# Patient Record
Sex: FEMALE | Race: WHITE | NOT HISPANIC OR LATINO | Employment: FULL TIME | URBAN - METROPOLITAN AREA
[De-identification: names, ages, dates, MRNs, and addresses within clinical notes are randomized per-mention and may not be internally consistent; named-entity substitution may affect disease eponyms.]

---

## 2017-02-03 ENCOUNTER — TELEPHONE (OUTPATIENT)
Dept: FAMILY MEDICINE | Facility: OTHER | Age: 57
End: 2017-02-03

## 2017-02-03 DIAGNOSIS — R53.83 FATIGUE, UNSPECIFIED TYPE: Primary | ICD-10-CM

## 2017-02-03 DIAGNOSIS — R53.83 FATIGUE, UNSPECIFIED TYPE: ICD-10-CM

## 2017-02-03 LAB
BASOPHILS # BLD AUTO: 0 10E9/L (ref 0–0.2)
BASOPHILS NFR BLD AUTO: 0.6 %
DIFFERENTIAL METHOD BLD: NORMAL
EOSINOPHIL # BLD AUTO: 0.3 10E9/L (ref 0–0.7)
EOSINOPHIL NFR BLD AUTO: 4.1 %
ERYTHROCYTE [DISTWIDTH] IN BLOOD BY AUTOMATED COUNT: 14 % (ref 10–15)
HCT VFR BLD AUTO: 39.1 % (ref 35–47)
HGB BLD-MCNC: 12.7 G/DL (ref 11.7–15.7)
IRON SATN MFR SERPL: 6 % (ref 15–46)
IRON SERPL-MCNC: 28 UG/DL (ref 35–180)
LYMPHOCYTES # BLD AUTO: 2.3 10E9/L (ref 0.8–5.3)
LYMPHOCYTES NFR BLD AUTO: 32.9 %
MCH RBC QN AUTO: 27.5 PG (ref 26.5–33)
MCHC RBC AUTO-ENTMCNC: 32.5 G/DL (ref 31.5–36.5)
MCV RBC AUTO: 85 FL (ref 78–100)
MONOCYTES # BLD AUTO: 0.6 10E9/L (ref 0–1.3)
MONOCYTES NFR BLD AUTO: 8.2 %
NEUTROPHILS # BLD AUTO: 3.9 10E9/L (ref 1.6–8.3)
NEUTROPHILS NFR BLD AUTO: 54.2 %
PLATELET # BLD AUTO: 324 10E9/L (ref 150–450)
RBC # BLD AUTO: 4.62 10E12/L (ref 3.8–5.2)
TIBC SERPL-MCNC: 467 UG/DL (ref 240–430)
TSH SERPL DL<=0.005 MIU/L-ACNC: 2.31 MU/L (ref 0.4–4)
WBC # BLD AUTO: 7.1 10E9/L (ref 4–11)

## 2017-02-03 PROCEDURE — 85025 COMPLETE CBC W/AUTO DIFF WBC: CPT | Performed by: FAMILY MEDICINE

## 2017-02-03 PROCEDURE — 36415 COLL VENOUS BLD VENIPUNCTURE: CPT | Performed by: FAMILY MEDICINE

## 2017-02-03 PROCEDURE — 84443 ASSAY THYROID STIM HORMONE: CPT | Performed by: FAMILY MEDICINE

## 2017-02-03 PROCEDURE — 82306 VITAMIN D 25 HYDROXY: CPT | Performed by: FAMILY MEDICINE

## 2017-02-03 PROCEDURE — 83550 IRON BINDING TEST: CPT | Performed by: FAMILY MEDICINE

## 2017-02-03 PROCEDURE — 82607 VITAMIN B-12: CPT | Performed by: FAMILY MEDICINE

## 2017-02-03 PROCEDURE — 83540 ASSAY OF IRON: CPT | Performed by: FAMILY MEDICINE

## 2017-02-03 NOTE — TELEPHONE ENCOUNTER
Pt is calling back and wants to know what she should do. She would like to get labs done no matter what. She is constantly tired and she feels her levels are low. Please advise.

## 2017-02-03 NOTE — TELEPHONE ENCOUNTER
[facility] phone call message- patient request for an order or referral:    Order or referral being requested: orders  Reason for request: lab  Date needed: as soon as possible  Has the patient been seen by the PCP for this problem? NO    Additional comments: Patient states she is tired all the time and would like some lab work done.    OK to leave the result message on voice mail or with a family member? YES    Call taken on 2/3/2017 at 7:19 AM by Carolynn Chao

## 2017-02-03 NOTE — TELEPHONE ENCOUNTER
I spoke with patient and informed her of the message below.  She is now scheduled for today 2/3/17 to get her labs done.  No further questions at this time.  Tegan Hernández, CMA

## 2017-02-04 LAB — VIT B12 SERPL-MCNC: 639 PG/ML (ref 193–986)

## 2017-02-05 LAB — DEPRECATED CALCIDIOL+CALCIFEROL SERPL-MC: 42 UG/L (ref 20–75)

## 2017-02-07 NOTE — PROGRESS NOTES
Quick Note:    Your recent blood tests show that your all your tests are normal except for your iron level which is slightly low . You take over the counter iron supplements  ______

## 2017-02-13 NOTE — PROGRESS NOTES
SUBJECTIVE:                                                    Sun De La Garza is a 56 year old female who presents to clinic today for the following health issues:    Acute Illness   Acute illness concerns: sore throat, cough , seen at the urgent care strept test negative , diagnosed with Viral uri , now voice is hoarse, also worried about Mono  Onset: x 1 weeks    Fever: no    Chills/Sweats: YES- both    Headache (location?): no    Sinus Pressure:no    Conjunctivitis:  no    Ear Pain: no    Rhinorrhea: no    Congestion: no    Sore Throat: no     Cough: YES - phlegm, hoarse voice    Wheeze: no    Decreased Appetite: no    Nausea: yes    Vomiting: no     Diarrhea:  no    Dysuria/Freq.: no    Fatigue/Achiness: YES- tired    Sick/Strep Exposure: YES- co workers     Therapies Tried and outcome: nothing      Patient states she also needs to do a biometric screening. Patient has form with her.    Patient with depression and anxiety, on sertraline and tolerated medication well, will like to get refill today     GERD: stable, Omeprazole, helping , requesting refill  IBS doing well with Bentyl and probiotics      Problem list and histories reviewed & adjusted, as indicated.  Additional history: as documented    Patient Active Problem List   Diagnosis     CARDIOVASCULAR SCREENING; LDL GOAL LESS THAN 160     Chronic abdominal pain     Skin tag     Sebaceous cyst     Vitamin D deficiency     Depression with anxiety     IBS (irritable bowel syndrome)     Diverticulosis of large intestine     Past Surgical History   Procedure Laterality Date     Gyn surgery       ablation     Colonoscopy N/A 9/22/2014     Procedure: COLONOSCOPY;  Surgeon: Mj Morales MD;  Location:  GI     Esophagoscopy, gastroscopy, duodenoscopy (egd), combined N/A 9/22/2014     Procedure: COMBINED ESOPHAGOSCOPY, GASTROSCOPY, DUODENOSCOPY (EGD), BIOPSY SINGLE OR MULTIPLE;  Surgeon: Mj Morales MD;  Location:  GI       Social History  "  Substance Use Topics     Smoking status: Former Smoker     Quit date: 3/4/2012     Smokeless tobacco: Never Used     Alcohol use No      Comment: socailly     Family History   Problem Relation Age of Onset     Other Cancer Father      Melanoma Father      Hypertension Father      Hyperlipidemia Mother      Hypertension Brother      Hypertension Brother      Anesthesia Reaction No family hx of          Current Outpatient Prescriptions   Medication Sig Dispense Refill     sertraline (ZOLOFT) 50 MG tablet Take 2 tablets (100 mg) by mouth daily 180 tablet 1     omeprazole (PRILOSEC) 40 MG capsule Take 1 capsule (40 mg) by mouth daily Take 30-60 minutes before a meal. 90 capsule 2     dicyclomine (BENTYL) 10 MG capsule Take 1 capsule (10 mg) by mouth 2 times daily (before meals) 240 capsule 1     saccharomyces boulardii (FLORASTOR) 250 MG capsule Take 250 mg by mouth 2 times daily       aspirin (BABY ASPIRIN) 81 MG chewable tablet Take 81 mg by mouth daily.       dicyclomine (BENTYL) 10 MG capsule TAKE 1 CAPSULE TWICE A DAY BEFORE MEALS (Patient not taking: Reported on 2/15/2017) 240 capsule 0     No Known Allergies  BP Readings from Last 3 Encounters:   02/15/17 118/66   10/26/16 124/80   07/05/16 110/65    Wt Readings from Last 3 Encounters:   02/15/17 174 lb 1.6 oz (79 kg)   10/26/16 172 lb 11.2 oz (78.3 kg)   05/17/16 162 lb (73.5 kg)                  Labs reviewed in EPIC  Problem list, Medication list, Allergies, and Medical/Social/Surgical histories reviewed in [facility] and updated as appropriate.    ROS:  C: NEGATIVE for fever, chills, change in weight  ENT/MOUTH: POSITIVE for Hoarse voice   R: NEGATIVE for significant cough or SOB  CV: NEGATIVE for chest pain, palpitations or peripheral edema    OBJECTIVE:                                                    /66 (BP Location: Right arm, Patient Position: Chair, Cuff Size: Adult Regular)  Pulse 72  Temp 96.6  F (35.9  C) (Temporal)  Resp 16  Ht 5' 3.6\" (1.615 " m)  Wt 174 lb 1.6 oz (79 kg)  SpO2 95%  Breastfeeding? No  BMI 30.26 kg/m2  Body mass index is 30.26 kg/(m^2).   GENERAL: healthy, alert, well nourished, well hydrated, no distress  HENT: ear canals- normal; TMs- normal; Nose- normal; Mouth- no ulcers, no lesions  NECK: no tenderness, no adenopathy, no asymmetry, no masses, no stiffness; thyroid- normal to palpation  RESP: lungs clear to auscultation - no rales, no rhonchi, no wheezes  CV: regular rates and rhythm, normal S1 S2, no S3 or S4 and no murmur, no click or rub -  ABDOMEN: soft, no tenderness, no  hepatosplenomegaly, no masses, normal bowel sounds  PSYCH: Alert and oriented times 3; speech- coherent , normal rate and volume; able to articulate logical thoughts, able to abstract reason, no tangential thoughts, no hallucinations or delusions, affect- normal  MENTAL STATUS EXAM:  Appearance/Behavior: No apparent distress and Casually groomed  Speech: Normal  Mood/Affect: normal affect  Insight: Adequate    Diagnostic test results:  Diagnostic Test Results:  Results for orders placed or performed in visit on 02/15/17 (from the past 24 hour(s))   Mononucleosis screen   Result Value Ref Range    Mononucleosis Screen Negative NEG        ASSESSMENT/PLAN:                                                    1. Encounter for biometric screening    - Lipid panel reflex to direct LDL  - Glucose    2. Depression with anxiety  Stable   - DEPRESSION ACTION PLAN (DAP) Order [[id number]]  - sertraline (ZOLOFT) 50 MG tablet; Take 2 tablets (100 mg) by mouth daily  Dispense: 180 tablet; Refill: 1    3. Gastroesophageal reflux disease with esophagitis  Stable   - omeprazole (PRILOSEC) 40 MG capsule; Take 1 capsule (40 mg) by mouth daily Take 30-60 minutes before a meal.  Dispense: 90 capsule; Refill: 2    4. Irritable bowel syndrome with both constipation and diarrhea  Stable   - dicyclomine (BENTYL) 10 MG capsule; Take 1 capsule (10 mg) by mouth 2 times daily (before meals)   Dispense: 240 capsule; Refill: 1    5. Chronic fatigue  Negative   - Mononucleosis screen    6. Laryngitis    Rest your voice until it recovers. Talk as little as possible. If your symptoms are severe, rest at home for a day or so.    Breathing cool steam from a humidifier/vaporizer or in a steamy shower may be helpful.    Drink plenty of fluids to stay well hydrated.    Do not smoke        Follow up with Provider - sherine Nash MD, MD  [facility]

## 2017-02-15 ENCOUNTER — OFFICE VISIT (OUTPATIENT)
Dept: FAMILY MEDICINE | Facility: OTHER | Age: 57
End: 2017-02-15
Payer: COMMERCIAL

## 2017-02-15 VITALS
WEIGHT: 174.1 LBS | SYSTOLIC BLOOD PRESSURE: 118 MMHG | RESPIRATION RATE: 16 BRPM | BODY MASS INDEX: 29.72 KG/M2 | OXYGEN SATURATION: 95 % | HEART RATE: 72 BPM | TEMPERATURE: 96.6 F | HEIGHT: 64 IN | DIASTOLIC BLOOD PRESSURE: 66 MMHG

## 2017-02-15 DIAGNOSIS — R53.82 CHRONIC FATIGUE: ICD-10-CM

## 2017-02-15 DIAGNOSIS — F41.8 DEPRESSION WITH ANXIETY: ICD-10-CM

## 2017-02-15 DIAGNOSIS — K58.2 IRRITABLE BOWEL SYNDROME WITH BOTH CONSTIPATION AND DIARRHEA: ICD-10-CM

## 2017-02-15 DIAGNOSIS — Z00.8 ENCOUNTER FOR BIOMETRIC SCREENING: Primary | ICD-10-CM

## 2017-02-15 DIAGNOSIS — J04.0 LARYNGITIS: ICD-10-CM

## 2017-02-15 DIAGNOSIS — K21.00 GASTROESOPHAGEAL REFLUX DISEASE WITH ESOPHAGITIS: ICD-10-CM

## 2017-02-15 LAB — HETEROPH AB SER QL: NEGATIVE

## 2017-02-15 PROCEDURE — 36415 COLL VENOUS BLD VENIPUNCTURE: CPT | Performed by: FAMILY MEDICINE

## 2017-02-15 PROCEDURE — 86308 HETEROPHILE ANTIBODY SCREEN: CPT | Performed by: FAMILY MEDICINE

## 2017-02-15 PROCEDURE — 82947 ASSAY GLUCOSE BLOOD QUANT: CPT | Performed by: FAMILY MEDICINE

## 2017-02-15 PROCEDURE — 80061 LIPID PANEL: CPT | Performed by: FAMILY MEDICINE

## 2017-02-15 PROCEDURE — 99214 OFFICE O/P EST MOD 30 MIN: CPT | Performed by: FAMILY MEDICINE

## 2017-02-15 RX ORDER — DICYCLOMINE HYDROCHLORIDE 10 MG/1
10 CAPSULE ORAL
Qty: 240 CAPSULE | Refills: 1 | Status: SHIPPED | OUTPATIENT
Start: 2017-02-15 | End: 2017-05-02

## 2017-02-15 RX ORDER — OMEPRAZOLE 40 MG/1
40 CAPSULE, DELAYED RELEASE ORAL DAILY
Qty: 90 CAPSULE | Refills: 2 | Status: SHIPPED | OUTPATIENT
Start: 2017-02-15 | End: 2017-11-14

## 2017-02-15 ASSESSMENT — PAIN SCALES - GENERAL: PAINLEVEL: NO PAIN (0)

## 2017-02-15 NOTE — PATIENT INSTRUCTIONS
Laryngitis    Laryngitis is a swelling of the tissues around the vocal cords. Symptoms include a hoarse (scratchy) voice. The voice may be lost completely. It may be caused by a viral illness, such as a head or chest cold. It may also be due to overuse and strain of the voice. Smoking, drinking alcohol, acid reflux, allergies, or inhaling harsh chemicals may also lead to symptoms. This condition will usually resolve in 1-2 weeks.  Home care    Rest your voice until it recovers. Talk as little as possible. If your symptoms are severe, rest at home for a day or so.    Breathing cool steam from a humidifier/vaporizer or in a steamy shower may be helpful.    Drink plenty of fluids to stay well hydrated.    Do not smoke  Follow-up care  Follow up with your healthcare provider or this facility if you have not improved after one week.  When to seek medical advice  Contact your healthcare provider for any of the following:    Severe pain with swallowing    Trouble opening mouth    Neck swelling, neck pain, or trouble moving neck    Noisy breathing or trouble breathing    Fever of 100.4 F (38. C) or higher, or as directed by your healthcare provider    Drooling    Symptoms do not resolve in 2 weeks    1028-1451 [employer] [address] [place] [address], [place] [address]. All rights reserved. This information is not intended as a substitute for professional medical care. Always follow your healthcare professional's instructions.

## 2017-02-15 NOTE — MR AVS SNAPSHOT
After Visit Summary   2/15/2017    Sun De La Garza    MRN: [id number]           Patient Information     Date Of Birth          1960        Visit Information        Provider Department      2/15/2017 3:00 PM Zuleika Nash MD [facility]        Today's Diagnoses     Encounter for biometric screening    -  1    Depression with anxiety        Gastroesophageal reflux disease with esophagitis        Irritable bowel syndrome with both constipation and diarrhea        Chronic fatigue        Laryngitis          Care Instructions      Laryngitis    Laryngitis is a swelling of the tissues around the vocal cords. Symptoms include a hoarse (scratchy) voice. The voice may be lost completely. It may be caused by a viral illness, such as a head or chest cold. It may also be due to overuse and strain of the voice. Smoking, drinking alcohol, acid reflux, allergies, or inhaling harsh chemicals may also lead to symptoms. This condition will usually resolve in 1-2 weeks.  Home care    Rest your voice until it recovers. Talk as little as possible. If your symptoms are severe, rest at home for a day or so.    Breathing cool steam from a humidifier/vaporizer or in a steamy shower may be helpful.    Drink plenty of fluids to stay well hydrated.    Do not smoke  Follow-up care  Follow up with your healthcare provider or this facility if you have not improved after one week.  When to seek medical advice  Contact your healthcare provider for any of the following:    Severe pain with swallowing    Trouble opening mouth    Neck swelling, neck pain, or trouble moving neck    Noisy breathing or trouble breathing    Fever of 100.4 F (38. C) or higher, or as directed by your healthcare provider    Drooling    Symptoms do not resolve in 2 weeks    9921-4006 [employer]. [address] [place] [address], [place] [address]. All rights reserved. This information is not intended as a substitute for  "professional medical care. Always follow your healthcare professional's instructions.              Follow-ups after your visit        Who to contact     If you have questions or need follow up information about today's clinic visit or your schedule please contact [facility] [place] directly at [phone].  Normal or non-critical lab and imaging results will be communicated to you by MyChart, letter or phone within 4 business days after the clinic has received the results. If you do not hear from us within 7 days, please contact the clinic through [employer] or phone. If you have a critical or abnormal lab result, we will notify you by phone as soon as possible.  Submit refill requests through [employer] or call your pharmacy and they will forward the refill request to us. Please allow 3 business days for your refill to be completed.          Additional Information About Your Visit        [employer] Information     [employer] gives you secure access to your electronic health record. If you see a primary care provider, you can also send messages to your care team and make appointments. If you have questions, please call your primary care clinic.  If you do not have a primary care provider, please call [phone] and they will assist you.        Care EveryWhere ID     This is your Care EveryWhere ID. This could be used by other organizations to access your [facility] medical records  [id number]        Your Vitals Were     Pulse Temperature Respirations Height Pulse Oximetry Breastfeeding?    72 96.6  F (35.9  C) (Temporal) 16 5' 3.6\" (1.615 m) 95% No    BMI (Body Mass Index)                   30.26 kg/m2            Blood Pressure from Last 3 Encounters:   02/15/17 118/66   10/26/16 124/80   07/05/16 110/65    Weight from Last 3 Encounters:   02/15/17 174 lb 1.6 oz (79 kg)   10/26/16 172 lb 11.2 oz (78.3 kg)   05/17/16 162 lb (73.5 kg)              We Performed the Following     DEPRESSION ACTION PLAN (DAP) Order " [[id number]]     DEPRESSION ACTION PLAN (DAP)     Glucose     Lipid panel reflex to direct LDL     Mononucleosis screen          Today's Medication Changes          These changes are accurate as of: 2/15/17  3:50 PM.  If you have any questions, ask your nurse or doctor.               These medicines have changed or have updated prescriptions.        Dose/Directions    sertraline 50 MG tablet   Commonly known as:  ZOLOFT   This may have changed:  See the new instructions.   Used for:  Depression with anxiety   Changed by:  Zuleika Nash MD        Dose:  100 mg   Take 2 tablets (100 mg) by mouth daily   Quantity:  180 tablet   Refills:  1            Where to get your medicines      These medications were sent to [facility] - [address] [address]     Phone:  [phone]     dicyclomine 10 MG capsule    omeprazole 40 MG capsule    sertraline 50 MG tablet                Primary Care Provider Office Phone # Fax #    Zuleika Nash [phone]       [facility] [address] [place] [address]        Thank you!     Thank you for choosing [facility]  for your care. Our goal is always to provide you with excellent care. Hearing back from our patients is one way we can continue to improve our services. Please take a few minutes to complete the written survey that you may receive in the mail after your visit with us. Thank you!             Your Updated Medication List - Protect others around you: Learn how to safely use, store and throw away your medicines at www.disposemymeds.org.          This list is accurate as of: 2/15/17  3:50 PM.  Always use your most recent med list.                   Brand Name Dispense Instructions for use    BABY ASPIRIN 81 MG chewable tablet   Generic drug:  aspirin      Take 81 mg by mouth daily.       * dicyclomine 10 MG capsule    BENTYL    240 capsule     TAKE 1 CAPSULE TWICE A DAY BEFORE MEALS       * dicyclomine 10 MG capsule    BENTYL    240 capsule    Take 1 capsule (10 mg) by mouth 2 times daily (before meals)       omeprazole 40 MG capsule    priLOSEC    90 capsule    Take 1 capsule (40 mg) by mouth daily Take 30-60 minutes before a meal.       saccharomyces boulardii 250 MG capsule    FLORASTOR     Take 250 mg by mouth 2 times daily       sertraline 50 MG tablet    ZOLOFT    180 tablet    Take 2 tablets (100 mg) by mouth daily       * Notice:  This list has 2 medication(s) that are the same as other medications prescribed for you. Read the directions carefully, and ask your doctor or other care provider to review them with you.

## 2017-02-15 NOTE — LETTER
"[facility]  [address] [facility] [address]  Phone: [phone]    February 17, 2017    Sun [address] Fred[place]  [address] [address] [address]          Dear Ms. De La Garza,    I am writing to inform you of the results of the laboratory tests you had done recently.     Results for orders placed or performed in visit on 02/15/17   Lipid panel reflex to direct LDL   Result Value Ref Range    Cholesterol 204 (H) <200 mg/dL    Triglycerides 180 (H) <150 mg/dL    HDL Cholesterol 54 >49 mg/dL    LDL Cholesterol Calculated 114 (H) <100 mg/dL    Non HDL Cholesterol 150 (H) <130 mg/dL   Glucose   Result Value Ref Range    Glucose 88 70 - 99 mg/dL   Mononucleosis screen   Result Value Ref Range    Mononucleosis Screen Negative NEG           HDL is the \"good\" cholesterol and when it is high (over 60), it decreases the risk for heart attack and stroke. When the HDL is less than 40, it increases the risk for these problems. The HDL can be raised by regular exercise and sometimes medications, such as niacin.    LDL is the \"bad\" cholesterol linked to heart disease and stroke. For those who have heart disease or diabetes, their LDL should be less than 100. For most everyone else, the LDL should be less than 130. For those with no risk factors, under 160 is acceptable.    Your triglycerides should be less than 150. These can be lowered with a low fat diet, reducing alcohol use, losing weight and, for those with diabetes, by improving blood sugar control.    Slight variations and daily fluctuations are normal and should cause little concern. As you know, an elevated cholesterol is one factor in increasing your risk of heart disease or stroke. You can improve your cholesterol by controlling the amount and type of fat you eat and by increasing your daily activity level.    Based on these results .    Please follow these recommendations:  Please start or continue with a low fat diet ( low cholesterol)  Begin or " "continue a regular aerobic exercise program.  Increase frequency of exercise. ( This is the BEST way to increase \"good\" / HDL portion of cholesterol)  No further recommendations at this time.    It is my pleasure to help partake in your healthcare needs. Please feel free to call the clinic if you have any further questions or problems.    Sincerely,      Zuleika Nash MD        "

## 2017-02-15 NOTE — NURSING NOTE
"Chief Complaint   Patient presents with     Pharyngitis     patient states she is also here for a bipometric screening     Cough     Panel Management     flushot, ldl, dap, urine drug screen, csa       Initial /66 (BP Location: Right arm, Patient Position: Chair, Cuff Size: Adult Regular)  Pulse 72  Temp 96.6  F (35.9  C) (Temporal)  Resp 16  Ht 5' 3\" (1.6 m)  Wt 183 lb 12.8 oz (83.4 kg)  SpO2 95%  Breastfeeding? No  BMI 32.56 kg/m2 Estimated body mass index is 32.56 kg/(m^2) as calculated from the following:    Height as of this encounter: 5' 3\" (1.6 m).    Weight as of this encounter: 183 lb 12.8 oz (83.4 kg).  Medication Reconciliation: complete    "

## 2017-02-15 NOTE — LETTER
My Depression Action Plan  Name: Sun De La Garza   Date of Birth 1960  Date: 2/13/2017    My doctor: Zuleika Nash   My clinic: [address] [address] [phone]          GREEN    ZONE   Good Control    What it looks like:     Things are going generally well. You have normal up s and down s. You may even feel depressed from time to time, but bad moods usually last less than a day.   What you need to do:  1. Continue to care for yourself (see self care plan)  2. Check your depression survival kit and update it as needed  3. Follow your physician s recommendations including any medication.  4. Do not stop taking medication unless you consult with your physician first.           YELLOW         ZONE Getting Worse    What it looks like:     Depression is starting to interfere with your life.     It may be hard to get out of bed; you may be starting to isolate yourself from others.    Symptoms of depression are starting to last most all day and this has happened for several days.     You may have suicidal thoughts but they are not constant.   What you need to do:     1. Call your care team, your response to treatment will improve if you keep your care team informed of your progress. Yellow periods are signs an adjustment may need to be made.     2. Continue your self-care, even if you have to fake it!    3. Talk to someone in your support network    4. Open up your depression survival kit           RED    ZONE Medical Alert - Get Help    What it looks like:     Depression is seriously interfering with your life.     You may experience these or other symptoms: You can t get out of bed most days, can t work or engage in other necessary activities, you have trouble taking care of basic hygiene, or basic responsibilities, thoughts of suicide or death that will not go away, self-injurious behavior.     What you need to do:  1. Call your care  team and request a same-day appointment. If they are not available (weekends or after hours) call your local crisis line, emergency room or 911.      Electronically signed by: Obdulia Jackson, February 13, 2017    Depression Self Care Plan / Survival Kit    Self-Care for Depression  Here s the deal. Your body and mind are really not as separate as most people think.  What you do and think affects how you feel and how you feel influences what you do and think. This means if you do things that people who feel good do, it will help you feel better.  Sometimes this is all it takes.  There is also a place for medication and therapy depending on how severe your depression is, so be sure to consult with your medical provider and/ or Behavioral Health Consultant if your symptoms are worsening or not improving.     In order to better manage my stress, I will:    Exercise  Get some form of exercise, every day. This will help reduce pain and release endorphins, the  feel good  chemicals in your brain. This is almost as good as taking antidepressants!  This is not the same as joining a gym and then never going! (they count on that by the way ) It can be as simple as just going for a walk or doing some gardening, anything that will get you moving.      Hygiene   Maintain good hygiene (Get out of bed in the morning, Make your bed, Brush your teeth, Take a shower, and Get dressed like you were going to work, even if you are unemployed).  If your clothes don't fit try to get ones that do.    Diet  I will strive to eat foods that are good for me, drink plenty of water, and avoid excessive sugar, caffeine, alcohol, and other mood-altering substances.  Some foods that are helpful in depression are: complex carbohydrates, B vitamins, flaxseed, fish or fish oil, fresh fruits and vegetables.    Psychotherapy  I agree to participate in Individual Therapy (if recommended).    Medication  If prescribed medications, I agree to take them.   Missing doses can result in serious side effects.  I understand that drinking alcohol, or other illicit drug use, may cause potential side effects.  I will not stop my medication abruptly without first discussing it with my provider.    Staying Connected With Others  I will stay in touch with my friends, family members, and my primary care provider/team.    Use your imagination  Be creative.  We all have a creative side; it doesn t matter if it s oil painting, sand castles, or mud pies! This will also kick up the endorphins.    Witness Beauty  (AKA stop and smell the roses) Take a look outside, even in mid-winter. Notice colors, textures. Watch the squirrels and birds.     Service to others  Be of service to others.  There is always someone else in need.  By helping others we can  get out of ourselves  and remember the really important things.  This also provides opportunities for practicing all the other parts of the program.    Humor  Laugh and be silly!  Adjust your TV habits for less news and crime-drama and more comedy.    Control your stress  Try breathing deep, massage therapy, biofeedback, and meditation. Find time to relax each day.     My support system    Clinic Contact:  Phone number:    Contact 1:  Phone number:    Contact 2:  Phone number:    S[employer]ian[employer]/:  Phone number:    Therapist:  Phone number:    Local crisis center:    Phone number:    Other community support:  Phone number:

## 2017-02-16 LAB
CHOLEST SERPL-MCNC: 204 MG/DL
GLUCOSE SERPL-MCNC: 88 MG/DL (ref 70–99)
HDLC SERPL-MCNC: 54 MG/DL
LDLC SERPL CALC-MCNC: 114 MG/DL
NONHDLC SERPL-MCNC: 150 MG/DL
TRIGL SERPL-MCNC: 180 MG/DL

## 2017-02-17 ENCOUNTER — TELEPHONE (OUTPATIENT)
Dept: FAMILY MEDICINE | Facility: OTHER | Age: 57
End: 2017-02-17

## 2017-02-17 NOTE — PROGRESS NOTES
"[facility]  [address] [facility] [address]  Phone: [phone]    February 17, 2017    Sun B De La Garza  [address] [facility] [address]          Dear Ms. De La Garza,    I am writing to inform you of the results of the laboratory tests you had done recently.     Results for orders placed or performed in visit on 02/15/17  Lipid panel reflex to direct LDL  Result Value Ref Range   Cholesterol 204 (H) <200 mg/dL   Triglycerides 180 (H) <150 mg/dL   HDL Cholesterol 54 >49 mg/dL   LDL Cholesterol Calculated 114 (H) <100 mg/dL   Non HDL Cholesterol 150 (H) <130 mg/dL  Glucose  Result Value Ref Range   Glucose 88 70 - 99 mg/dL  Mononucleosis screen  Result Value Ref Range   Mononucleosis Screen Negative NEG        HDL is the \"good\" cholesterol and when it is high (over 60), it decreases the risk for heart attack and stroke. When the HDL is less than 40, it increases the risk for these problems. The HDL can be raised by regular exercise and sometimes medications, such as niacin.    LDL is the \"bad\" cholesterol linked to heart disease and stroke. For those who have heart disease or diabetes, their LDL should be less than 100. For most everyone else, the LDL should be less than 130. For those with no risk factors, under 160 is acceptable.    Your triglycerides should be less than 150. These can be lowered with a low fat diet, reducing alcohol use, losing weight and, for those with diabetes, by improving blood sugar control.    Slight variations and daily fluctuations are normal and should cause little concern. As you know, an elevated cholesterol is one factor in increasing your risk of heart disease or stroke. You can improve your cholesterol by controlling the amount and type of fat you eat and by increasing your daily activity level.    Based on these results .    Please follow these recommendations:  Please start or continue with a low fat diet ( low cholesterol)  Begin or continue a " "regular aerobic exercise program.  Increase frequency of exercise. ( This is the BEST way to increase \"good\" / HDL portion of cholesterol)  No further recommendations at this time.    It is my pleasure to help partake in your healthcare needs. Please feel free to call the clinic if you have any further questions or problems.    Sincerely,      Zuleika Nash MD      "

## 2017-02-17 NOTE — TELEPHONE ENCOUNTER
Informed patient form completed and placed upfront for patient  at [facility]. Copy made and sent to scanning.    Obdulia Jackson MA

## 2017-05-02 ENCOUNTER — RADIANT APPOINTMENT (OUTPATIENT)
Dept: GENERAL RADIOLOGY | Facility: OTHER | Age: 57
End: 2017-05-02
Attending: PHYSICIAN ASSISTANT
Payer: COMMERCIAL

## 2017-05-02 ENCOUNTER — OFFICE VISIT (OUTPATIENT)
Dept: FAMILY MEDICINE | Facility: OTHER | Age: 57
End: 2017-05-02
Payer: COMMERCIAL

## 2017-05-02 VITALS
OXYGEN SATURATION: 97 % | DIASTOLIC BLOOD PRESSURE: 78 MMHG | BODY MASS INDEX: 30.89 KG/M2 | WEIGHT: 177.7 LBS | HEART RATE: 64 BPM | SYSTOLIC BLOOD PRESSURE: 120 MMHG | TEMPERATURE: 99 F

## 2017-05-02 DIAGNOSIS — R05.9 COUGH: ICD-10-CM

## 2017-05-02 DIAGNOSIS — J20.9 ACUTE BRONCHITIS WITH SYMPTOMS > 10 DAYS: Primary | ICD-10-CM

## 2017-05-02 LAB
BASOPHILS # BLD AUTO: 0 10E9/L (ref 0–0.2)
BASOPHILS NFR BLD AUTO: 0.5 %
DIFFERENTIAL METHOD BLD: NORMAL
EOSINOPHIL # BLD AUTO: 0.4 10E9/L (ref 0–0.7)
EOSINOPHIL NFR BLD AUTO: 5 %
ERYTHROCYTE [DISTWIDTH] IN BLOOD BY AUTOMATED COUNT: 14.6 % (ref 10–15)
HCT VFR BLD AUTO: 39.7 % (ref 35–47)
HGB BLD-MCNC: 12.7 G/DL (ref 11.7–15.7)
LYMPHOCYTES # BLD AUTO: 2.6 10E9/L (ref 0.8–5.3)
LYMPHOCYTES NFR BLD AUTO: 35.5 %
MCH RBC QN AUTO: 26.9 PG (ref 26.5–33)
MCHC RBC AUTO-ENTMCNC: 32 G/DL (ref 31.5–36.5)
MCV RBC AUTO: 84 FL (ref 78–100)
MONOCYTES # BLD AUTO: 0.6 10E9/L (ref 0–1.3)
MONOCYTES NFR BLD AUTO: 8.4 %
NEUTROPHILS # BLD AUTO: 3.7 10E9/L (ref 1.6–8.3)
NEUTROPHILS NFR BLD AUTO: 50.6 %
PLATELET # BLD AUTO: 323 10E9/L (ref 150–450)
RBC # BLD AUTO: 4.72 10E12/L (ref 3.8–5.2)
WBC # BLD AUTO: 7.4 10E9/L (ref 4–11)

## 2017-05-02 PROCEDURE — 71020 XR CHEST 2 VW: CPT

## 2017-05-02 PROCEDURE — 85025 COMPLETE CBC W/AUTO DIFF WBC: CPT | Performed by: PHYSICIAN ASSISTANT

## 2017-05-02 PROCEDURE — 99214 OFFICE O/P EST MOD 30 MIN: CPT | Performed by: PHYSICIAN ASSISTANT

## 2017-05-02 PROCEDURE — 36415 COLL VENOUS BLD VENIPUNCTURE: CPT | Performed by: PHYSICIAN ASSISTANT

## 2017-05-02 RX ORDER — METHYLPREDNISOLONE 4 MG
TABLET, DOSE PACK ORAL
Qty: 21 TABLET | Refills: 0 | Status: SHIPPED | OUTPATIENT
Start: 2017-05-02 | End: 2017-09-13

## 2017-05-02 RX ORDER — AZITHROMYCIN 250 MG/1
TABLET, FILM COATED ORAL
Qty: 6 TABLET | Refills: 0 | Status: SHIPPED | OUTPATIENT
Start: 2017-05-02 | End: 2017-09-13

## 2017-05-02 ASSESSMENT — PAIN SCALES - GENERAL: PAINLEVEL: NO PAIN (0)

## 2017-05-02 NOTE — NURSING NOTE
"Chief Complaint   Patient presents with     Nose Problem     chest congestion     Cough       Initial /78 (BP Location: Left arm, Patient Position: Fowlers, Cuff Size: Adult Large)  Pulse 64  Temp 99  F (37.2  C) (Temporal)  Wt 177 lb 11.2 oz (80.6 kg)  SpO2 97%  Breastfeeding? No  BMI 30.89 kg/m2 Estimated body mass index is 30.89 kg/(m^2) as calculated from the following:    Height as of 2/15/17: 5' 3.6\" (1.615 m).    Weight as of this encounter: 177 lb 11.2 oz (80.6 kg).  Medication Reconciliation: complete    "

## 2017-05-02 NOTE — PROGRESS NOTES
SUBJECTIVE:                                                    Sun De La Garza is a 56 year old female who presents to clinic today for the following health issues:    Acute Illness   Acute illness concerns: cough  Onset: x off and on for two months    Fever: no    Chills/Sweats: YES    Headache (location?): no     Sinus Pressure:no    Conjunctivitis:  no    Ear Pain: no    Rhinorrhea: no    Congestion: no    Sore Throat: no     Cough: YES    Wheeze: YES    Decreased Appetite: no    Nausea: no    Vomiting: no    Diarrhea:  no    Dysuria/Freq.: no    Fatigue/Achiness: YES- both    Sick/Strep Exposure: no     Therapies Tried and outcome: nothing    Patient reports that she started having laryngitis about 2 months ago and has been seen in the urgent care twice for laryngitis and cough. She had negative strep testing in the  but has not had a chest xray. SHe reports that her cough which has just restarted in the last week has been only mildly productive of clear sputum. SHe has not had fevers or chills, she reports that her cough and chest congestion seems to be in the upper anterior chest where she has mild pain with deep breathing and coughing, she has not had wheezing. She has a friend that recently  of lung cancer so is very anxious about these persistent symptoms. She denies any palpitations or chest pain, denies any other associated symptoms.     -------------------------------------    Problem list and histories reviewed & adjusted, as indicated.  Additional history: as documented    BP Readings from Last 3 Encounters:   17 120/78   02/15/17 118/66   10/26/16 124/80    Wt Readings from Last 3 Encounters:   17 177 lb 11.2 oz (80.6 kg)   02/15/17 174 lb 1.6 oz (79 kg)   10/26/16 172 lb 11.2 oz (78.3 kg)         Reviewed and updated as needed this visit by clinical staff       Reviewed and updated as needed this visit by Provider         ROS:  Constitutional, HEENT, cardiovascular, pulmonary, gi  and gu systems are negative, except as otherwise noted.    OBJECTIVE:                                                    /78 (BP Location: Left arm, Patient Position: Fowlers, Cuff Size: Adult Large)  Pulse 64  Temp 99  F (37.2  C) (Temporal)  Wt 177 lb 11.2 oz (80.6 kg)  SpO2 97%  Breastfeeding? No  BMI 30.89 kg/m2  Body mass index is 30.89 kg/(m^2).  GENERAL: alert and no distress  EYES: Eyes grossly normal to inspection, PERRL and conjunctivae and sclerae normal  HENT: normal cephalic/atraumatic, ear canals and TM's normal, rhinorrhea clear, oropharynx clear, oral mucous membranes moist and sinuses: not tender  NECK: no adenopathy, no asymmetry, masses, or scars and thyroid normal to palpation  RESP: lungs clear to auscultation - no rales, rhonchi or wheezes  CV: regular rates and rhythm, peripheral pulses strong and no peripheral edema  MS: no gross musculoskeletal defects noted, no edema  SKIN: no suspicious lesions or rashes    Diagnostic Test Results:  Results for orders placed or performed in visit on 05/02/17 (from the past 24 hour(s))   CBC with platelets and differential   Result Value Ref Range    WBC 7.4 4.0 - 11.0 10e9/L    RBC Count 4.72 3.8 - 5.2 10e12/L    Hemoglobin 12.7 11.7 - 15.7 g/dL    Hematocrit 39.7 35.0 - 47.0 %    MCV 84 78 - 100 fl    MCH 26.9 26.5 - 33.0 pg    MCHC 32.0 31.5 - 36.5 g/dL    RDW 14.6 10.0 - 15.0 %    Platelet Count 323 150 - 450 10e9/L    Diff Method Automated Method     % Neutrophils 50.6 %    % Lymphocytes 35.5 %    % Monocytes 8.4 %    % Eosinophils 5.0 %    % Basophils 0.5 %    Absolute Neutrophil 3.7 1.6 - 8.3 10e9/L    Absolute Lymphocytes 2.6 0.8 - 5.3 10e9/L    Absolute Monocytes 0.6 0.0 - 1.3 10e9/L    Absolute Eosinophils 0.4 0.0 - 0.7 10e9/L    Absolute Basophils 0.0 0.0 - 0.2 10e9/L     CXR: pending      ASSESSMENT/PLAN:                                                        ICD-10-CM    1. Acute bronchitis with symptoms > 10 days J20.9 azithromycin  (ZITHROMAX) 250 MG tablet     methylPREDNISolone (MEDROL DOSEPAK) 4 MG tablet   2. Cough R05 XR Chest 2 Views     CBC with platelets and differential       See Patient Instructions    Becky Hickman PA-C  [facility]

## 2017-05-02 NOTE — MR AVS SNAPSHOT
After Visit Summary   5/2/2017    Sun De La Garza    MRN: [id number]           Patient Information     Date Of Birth          1960        Visit Information        Provider Department      5/2/2017 11:40 AM Becky Hickman PA-C [facility]        Today's Diagnoses     Acute bronchitis with symptoms > 10 days    -  1    Cough          Care Instructions    I will treat you with an antibiotic and a course of steroids for acute bronchitis, we will contact you with results of xray. Follow up in clinic if worsening or not improving.     Bronchitis, Antibiotic Treatment (Adult)    Bronchitis is an infection of the air passages (bronchial tubes) in your lungs. It often occurs when you have a cold. This illness is contagious during the first few days and is spread through the air by coughing and sneezing, or by direct contact (touching the sick person and then touching your own eyes, nose, or mouth).  Symptoms of bronchitis include cough with mucus (phlegm) and low-grade fever. Bronchitis usually lasts 7 to 14 days. Mild cases can be treated with simple home remedies. More severe infection is treated with an antibiotic.  Home care  Follow these guidelines when caring for yourself at home:    If your symptoms are severe, rest at home for the first 2 to 3 days. When you go back to your usual activities, don't let yourself get too tired.    Do not smoke. Also avoid being exposed to secondhand smoke.    You may use over-the-counter medicines to control fever or pain, unless another medicine was prescribed. (Note: If you have chronic liver or kidney disease or have ever had a stomach ulcer or gastrointestinal bleeding, talk with your healthcare provider before using these medicines. Also talk to your provider if you are taking medicine to prevent blood clots.) Aspirin should never be given to anyone younger than 18 years of age who is ill with a viral infection or fever. It may cause severe  liver or brain damage.    Your appetite may be poor, so a light diet is fine. Avoid dehydration by drinking 6 to 8 glasses of fluids per day (such as water, soft drinks, sports drinks, juices, tea, or soup). Extra fluids will help loosen secretions in the nose and lungs.    Over-the-counter cough, cold, and sore-throat medicines will not shorten the length of the illness, but they may be helpful to reduce symptoms. (Note: Do not use decongestants if you have high blood pressure.)    Finish all antibiotic medicine. Do this even if you are feeling better after only a few days.  Follow-up care  Follow up with your healthcare provider, or as advised. If you had an X-ray or ECG (electrocardiogram), a specialist will review it. You will be notified of any new findings that may affect your care.  Note: If you are age 65 or older, or if you have a chronic lung disease or condition that affects your immune system, or you smoke, talk to your healthcare provider about having pneumococcal vaccinations and a yearly influenza vaccination (flu shot).  When to seek medical advice  Call your healthcare provider right away if any of these occur:    Fever of 100.4 F (38 C) or higher    Coughing up increased amounts of colored sputum    Weakness, drowsiness, headache, facial pain, ear pain, or a stiff neck   Call 911, or get immediate medical care  Contact emergency services right away if any of these occur.    Coughing up blood    Worsening weakness, drowsiness, headache, or stiff neck    Trouble breathing, wheezing, or pain with breathing    9565-1391 [employer]. [address] [place] [address], [place] [address]. All rights reserved. This information is not intended as a substitute for professional medical care. Always follow your healthcare professional's instructions.              Follow-ups after your visit        Follow-up notes from your care team     Return if symptoms worsen or fail to improve.      Who to contact      "If you have questions or need follow up information about today's clinic visit or your schedule please contact [facility] directly at [phone].  Normal or non-critical lab and imaging results will be communicated to you by MyChart, letter or phone within 4 business days after the clinic has received the results. If you do not hear from us within 7 days, please contact the clinic through [employer] or phone. If you have a critical or abnormal lab result, we will notify you by phone as soon as possible.  Submit refill requests through [employer] or call your pharmacy and they will forward the refill request to us. Please allow 3 business days for your refill to be completed.          Additional Information About Your Visit        [employer] Information     [employer] lets you send messages to your doctor, view your test results, renew your prescriptions, schedule appointments and more. To sign up, go to [email or web address] . Click on \"Log in\" on the left side of the screen, which will take you to the Welcome page. Then click on \"Sign up Now\" on the right side of the page.     You will be asked to enter the access code listed below, as well as some personal information. Please follow the directions to create your username and password.     Your access code is: [email or web address]  Expires: 2017 12:28 PM     Your access code will  in 90 days. If you need help or a new code, please call your [facility] clinic or [phone].        Care EveryWhere ID     This is your Care EveryWhere ID. This could be used by other organizations to access your [facility] medical records  [id number]        Your Vitals Were     Pulse Temperature Pulse Oximetry Breastfeeding? BMI (Body Mass Index)       64 99  F (37.2  C) (Temporal) 97% No 30.89 kg/m2        Blood Pressure from Last 3 Encounters:   17 120/78   02/15/17 118/66   10/26/16 124/80    Weight from Last 3 Encounters:   17 177 lb 11.2 oz (80.6 kg) "   02/15/17 174 lb 1.6 oz (79 kg)   10/26/16 172 lb 11.2 oz (78.3 kg)              We Performed the Following     CBC with platelets and differential          Today's Medication Changes          These changes are accurate as of: 5/2/17 12:28 PM.  If you have any questions, ask your nurse or doctor.               Start taking these medicines.        Dose/Directions    azithromycin 250 MG tablet   Commonly known as:  ZITHROMAX   Used for:  Acute bronchitis with symptoms > 10 days   Started by:  Becky Hickman PA-C        Two tablets first day, then one tablet daily for four days.   Quantity:  6 tablet   Refills:  0       methylPREDNISolone 4 MG tablet   Commonly known as:  MEDROL DOSEPAK   Used for:  Acute bronchitis with symptoms > 10 days   Started by:  Becky Hickman PA-C        Follow package instructions   Quantity:  21 tablet   Refills:  0            Where to get your medicines      These medications were sent to [facility] [place] - [address]   [address] [place] [address]     Phone:  [phone]     azithromycin 250 MG tablet    methylPREDNISolone 4 MG tablet                Primary Care Provider Office Phone # Fax #    Zuleika Deepthi Nash [phone]       [facility] [address] [place] [address]        Thank you!     Thank you for choosing [facility]  for your care. Our goal is always to provide you with excellent care. Hearing back from our patients is one way we can continue to improve our services. Please take a few minutes to complete the written survey that you may receive in the mail after your visit with us. Thank you!             Your Updated Medication List - Protect others around you: Learn how to safely use, store and throw away your medicines at www.disposemymeds.org.          This list is accurate as of: 5/2/17 12:28 PM.  Always use your most recent med list.                   Brand Name  Dispense Instructions for use    azithromycin 250 MG tablet    ZITHROMAX    6 tablet    Two tablets first day, then one tablet daily for four days.       BABY ASPIRIN 81 MG chewable tablet   Generic drug:  aspirin      Take 81 mg by mouth daily.       dicyclomine 10 MG capsule    BENTYL    240 capsule    TAKE 1 CAPSULE TWICE A DAY BEFORE MEALS       methylPREDNISolone 4 MG tablet    MEDROL DOSEPAK    21 tablet    Follow package instructions       omeprazole 40 MG capsule    priLOSEC    90 capsule    Take 1 capsule (40 mg) by mouth daily Take 30-60 minutes before a meal.       saccharomyces boulardii 250 MG capsule    FLORASTOR     Take 250 mg by mouth 2 times daily Reported on 5/2/2017       sertraline 50 MG tablet    ZOLOFT    180 tablet    Take 2 tablets (100 mg) by mouth daily

## 2017-05-02 NOTE — PATIENT INSTRUCTIONS
I will treat you with an antibiotic and a course of steroids for acute bronchitis, we will contact you with results of xray. Follow up in clinic if worsening or not improving.     Bronchitis, Antibiotic Treatment (Adult)    Bronchitis is an infection of the air passages (bronchial tubes) in your lungs. It often occurs when you have a cold. This illness is contagious during the first few days and is spread through the air by coughing and sneezing, or by direct contact (touching the sick person and then touching your own eyes, nose, or mouth).  Symptoms of bronchitis include cough with mucus (phlegm) and low-grade fever. Bronchitis usually lasts 7 to 14 days. Mild cases can be treated with simple home remedies. More severe infection is treated with an antibiotic.  Home care  Follow these guidelines when caring for yourself at home:    If your symptoms are severe, rest at home for the first 2 to 3 days. When you go back to your usual activities, don't let yourself get too tired.    Do not smoke. Also avoid being exposed to secondhand smoke.    You may use over-the-counter medicines to control fever or pain, unless another medicine was prescribed. (Note: If you have chronic liver or kidney disease or have ever had a stomach ulcer or gastrointestinal bleeding, talk with your healthcare provider before using these medicines. Also talk to your provider if you are taking medicine to prevent blood clots.) Aspirin should never be given to anyone younger than 18 years of age who is ill with a viral infection or fever. It may cause severe liver or brain damage.    Your appetite may be poor, so a light diet is fine. Avoid dehydration by drinking 6 to 8 glasses of fluids per day (such as water, soft drinks, sports drinks, juices, tea, or soup). Extra fluids will help loosen secretions in the nose and lungs.    Over-the-counter cough, cold, and sore-throat medicines will not shorten the length of the illness, but they may be  helpful to reduce symptoms. (Note: Do not use decongestants if you have high blood pressure.)    Finish all antibiotic medicine. Do this even if you are feeling better after only a few days.  Follow-up care  Follow up with your healthcare provider, or as advised. If you had an X-ray or ECG (electrocardiogram), a specialist will review it. You will be notified of any new findings that may affect your care.  Note: If you are age 65 or older, or if you have a chronic lung disease or condition that affects your immune system, or you smoke, talk to your healthcare provider about having pneumococcal vaccinations and a yearly influenza vaccination (flu shot).  When to seek medical advice  Call your healthcare provider right away if any of these occur:    Fever of 100.4 F (38 C) or higher    Coughing up increased amounts of colored sputum    Weakness, drowsiness, headache, facial pain, ear pain, or a stiff neck   Call 911, or get immediate medical care  Contact emergency services right away if any of these occur.    Coughing up blood    Worsening weakness, drowsiness, headache, or stiff neck    Trouble breathing, wheezing, or pain with breathing    5436-8804 [employer]. [address] [place] [address], [place] [address]. All rights reserved. This information is not intended as a substitute for professional medical care. Always follow your healthcare professional's instructions.

## 2017-05-03 ASSESSMENT — PATIENT HEALTH QUESTIONNAIRE - PHQ9: SUM OF ALL RESPONSES TO PHQ QUESTIONS 1-9: 11

## 2017-05-04 ENCOUNTER — TELEPHONE (OUTPATIENT)
Dept: FAMILY MEDICINE | Facility: OTHER | Age: 57
End: 2017-05-04

## 2017-05-04 NOTE — TELEPHONE ENCOUNTER
LMTC please advise of message below, I have mailed out a copy of her results as well, thanks    Notes Recorded by Becky Hickman PA-C on 5/4/2017 at 8:17 AM  Chest xray shows no abnormalities.    Ivory Garcia RT (R)

## 2017-05-10 ENCOUNTER — TELEPHONE (OUTPATIENT)
Dept: FAMILY MEDICINE | Facility: OTHER | Age: 57
End: 2017-05-10

## 2017-05-10 NOTE — TELEPHONE ENCOUNTER
Reason for Call:  Form, our goal is to have forms completed with 72 hours, however, some forms may require a visit or additional information.    Type of letter, form or note:  FMLA    Who is the form from?: Patient    Where did the form come from: Patient or family brought in       What clinic location was the form placed at?: [facility] [[email or web address]] - [phone]    Where the form was placed: [facility]    What number is listed as a contact on the form?: [phone]       Additional comments: Please fax form to # on form and then also mail a copy to pt's home address.     Call taken on 5/10/2017 at 1:20 PM by Lily Romo

## 2017-05-22 ENCOUNTER — HOSPITAL ENCOUNTER (OUTPATIENT)
Dept: CT IMAGING | Facility: CLINIC | Age: 57
End: 2017-05-22
Attending: INTERNAL MEDICINE
Payer: COMMERCIAL

## 2017-05-22 ENCOUNTER — TRANSFERRED RECORDS (OUTPATIENT)
Dept: HEALTH INFORMATION MANAGEMENT | Facility: CLINIC | Age: 57
End: 2017-05-22

## 2017-05-22 DIAGNOSIS — R05.9 COUGH: ICD-10-CM

## 2017-05-22 PROCEDURE — 25000128 H RX IP 250 OP 636: Performed by: RADIOLOGY

## 2017-05-22 PROCEDURE — 25000125 ZZHC RX 250: Performed by: RADIOLOGY

## 2017-05-22 PROCEDURE — 71260 CT THORAX DX C+: CPT

## 2017-05-22 RX ORDER — IOPAMIDOL 755 MG/ML
500 INJECTION, SOLUTION INTRAVASCULAR ONCE
Status: COMPLETED | OUTPATIENT
Start: 2017-05-22 | End: 2017-05-22

## 2017-05-22 RX ORDER — IOPAMIDOL 755 MG/ML
500 INJECTION, SOLUTION INTRAVASCULAR ONCE
Status: DISCONTINUED | OUTPATIENT
Start: 2017-05-22 | End: 2017-05-22 | Stop reason: CLARIF

## 2017-05-22 RX ADMIN — IOPAMIDOL 75 ML: 755 INJECTION, SOLUTION INTRAVENOUS at 14:55

## 2017-05-22 RX ADMIN — SODIUM CHLORIDE 75 ML: 9 INJECTION, SOLUTION INTRAVENOUS at 14:56

## 2017-08-16 DIAGNOSIS — F41.8 DEPRESSION WITH ANXIETY: ICD-10-CM

## 2017-08-16 DIAGNOSIS — K58.2 IRRITABLE BOWEL SYNDROME WITH BOTH CONSTIPATION AND DIARRHEA: ICD-10-CM

## 2017-08-16 NOTE — TELEPHONE ENCOUNTER
Sertraline     Last Written Prescription Date: 02/15/2017  Last Fill Quantity: 180, # refills: 1  Last Office Visit with [facility] primary care provider:  05/02/2017        Last PHQ-9 score on record=   PHQ-9 SCORE 5/2/2017   Total Score -   Total Score 11       Dicyclomine      Last Written Prescription Date: 12/02/2016  Last Fill Quantity: 240,  # refills: 0   Last Office Visit with [facility], [facility] or [facility] prescribing provider: 05/02/2017    Obdulia Jackson MA

## 2017-08-21 RX ORDER — DICYCLOMINE HYDROCHLORIDE 10 MG/1
CAPSULE ORAL
Qty: 240 CAPSULE | Refills: 0 | Status: SHIPPED | OUTPATIENT
Start: 2017-08-21 | End: 2017-11-14

## 2017-08-21 NOTE — TELEPHONE ENCOUNTER
Medication is being filled for 1 time refill only due to:  Patient needs to be seen because due for mood follow up and establish care.     Rosey Talley, RN, BSN

## 2017-08-21 NOTE — TELEPHONE ENCOUNTER
Spoke with pt and gave information below. Pt is scheduled to est care and med check in September.    Leeann France CMA ([facility])

## 2017-09-07 NOTE — PROGRESS NOTES
"  SUBJECTIVE:                                                    Sun De La Garza is a 57 year old female who presents to clinic today for the following health issues:  {Provider please address medication reconciliation discrepancies--rooming staff please delete if no med/rec issues}    HPI    Chronic Pain Follow-Up       Type / Location of Pain: ***  Analgesia/pain control:       Recent changes:  { :795517339}      Overall control: { :641670::\"Tolerable with discomfort\"}  Activity level/function:      Daily activities:  { :208032}    Work:  { :995027}  Adverse effects:  { :848223::\"No\"}  Adherance    Taking medication as directed?  { :371174::\"Yes\"}    Participating in other treatments: { :251013::\"yes\"}  Risk Factors:    Sleep:  { :897274}    Mood/anxiety:  { :721994::\"controlled\"}    Recent family or social stressors:  { :023657::\"none noted\"}    Other aggravating factors: { :028469::\"none\"}  PHQ-9 SCORE 3/2/2016 10/26/2016 5/2/2017   Total Score - - -   Total Score 8 8 11     MARIAN-7 SCORE 3/2/2016 3/2/2016 10/26/2016   Total Score - - -   Total Score 2 2 3     Encounter-Level CSA:     There are no encounter-level csa.              Problem list and histories reviewed & adjusted, as indicated.  Additional history: {NONE - AS DOCUMENTED:437947::\"as documented\"}    {ACUTE Problem SUPERLIST - brief histories:531044}    {HIST REVIEW/ LINKS 2:295564}    {PROVIDER CHARTING PREFERENCE:683764}  "

## 2017-09-13 ENCOUNTER — OFFICE VISIT (OUTPATIENT)
Dept: FAMILY MEDICINE | Facility: OTHER | Age: 57
End: 2017-09-13
Payer: COMMERCIAL

## 2017-09-13 VITALS
HEART RATE: 88 BPM | WEIGHT: 174.9 LBS | DIASTOLIC BLOOD PRESSURE: 84 MMHG | TEMPERATURE: 98.2 F | RESPIRATION RATE: 16 BRPM | HEIGHT: 64 IN | BODY MASS INDEX: 29.86 KG/M2 | SYSTOLIC BLOOD PRESSURE: 122 MMHG

## 2017-09-13 DIAGNOSIS — F41.8 DEPRESSION WITH ANXIETY: Primary | ICD-10-CM

## 2017-09-13 DIAGNOSIS — K58.2 IRRITABLE BOWEL SYNDROME WITH BOTH CONSTIPATION AND DIARRHEA: ICD-10-CM

## 2017-09-13 DIAGNOSIS — G47.30 SLEEP APNEA, UNSPECIFIED TYPE: ICD-10-CM

## 2017-09-13 PROCEDURE — 99214 OFFICE O/P EST MOD 30 MIN: CPT | Performed by: PHYSICIAN ASSISTANT

## 2017-09-13 RX ORDER — VENLAFAXINE HYDROCHLORIDE 37.5 MG/1
37.5 CAPSULE, EXTENDED RELEASE ORAL DAILY
Qty: 30 CAPSULE | Refills: 1 | Status: SHIPPED | OUTPATIENT
Start: 2017-09-13 | End: 2017-10-20

## 2017-09-13 ASSESSMENT — ANXIETY QUESTIONNAIRES
7. FEELING AFRAID AS IF SOMETHING AWFUL MIGHT HAPPEN: NEARLY EVERY DAY
3. WORRYING TOO MUCH ABOUT DIFFERENT THINGS: NEARLY EVERY DAY
1. FEELING NERVOUS, ANXIOUS, OR ON EDGE: SEVERAL DAYS
GAD7 TOTAL SCORE: 14
5. BEING SO RESTLESS THAT IT IS HARD TO SIT STILL: NOT AT ALL
6. BECOMING EASILY ANNOYED OR IRRITABLE: MORE THAN HALF THE DAYS
2. NOT BEING ABLE TO STOP OR CONTROL WORRYING: NEARLY EVERY DAY

## 2017-09-13 ASSESSMENT — PATIENT HEALTH QUESTIONNAIRE - PHQ9
5. POOR APPETITE OR OVEREATING: MORE THAN HALF THE DAYS
SUM OF ALL RESPONSES TO PHQ QUESTIONS 1-9: 12

## 2017-09-13 ASSESSMENT — PAIN SCALES - GENERAL: PAINLEVEL: NO PAIN (0)

## 2017-09-13 NOTE — MR AVS SNAPSHOT
"              After Visit Summary   9/13/2017    Sun De La Garza    MRN: [id number]           Patient Information     Date Of Birth          1960        Visit Information        Provider Department      9/13/2017 3:00 PM Denise Schulte PA-C [facility]        Today's Diagnoses     Depression with anxiety    -  1    Irritable bowel syndrome with both constipation and diarrhea        Sleep apnea, unspecified type          Care Instructions    further taper down to 25 mg ( 1/2 pill)  of sertraline daily for 2 week then stop.  Now you can start the low dosage of venlafaxine 37.5 mg pill once daily.  Lets do a phone visit in 1 month to see how you are doing.     make a nurse bp recheck in about 2 weeks to make sure blood pressure is not increasing          Follow-ups after your visit        Who to contact     If you have questions or need follow up information about today's clinic visit or your schedule please contact [facility] directly at [phone].  Normal or non-critical lab and imaging results will be communicated to you by [employer], letter or phone within 4 business days after the clinic has received the results. If you do not hear from us within 7 days, please contact the clinic through Itand[employer] or phone. If you have a critical or abnormal lab result, we will notify you by phone as soon as possible.  Submit refill requests through [employer] or call your pharmacy and they will forward the refill request to us. Please allow 3 business days for your refill to be completed.          Additional Information About Your Visit        MyChart Information     [employer] lets you send messages to your doctor, view your test results, renew your prescriptions, schedule appointments and more. To sign up, go to [email or web address] . Click on \"Log in\" on the left side of the screen, which will take you to the Welcome page. Then click on \"Sign up Now\" on the right side of the page.     You " "will be asked to enter the access code listed below, as well as some personal information. Please follow the directions to create your username and password.     Your access code is: [email or web address]  Expires: 2017  3:30 PM     Your access code will  in 90 days. If you need help or a new code, please call your [place] clinic or [phone].        Care EveryWhere ID     This is your Care EveryWhere ID. This could be used by other organizations to access your [place] medical records  [id number]        Your Vitals Were     Pulse Temperature Respirations Height Breastfeeding? BMI (Body Mass Index)    88 98.2  F (36.8  C) (Temporal) 16 5' 3.5\" (1.613 m) No 30.5 kg/m2       Blood Pressure from Last 3 Encounters:   17 122/84   17 120/78   02/15/17 118/66    Weight from Last 3 Encounters:   17 174 lb 14.4 oz (79.3 kg)   17 177 lb 11.2 oz (80.6 kg)   02/15/17 174 lb 1.6 oz (79 kg)              Today, you had the following     No orders found for display         Today's Medication Changes          These changes are accurate as of: 17  3:30 PM.  If you have any questions, ask your nurse or doctor.               Start taking these medicines.        Dose/Directions    venlafaxine 37.5 MG 24 hr capsule   Commonly known as:  EFFEXOR-XR   Used for:  Depression with anxiety   Started by:  Denise Schulte PA-C        Dose:  37.5 mg   Take 1 capsule (37.5 mg) by mouth daily   Quantity:  30 capsule   Refills:  1            Where to get your medicines      These medications were sent to [facility] [address]   [address] [place] [address]     Phone:  [phone]     venlafaxine 37.5 MG 24 hr capsule                Primary Care Provider Office Phone # Fax #    Denise Schulte PA-C [phone] [address] [place] [address]        Equal Access to Services     REGINA CANALES AH: Hadkizzy Mora " jaylon dejahyu belleroxana armijo. So [facility] [place] [phone].    ATENCIÓN: Si gladys orozco, tiene a conrad disposición servicios gratuitos de asistencia lingüística. Amy al [phone].    We comply with applicable federal civil rights laws and [place] laws. We do not discriminate on the basis of race, color, national origin, age, disability sex, sexual orientation or gender identity.            Thank you!     Thank you for choosing [facility]  for your care. Our goal is always to provide you with excellent care. Hearing back from our patients is one way we can continue to improve our services. Please take a few minutes to complete the written survey that you may receive in the mail after your visit with us. Thank you!             Your Updated Medication List - Protect others around you: Learn how to safely use, store and throw away your medicines at www.disposemymeds.org.          This list is accurate as of: 9/13/17  3:30 PM.  Always use your most recent med list.                   Brand Name Dispense Instructions for use Diagnosis    BABY ASPIRIN 81 MG chewable tablet   Generic drug:  aspirin      Take 81 mg by mouth daily.        dicyclomine 10 MG capsule    BENTYL    240 capsule    TAKE 1 CAPSULE TWICE A DAY BEFORE MEALS    Irritable bowel syndrome with both constipation and diarrhea       omeprazole 40 MG capsule    priLOSEC    90 capsule    Take 1 capsule (40 mg) by mouth daily Take 30-60 minutes before a meal.    Gastroesophageal reflux disease with esophagitis       sertraline 50 MG tablet    ZOLOFT    60 tablet    Take 2 tablets (100 mg) by mouth daily    Depression with anxiety       venlafaxine 37.5 MG 24 hr capsule    EFFEXOR-XR    30 capsule    Take 1 capsule (37.5 mg) by mouth daily    Depression with anxiety

## 2017-09-13 NOTE — PATIENT INSTRUCTIONS
further taper down to 25 mg ( 1/2 pill)  of sertraline daily for 2 week then stop.  Now you can start the low dosage of venlafaxine 37.5 mg pill once daily.  Lets do a phone visit in 1 month to see how you are doing.     make a nurse bp recheck in about 2 weeks to make sure blood pressure is not increasing

## 2017-09-13 NOTE — PROGRESS NOTES
SUBJECTIVE:                                                    Sun De La Garza is a 57 year old female who presents to clinic today for the following health issues:    HPI  She is here to establish care      Depression and Anxiety Follow-Up--     Status since last visit: Patient is on Zoloft but  she states it can be better. She states she has melt downs and she would like to try something else. Patient states she is drowsy, bloated, and just fatigued all the time. She states she has been on this medication for 3 years.  She has already tapered down to 50 mg.She is interested in a different medication.  See PHQ 9 and MARIAN 7 questionnaires for symptoms.     Other associated symptoms:None    Complicating factors:     Significant life event: No     Current substance abuse: None    PHQ-9 SCORE 3/2/2016 10/26/2016 5/2/2017   Total Score - - -   Total Score 8 8 11     MARIAN-7 SCORE 3/2/2016 3/2/2016 10/26/2016   Total Score - - -   Total Score 2 2 3       PHQ-9  English  PHQ-9   Any Language  GAD7        Problem list and histories reviewed & adjusted, as indicated.  Additional history: She has a history of irritable bowel syndrome.  She has both the diarrhea and constipation type.  She saw gastroenterology who recommended Bentyl. This has given her some improvement. She has noticed the general progression though is worsening. She is trying to notice what foods bother her and avoid the triggers. She has noticed for sure tomato based foods are bothersome. She would typically need to have diarrhea within 20 minutes of a meal. She has severe diarrhea that bothers her hemorrhoids. She has had colonoscopy and upper GI endoscopy. She is even tried a gluten-free diet that was not helpful.    She does have a history of sleep apnea. She uses a C Pap machine she states she loves her C Pap        BP Readings from Last 3 Encounters:   09/13/17 122/84   05/02/17 120/78   02/15/17 118/66    Wt Readings from Last 3 Encounters:   09/13/17  "174 lb 14.4 oz (79.3 kg)   05/02/17 177 lb 11.2 oz (80.6 kg)   02/15/17 174 lb 1.6 oz (79 kg)                  Labs reviewed in EPIC      ROS:  C: NEGATIVE for fever, chills, change in weight  E/M: NEGATIVE for ear, mouth and throat problems  R: NEGATIVE for significant cough or SOB  CV: NEGATIVE for chest pain, palpitations or peripheral edema  GI: irritable bowel syndrome  PSYCHIATRIC: See PHQ 9 and MARIAN 7 questionnaires for symptoms.     OBJECTIVE:     /84  Pulse 88  Temp 98.2  F (36.8  C) (Temporal)  Resp 16  Ht 5' 3.5\" (1.613 m)  Wt 174 lb 14.4 oz (79.3 kg)  Breastfeeding? No  BMI 30.5 kg/m2  Body mass index is 30.5 kg/(m^2).  GENERAL: healthy, alert and no distress  NECK: no adenopathy, no asymmetry, masses, or scars and thyroid normal to palpation  RESP: lungs clear to auscultation - no rales, rhonchi or wheezes  CV: regular rate and rhythm, normal S1 S2, no S3 or S4, no murmur, click or rub, no peripheral edema and peripheral pulses strong  ABDOMEN: soft, nontender, no hepatosplenomegaly, no masses and bowel sounds normal  MS: no gross musculoskeletal defects noted, no edema  PSYCH: mentation appears normal, affect normal/bright    Diagnostic Test Results:  No results found for this or any previous visit (from the past 24 hour(s)).    ASSESSMENT/PLAN:         1. Depression with anxiety  She will taper the rest of the way off of Zoloft. We will start low-dose of Effexor also to help with her hot flashes and night sweats  - venlafaxine (EFFEXOR-XR) 37.5 MG 24 hr capsule; Take 1 capsule (37.5 mg) by mouth daily  Dispense: 30 capsule; Refill: 1  She will come in in 2 weeks to check her blood pressure with flow nurse. I will do a phone visit in 1 month.    2. Irritable bowel syndrome with both constipation and diarrhea  Continue her current treatment, continue diet journal to avoid particularly offending foods    3. Sleep apnea, unspecified type    Continue with c pap machine       This chart " documentation was completed in part with Dragon voice recognition software.  Documentation is reviewed after completion, however, some words and grammatical errors may remain.  JESU Jackson PA-C  [facility]  Electronically signed by Denise Schulte PA-C

## 2017-09-13 NOTE — NURSING NOTE
"Chief Complaint   Patient presents with     [facility] Care     Recheck Medication     Panel Management     MyChart, Flu shot, Honoring choices, phq, catarina, Urine drug screen       Initial /84  Pulse 88  Temp 98.2  F (36.8  C) (Temporal)  Resp 16  Ht 5' 3.5\" (1.613 m)  Wt 174 lb 14.4 oz (79.3 kg)  Breastfeeding? No  BMI 30.5 kg/m2 Estimated body mass index is 30.5 kg/(m^2) as calculated from the following:    Height as of this encounter: 5' 3.5\" (1.613 m).    Weight as of this encounter: 174 lb 14.4 oz (79.3 kg).  Medication Reconciliation: complete    "

## 2017-09-14 ASSESSMENT — ANXIETY QUESTIONNAIRES: GAD7 TOTAL SCORE: 14

## 2017-10-16 ENCOUNTER — ALLIED HEALTH/NURSE VISIT (OUTPATIENT)
Dept: FAMILY MEDICINE | Facility: OTHER | Age: 57
End: 2017-10-16
Payer: COMMERCIAL

## 2017-10-16 ENCOUNTER — TELEPHONE (OUTPATIENT)
Dept: FAMILY MEDICINE | Facility: OTHER | Age: 57
End: 2017-10-16

## 2017-10-16 VITALS — HEART RATE: 88 BPM | DIASTOLIC BLOOD PRESSURE: 88 MMHG | SYSTOLIC BLOOD PRESSURE: 136 MMHG

## 2017-10-16 DIAGNOSIS — Z01.30 BP CHECK: Primary | ICD-10-CM

## 2017-10-16 PROCEDURE — 99207 ZZC NO CHARGE NURSE ONLY: CPT

## 2017-10-16 NOTE — MR AVS SNAPSHOT
"              After Visit Summary   10/16/2017    Sun De La Garza    MRN: [id number]           Patient Information     Date Of Birth          1960        Visit Information        Provider Department      10/16/2017 4:00 PM NL FLOAT NURSE [facility]        Today's Diagnoses     BP check    -  1       Follow-ups after your visit        Your next 10 appointments already scheduled     Oct 16, 2017  4:00 PM CDT   Nurse Only with NL FLOAT NURSE [facility] ([facility])    [address] ([place] [address] [phone]              Who to contact     If you have questions or need follow up information about today's clinic visit or your schedule please contact [facility] directly at [phone].  Normal or non-critical lab and imaging results will be communicated to you by MyChart, letter or phone within 4 business days after the clinic has received the results. If you do not hear from us within 7 days, please contact the clinic through MyChart or phone. If you have a critical or abnormal lab result, we will notify you by phone as soon as possible.  Submit refill requests through [email or web address] or call your pharmacy and they will forward the refill request to us. Please allow 3 business days for your refill to be completed.          Additional Information About Your Visit        MyChart Information     [email or web address] lets you send messages to your doctor, view your test results, renew your prescriptions, schedule appointments and more. To sign up, go to [email or web address] . Click on \"Log in\" on the left side of the screen, which will take you to the Welcome page. Then click on \"Sign up Now\" on the right side of the page.     You will be asked to enter the access code listed below, as well as some personal information. Please follow the directions to create your username and password.     Your access code is: [email or web address]  Expires: 12/12/2017  " 3:30 PM     Your access code will  in 90 days. If you need help or a new code, please call your [place] clinic or [phone].        Care EveryWhere ID     This is your Care EveryWhere ID. This could be used by other organizations to access your [place] medical records  [id number]        Your Vitals Were     Pulse                   88            Blood Pressure from Last 3 Encounters:   10/16/17 136/88   17 122/84   17 120/78    Weight from Last 3 Encounters:   17 174 lb 14.4 oz (79.3 kg)   17 177 lb 11.2 oz (80.6 kg)   02/15/17 174 lb 1.6 oz (79 kg)              Today, you had the following     No orders found for display       Primary Care Provider Office Phone # Fax #    Denise Schulte PA-C [phone] [address] [place] [address]        Equal Access to Services     REGINA [facility]ESSICA : Hadii aad ku hadasho Soomaali, waaxda luqadaha, qaybta kaalmada adeegyada, waxay perezin hayvanesan cecy quintero . So [facility] [phone].    ATENCIÓN: Si habla español, tiene a conrad disposición servicios gratuitos de asistencia lingüística. Llame al [phone].    We comply with applicable federal civil rights laws and [place] laws. We do not discriminate on the basis of race, color, national origin, age, disability, sex, sexual orientation, or gender identity.            Thank you!     Thank you for choosing [facility]  for your care. Our goal is always to provide you with excellent care. Hearing back from our patients is one way we can continue to improve our services. Please take a few minutes to complete the written survey that you may receive in the mail after your visit with us. Thank you!             Your Updated Medication List - Protect others around you: Learn how to safely use, store and throw away your medicines at www.disposemymeds.org.          This list is accurate as of: 10/16/17  3:50 PM.  Always use your most recent med list.                    Brand Name Dispense Instructions for use Diagnosis    BABY ASPIRIN 81 MG chewable tablet   Generic drug:  aspirin      Take 81 mg by mouth daily.        dicyclomine 10 MG capsule    BENTYL    240 capsule    TAKE 1 CAPSULE TWICE A DAY BEFORE MEALS    Irritable bowel syndrome with both constipation and diarrhea       omeprazole 40 MG capsule    priLOSEC    90 capsule    Take 1 capsule (40 mg) by mouth daily Take 30-60 minutes before a meal.    Gastroesophageal reflux disease with esophagitis       venlafaxine 37.5 MG 24 hr capsule    EFFEXOR-XR    30 capsule    Take 1 capsule (37.5 mg) by mouth daily    Depression with anxiety

## 2017-10-16 NOTE — Clinical Note
Patient was in today had BP checked and will return on Friday for a recheck. Tamanna Dick CMA ([facility])

## 2017-10-16 NOTE — NURSING NOTE
Sun De La Garza is a 57 year old female who comes in today for a Blood Pressure check because of new medication.    *Document pulse and BP  *Use new set of vitals button for multiple readings.  *Use extended vitals for orthostatic    Vitals as recorded, a regular cuff was used.    Patient is taking medication as prescribed  Patient is tolerating medications well.  Patient is not monitoring Blood Pressure at home.  Average readings if yes are     Current complaints: none    Disposition: follow-up as indicated by MD/BLACK and results routed to MD/BLACK Dick CMA ([facility])

## 2017-10-16 NOTE — TELEPHONE ENCOUNTER
Reason for Call:  Form, our goal is to have forms completed with 72 hours, however, some forms may require a visit or additional information.    Type of letter, form or note:  FMLA    Who is the form from?: [employer] (if other please explain)    Where did the form come from: Patient or family brought in       What clinic location was the form placed at?: [facility] - [phone]    Where the form was placed: 's Box    What number is listed as a contact on the form?: n/a       Additional comments: n/a    Call taken on 10/16/2017 at 3:45 PM by Carolynn Chao

## 2017-10-17 NOTE — TELEPHONE ENCOUNTER
Called and spoke with patient and gave her message below. Scheduled patient this Friday with Preston Salcedo.  Kristina Mendez MA

## 2017-10-17 NOTE — TELEPHONE ENCOUNTER
This form requires OV, she is due to recheck med changes one month ago anyway, needs 30 min  Denise Schulte PA-C

## 2017-10-17 NOTE — TELEPHONE ENCOUNTER
Left message for pt to return call, when call is returned give information below and help schedule appt for forms and med check.      Leeann France CMA ([facility])

## 2017-10-17 NOTE — TELEPHONE ENCOUNTER
Patient returned call and was given message below. Patient states she needs this form filled out by 10/27/2017 can she see another provider?    Thank you Carolynn

## 2017-10-18 NOTE — PROGRESS NOTES
SUBJECTIVE:                                                    Sun De La Garza is a 57 year old female who presents to clinic today for the following health issues:    efficient    HPI    Patient presents to clinic for FMLA Forms  Patient reports a four-year history of IBD. She has not been in to see GI specialist in a while. She notes increasing flares related to her IBD and this limits her ability to complete her tasks at work. Advised that she needs to see gastroenterology as soon as practical. I would have her do this to make sure that we are doing everything that we can to help her with her IBD.    Problem list and histories reviewed & adjusted, as indicated.  Additional history: as documented    Patient Active Problem List   Diagnosis     CARDIOVASCULAR SCREENING; LDL GOAL LESS THAN 160     Chronic abdominal pain     Skin tag     Sebaceous cyst     Vitamin D deficiency     Depression with anxiety     IBS (irritable bowel syndrome)     Diverticulosis of large intestine     Sleep apnea, unspecified type     Past Surgical History:   Procedure Laterality Date     COLONOSCOPY N/A 9/22/2014    Procedure: COLONOSCOPY;  Surgeon: Mj Morales MD;  Location:  GI     ESOPHAGOSCOPY, GASTROSCOPY, DUODENOSCOPY (EGD), COMBINED N/A 9/22/2014    Procedure: COMBINED ESOPHAGOSCOPY, GASTROSCOPY, DUODENOSCOPY (EGD), BIOPSY SINGLE OR MULTIPLE;  Surgeon: Mj Morales MD;  Location:  GI     GYN SURGERY      ablation       Social History   Substance Use Topics     Smoking status: Former Smoker     Quit date: 3/4/2012     Smokeless tobacco: Never Used     Alcohol use No      Comment: socailly     Family History   Problem Relation Age of Onset     Other Cancer Father      Melanoma Father      Hypertension Father      Hyperlipidemia Mother      Hypertension Brother      Hypertension Brother      Anesthesia Reaction No family hx of              ROS:  Constitutional, HEENT, cardiovascular, pulmonary, gi and gu systems are  negative, except as otherwise noted.      OBJECTIVE:   /64 (Cuff Size: Adult Regular)  Pulse 64  Temp 98.4  F (36.9  C) (Temporal)  Resp 16  Wt 176 lb 1.6 oz (79.9 kg)  BMI 30.71 kg/m2  Body mass index is 30.71 kg/(m^2).  GENERAL: healthy, alert and no distress  NECK: no adenopathy, no asymmetry, masses, or scars and thyroid normal to palpation  RESP: lungs clear to auscultation - no rales, rhonchi or wheezes  CV: regular rate and rhythm, normal S1 S2, no S3 or S4, no murmur, click or rub, no peripheral edema and peripheral pulses strong  ABDOMEN: tenderness LLQ and bowel sounds normal  MS: no gross musculoskeletal defects noted, no edema    Diagnostic Test Results:  No results found for this or any previous visit (from the past 24 hour(s)).    ASSESSMENT/PLAN:     1. Chronic abdominal pain  2. Diverticulosis of large intestine without hemorrhage  3. Irritable bowel syndrome with both constipation and diarrhea  Recent increase in signs and symptoms dictate that we need to have GI review with this diagnosis. Advised that she increase her Bentyl which has worked well for her in the past up to 4 times a day. She is to follow-up as directed above. [place] paperwork reviewed from her last visit with Dr. ramin Stiles. I completed it today for her. We will send the original to the site of record.  - GASTROENTEROLOGY ADULT REF CONSULT ONLY  - dicyclomine (BENTYL) 10 MG capsule; Take 1 capsule (10 mg) by mouth 4 times daily (before meals and nightly)  Dispense: 240 capsule; Refill: 1    4. Depression with anxiety  States that she's been doing very well with respect to this and the low-dose Effexor has been doing wonders. Refilled for 6 months. Follow-up in 6 months.  - venlafaxine (EFFEXOR-XR) 37.5 MG 24 hr capsule; Take 1 capsule (37.5 mg) by mouth daily  Dispense: 90 capsule; Refill: 1    Preston Salcedo PA-C  [facility]

## 2017-10-20 ENCOUNTER — OFFICE VISIT (OUTPATIENT)
Dept: FAMILY MEDICINE | Facility: OTHER | Age: 57
End: 2017-10-20
Payer: COMMERCIAL

## 2017-10-20 VITALS
TEMPERATURE: 98.4 F | BODY MASS INDEX: 30.71 KG/M2 | WEIGHT: 176.1 LBS | SYSTOLIC BLOOD PRESSURE: 108 MMHG | RESPIRATION RATE: 16 BRPM | DIASTOLIC BLOOD PRESSURE: 64 MMHG | HEART RATE: 64 BPM

## 2017-10-20 DIAGNOSIS — Z23 NEED FOR PROPHYLACTIC VACCINATION AND INOCULATION AGAINST INFLUENZA: ICD-10-CM

## 2017-10-20 DIAGNOSIS — K57.30 DIVERTICULOSIS OF LARGE INTESTINE WITHOUT HEMORRHAGE: ICD-10-CM

## 2017-10-20 DIAGNOSIS — R10.9 CHRONIC ABDOMINAL PAIN: Primary | ICD-10-CM

## 2017-10-20 DIAGNOSIS — K58.2 IRRITABLE BOWEL SYNDROME WITH BOTH CONSTIPATION AND DIARRHEA: ICD-10-CM

## 2017-10-20 DIAGNOSIS — G89.29 CHRONIC ABDOMINAL PAIN: Primary | ICD-10-CM

## 2017-10-20 DIAGNOSIS — F41.8 DEPRESSION WITH ANXIETY: ICD-10-CM

## 2017-10-20 PROCEDURE — 99214 OFFICE O/P EST MOD 30 MIN: CPT | Mod: 25 | Performed by: PHYSICIAN ASSISTANT

## 2017-10-20 PROCEDURE — 90471 IMMUNIZATION ADMIN: CPT | Performed by: PHYSICIAN ASSISTANT

## 2017-10-20 PROCEDURE — 90686 IIV4 VACC NO PRSV 0.5 ML IM: CPT | Performed by: PHYSICIAN ASSISTANT

## 2017-10-20 RX ORDER — DICYCLOMINE HYDROCHLORIDE 10 MG/1
10 CAPSULE ORAL
Qty: 240 CAPSULE | Refills: 1 | Status: SHIPPED | OUTPATIENT
Start: 2017-10-20 | End: 2017-12-31

## 2017-10-20 RX ORDER — VENLAFAXINE HYDROCHLORIDE 37.5 MG/1
37.5 CAPSULE, EXTENDED RELEASE ORAL DAILY
Qty: 90 CAPSULE | Refills: 1 | Status: SHIPPED | OUTPATIENT
Start: 2017-10-20 | End: 2018-04-19

## 2017-10-20 RX ORDER — VENLAFAXINE HYDROCHLORIDE 37.5 MG/1
37.5 CAPSULE, EXTENDED RELEASE ORAL DAILY
Qty: 90 CAPSULE | Refills: 1 | Status: SHIPPED | OUTPATIENT
Start: 2017-10-20 | End: 2017-10-20

## 2017-10-20 ASSESSMENT — PAIN SCALES - GENERAL: PAINLEVEL: NO PAIN (0)

## 2017-10-20 NOTE — PROGRESS NOTES
Injectable Influenza Immunization Documentation    1.  Is the person to be vaccinated sick today?   No    2. Does the person to be vaccinated have an allergy to a component   of the vaccine?   No  Egg Allergy Algorithm Link    3. Has the person to be vaccinated ever had a serious reaction   to influenza vaccine in the past?   No    4. Has the person to be vaccinated ever had Guillain-Barré syndrome?   No    Form completed by Tegan Duncan, CMA  Per orders of Preston Salcedo, injection of Flu shot given by Tegan Duncan. Patient instructed to remain in clinic for 15 minutes afterwards, and to report any adverse reaction to me immediately.

## 2017-10-20 NOTE — NURSING NOTE
"Chief Complaint   Patient presents with     FMLA form     Panel Management     mychart, flu shot, honoring choices, phq, catarina, urine drug screen       Initial /64 (Cuff Size: Adult Regular)  Pulse 64  Temp 98.4  F (36.9  C) (Temporal)  Resp 16  Wt 176 lb 1.6 oz (79.9 kg)  BMI 30.71 kg/m2 Estimated body mass index is 30.71 kg/(m^2) as calculated from the following:    Height as of 9/13/17: 5' 3.5\" (1.613 m).    Weight as of this encounter: 176 lb 1.6 oz (79.9 kg).  Medication Reconciliation: complete   Tamanna Dick CMA ([facility])    "

## 2017-10-20 NOTE — MR AVS SNAPSHOT
After Visit Summary   10/20/2017    Sun De La Garza    MRN: [id number]           Patient Information     Date Of Birth          1960        Visit Information        Provider Department      10/20/2017 11:00 AM Preston Spaulding PA-C [facility]        Today's Diagnoses     Chronic abdominal pain    -  1    Diverticulosis of large intestine without hemorrhage        Irritable bowel syndrome with both constipation and diarrhea        Depression with anxiety           Follow-ups after your visit        Additional Services     GASTROENTEROLOGY ADULT REF CONSULT ONLY       Preferred Location: [facility] [phone], [facility]: [phone], [facility] [phone], [facility] [phone] and [facility] [phone]      Please be aware that coverage of these services is subject to the terms and limitations of your health insurance plan.  Call member services at your health plan with any benefit or coverage questions.  Any procedures must be performed at a [facility] facility OR coordinated by your clinic's referral office.    Please bring the following with you to your appointment:    (1) Any X-Rays, CTs or MRIs which have been performed.  Contact the facility where they were done to arrange for  prior to your scheduled appointment.    (2) List of current medications   (3) This referral request   (4) Any documents/labs given to you for this referral                  Who to contact     If you have questions or need follow up information about today's clinic visit or your schedule please contact [facility] directly at [phone].  Normal or non-critical lab and imaging results will be communicated to you by MyChart, letter or phone within 4 business days after the clinic has received the results. If you do not hear from us within 7 days, please contact the clinic through MyChart or phone. If you have a critical or  "abnormal lab result, we will notify you by phone as soon as possible.  Submit refill requests through [employer] or call your pharmacy and they will forward the refill request to us. Please allow 3 business days for your refill to be completed.          Additional Information About Your Visit        MyChart Information     [employer] lets you send messages to your doctor, view your test results, renew your prescriptions, schedule appointments and more. To sign up, go to [email or web address] . Click on \"Log in\" on the left side of the screen, which will take you to the Welcome page. Then click on \"Sign up Now\" on the right side of the page.     You will be asked to enter the access code listed below, as well as some personal information. Please follow the directions to create your username and password.     Your access code is: [email or web address]  Expires: 2017  3:30 PM     Your access code will  in 90 days. If you need help or a new code, please call your [facility] clinic or [phone].        Care EveryWhere ID     This is your Care EveryWhere ID. This could be used by other organizations to access your [facility] medical records  [id number]        Your Vitals Were     Pulse Temperature Respirations BMI (Body Mass Index)          64 98.4  F (36.9  C) (Temporal) 16 30.71 kg/m2         Blood Pressure from Last 3 Encounters:   10/20/17 108/64   10/16/17 136/88   17 122/84    Weight from Last 3 Encounters:   10/20/17 176 lb 1.6 oz (79.9 kg)   17 174 lb 14.4 oz (79.3 kg)   17 177 lb 11.2 oz (80.6 kg)              We Performed the Following     GASTROENTEROLOGY ADULT REF CONSULT ONLY          Where to get your medicines      These medications were sent to [facility] - [id number]  [address] [address]     Phone:  [phone]     venlafaxine 37.5 MG 24 hr capsule          Primary Care Provider Office Phone # Fax #    Denise Schulte PA-C " [phone]       [address] [place] [address]        Equal Access to Services     HEIDY CANALES : Hadii susanna fajardo nehakaren Kavitha, waperlitada luqharitha, qaybta kaalmada vaishali, roxana perezin hayaan joeyjeanne peña laVerenaestella torres. So [facility] [phone].    ATENCIÓN: Si habla español, tiene a conrad disposición servicios gratuitos de asistencia lingüística. Llame al [phone].    We comply with applicable federal civil rights laws and [place] laws. We do not discriminate on the basis of race, color, national origin, age, disability, sex, sexual orientation, or gender identity.            Thank you!     Thank you for choosing [facility] [place]  for your care. Our goal is always to provide you with excellent care. Hearing back from our patients is one way we can continue to improve our services. Please take a few minutes to complete the written survey that you may receive in the mail after your visit with us. Thank you!             Your Updated Medication List - Protect others around you: Learn how to safely use, store and throw away your medicines at www.disposemymeds.org.          This list is accurate as of: 10/20/17 11:10 AM.  Always use your most recent med list.                   Brand Name Dispense Instructions for use Diagnosis    BABY ASPIRIN 81 MG chewable tablet   Generic drug:  aspirin      Take 81 mg by mouth daily.        dicyclomine 10 MG capsule    BENTYL    240 capsule    TAKE 1 CAPSULE TWICE A DAY BEFORE MEALS    Irritable bowel syndrome with both constipation and diarrhea       omeprazole 40 MG capsule    priLOSEC    90 capsule    Take 1 capsule (40 mg) by mouth daily Take 30-60 minutes before a meal.    Gastroesophageal reflux disease with esophagitis       PROBIOTIC & ACIDOPHILUS EX ST PO           venlafaxine 37.5 MG 24 hr capsule    EFFEXOR-XR    90 capsule    Take 1 capsule (37.5 mg) by mouth daily    Depression with anxiety       vitamin B complex with vitamin C Tabs tablet       Take 1 tablet by mouth daily        VITAMIN D (CHOLECALCIFEROL) PO      Take by mouth daily

## 2017-11-14 ENCOUNTER — OFFICE VISIT (OUTPATIENT)
Dept: FAMILY MEDICINE | Facility: OTHER | Age: 57
End: 2017-11-14
Payer: COMMERCIAL

## 2017-11-14 VITALS
BODY MASS INDEX: 30.51 KG/M2 | TEMPERATURE: 97.8 F | WEIGHT: 175 LBS | OXYGEN SATURATION: 97 % | DIASTOLIC BLOOD PRESSURE: 70 MMHG | HEART RATE: 92 BPM | SYSTOLIC BLOOD PRESSURE: 124 MMHG

## 2017-11-14 DIAGNOSIS — R07.0 THROAT PAIN: Primary | ICD-10-CM

## 2017-11-14 DIAGNOSIS — J06.9 VIRAL URI: ICD-10-CM

## 2017-11-14 DIAGNOSIS — K21.00 GASTROESOPHAGEAL REFLUX DISEASE WITH ESOPHAGITIS: ICD-10-CM

## 2017-11-14 LAB
DEPRECATED S PYO AG THROAT QL EIA: NORMAL
SPECIMEN SOURCE: NORMAL

## 2017-11-14 PROCEDURE — 87880 STREP A ASSAY W/OPTIC: CPT | Performed by: PHYSICIAN ASSISTANT

## 2017-11-14 PROCEDURE — 99214 OFFICE O/P EST MOD 30 MIN: CPT | Performed by: PHYSICIAN ASSISTANT

## 2017-11-14 PROCEDURE — 87081 CULTURE SCREEN ONLY: CPT | Performed by: PHYSICIAN ASSISTANT

## 2017-11-14 RX ORDER — OMEPRAZOLE 40 MG/1
40 CAPSULE, DELAYED RELEASE ORAL DAILY
Qty: 90 CAPSULE | Refills: 3 | Status: SHIPPED | OUTPATIENT
Start: 2017-11-14 | End: 2018-11-01

## 2017-11-14 ASSESSMENT — PATIENT HEALTH QUESTIONNAIRE - PHQ9
SUM OF ALL RESPONSES TO PHQ QUESTIONS 1-9: 11
10. IF YOU CHECKED OFF ANY PROBLEMS, HOW DIFFICULT HAVE THESE PROBLEMS MADE IT FOR YOU TO DO YOUR WORK, TAKE CARE OF THINGS AT HOME, OR GET ALONG WITH OTHER PEOPLE: SOMEWHAT DIFFICULT
SUM OF ALL RESPONSES TO PHQ QUESTIONS 1-9: 11

## 2017-11-14 ASSESSMENT — ANXIETY QUESTIONNAIRES
GAD7 TOTAL SCORE: 8
GAD7 TOTAL SCORE: 8
5. BEING SO RESTLESS THAT IT IS HARD TO SIT STILL: NOT AT ALL
1. FEELING NERVOUS, ANXIOUS, OR ON EDGE: NOT AT ALL
4. TROUBLE RELAXING: NOT AT ALL
7. FEELING AFRAID AS IF SOMETHING AWFUL MIGHT HAPPEN: NOT AT ALL
GAD7 TOTAL SCORE: 8
6. BECOMING EASILY ANNOYED OR IRRITABLE: MORE THAN HALF THE DAYS
2. NOT BEING ABLE TO STOP OR CONTROL WORRYING: NEARLY EVERY DAY
3. WORRYING TOO MUCH ABOUT DIFFERENT THINGS: NEARLY EVERY DAY
7. FEELING AFRAID AS IF SOMETHING AWFUL MIGHT HAPPEN: NOT AT ALL

## 2017-11-14 ASSESSMENT — PAIN SCALES - GENERAL: PAINLEVEL: EXTREME PAIN (8)

## 2017-11-14 NOTE — NURSING NOTE
"Chief Complaint   Patient presents with     Pharyngitis       Initial /70  Pulse 92  Temp 97.8  F (36.6  C) (Temporal)  Wt 175 lb (79.4 kg)  SpO2 97%  BMI 30.51 kg/m2 Estimated body mass index is 30.51 kg/(m^2) as calculated from the following:    Height as of 9/13/17: 5' 3.5\" (1.613 m).    Weight as of this encounter: 175 lb (79.4 kg).  Medication Reconciliation: complete     Leeann France CMA (AAMA)    "

## 2017-11-14 NOTE — PROGRESS NOTES
SUBJECTIVE:   Sun De La Garza is a 57 year old female who presents to clinic today for the following health issues:      Acute Illness--   Acute illness concerns: possible strep  Onset: last Thursday , about 5 days    Fever: YES, subjective has not checked     Chills/Sweats: YES    Headache (location?): YES but also recently started Effexor     Sinus Pressure:no    Conjunctivitis:  YES- goupy in corners of the eyes     Ear Pain: no    Rhinorrhea: no     Congestion: no    Sore Throat: no     Cough: no, clears her throat to bring up the phlegm    Wheeze: no    Decreased Appetite: YES    Nausea: no    Vomiting: no    Diarrhea:  no    Dysuria/Freq.: no    Fatigue/Achiness: YES- fatigue    Sick/Strep Exposure: YES- boyfriend works at a middle school where strep is going around and coworker was out last week for strep     Therapies Tried and outcome: hot baths and vics vapor rub            Problem list and histories reviewed & adjusted, as indicated.  Additional history: as documented    BP Readings from Last 3 Encounters:   11/14/17 124/70   10/20/17 108/64   10/16/17 136/88    Wt Readings from Last 3 Encounters:   11/14/17 175 lb (79.4 kg)   10/20/17 176 lb 1.6 oz (79.9 kg)   09/13/17 174 lb 14.4 oz (79.3 kg)                  Labs reviewed in EPIC      Reviewed and updated as needed this visit by clinical staff     Reviewed and updated as needed this visit by Provider         ROS:  C: NEGATIVE for fever, chills, change in weight  ENT/MOUTH: As above  RESP:As above  CV: NEGATIVE for chest pain, palpitations or peripheral edema  GI: Her reflux is well controlled with her current medications no bowel changes or blood in her stool    OBJECTIVE:     /70  Pulse 92  Temp 97.8  F (36.6  C) (Temporal)  Wt 175 lb (79.4 kg)  SpO2 97%  BMI 30.51 kg/m2  Body mass index is 30.51 kg/(m^2).  GENERAL: healthy, alert and no distress  EYES: Eyes grossly normal to inspection  HENT: normal cephalic/atraumatic, ear canals and  TM's normal, nasal mucosa edematous , oropharynx clear, oral mucous membranes moist, tonsillar erythema and sinuses: not tender  NECK: no adenopathy, no asymmetry, masses, or scars and thyroid normal to palpation  RESP: lungs clear to auscultation - no rales, rhonchi or wheezes  CV: regular rate and rhythm, normal S1 S2, no S3 or S4, no murmur, click or rub, no peripheral edema and peripheral pulses strong  ABDOMEN: soft, nontender, no hepatosplenomegaly, no masses and bowel sounds normal  MS: no gross musculoskeletal defects noted, no edema  SKIN: no suspicious lesions or rashes  PSYCH: mentation appears normal, affect normal/bright    Diagnostic Test Results:  Results for orders placed or performed in visit on 11/14/17 (from the past 24 hour(s))   Strep, Rapid Screen   Result Value Ref Range    Specimen Description Throat     Rapid Strep A Screen       NEGATIVE: No Group A streptococcal antigen detected by immunoassay, await culture report.       ASSESSMENT/PLAN:         1. Viral URI  Fluids, rest, may try some Mucinex to see if helpful O antibiotics indicated at this time follow-up with continued worsening    2. Throat pain  May try salt water gargle  - Strep, Rapid Screen  - Beta strep group A culture    3. Gastroesophageal reflux disease with esophagitis  Well controlled, continue current meds  - omeprazole (PRILOSEC) 40 MG capsule; Take 1 capsule (40 mg) by mouth daily Take 30-60 minutes before a meal.  Dispense: 90 capsule; Refill: 3    This chart documentation was completed in part with Dragon voice recognition software.  Documentation is reviewed after completion, however, some words and grammatical errors may remain.  JESU Jackson PA-C  [facility]  Electronically signed by Denise Schulte PA-C

## 2017-11-14 NOTE — MR AVS SNAPSHOT
After Visit Summary   11/14/2017    Sun De La Garza    MRN: [id number]           Patient Information     Date Of Birth          1960        Visit Information        Provider Department      11/14/2017 11:00 AM Denise Schulte PA-C [facility]        Today's Diagnoses     Throat pain    -  1    Viral URI        Gastroesophageal reflux disease with esophagitis           Follow-ups after your visit        Who to contact     If you have questions or need follow up information about today's clinic visit or your schedule please contact [facility] directly at [phone].  Normal or non-critical lab and imaging results will be communicated to you by ReFashionerhart, letter or phone within 4 business days after the clinic has received the results. If you do not hear from us within 7 days, please contact the clinic through ReFashionerhart or phone. If you have a critical or abnormal lab result, we will notify you by phone as soon as possible.  Submit refill requests through [employer] or call your pharmacy and they will forward the refill request to us. Please allow 3 business days for your refill to be completed.          Additional Information About Your Visit        MyChart Information     [employer] gives you secure access to your electronic health record. If you see a primary care provider, you can also send messages to your care team and make appointments. If you have questions, please call your primary care clinic.  If you do not have a primary care provider, please call [phone] and they will assist you.        Care EveryWhere ID     This is your Care EveryWhere ID. This could be used by other organizations to access your [facility] medical records  [id number]        Your Vitals Were     Pulse Temperature Pulse Oximetry BMI (Body Mass Index)          92 97.8  F (36.6  C) (Temporal) 97% 30.51 kg/m2         Blood Pressure from Last 3 Encounters:   11/14/17 124/70   10/20/17 108/64    10/16/17 136/88    Weight from Last 3 Encounters:   11/14/17 175 lb (79.4 kg)   10/20/17 176 lb 1.6 oz (79.9 kg)   09/13/17 174 lb 14.4 oz (79.3 kg)              We Performed the Following     Beta strep group A culture     Strep, Rapid Screen          Where to get your medicines      These medications were sent to [facility] - [address]  [address] [place] [address]     Phone:  [phone]     omeprazole 40 MG capsule          Primary Care Provider Office Phone # Fax #    Denise Schulte PA-C [phone] [address] [place] [address]        Equal Access to Services     REG[place] CANALES : Hadii aad ku hadasho Soomaali, waaxda luqadaha, qaybta kaalmada adeegyada, roxana quintero . So [facility] [phone].    ATENCIÓN: Si habla español, tiene a conrad disposición servicios gratuitos de asistencia lingüística. [facility] [phone].    We comply with applicable federal civil rights laws and [place] laws. We do not discriminate on the basis of race, color, national origin, age, disability, sex, sexual orientation, or gender identity.            Thank you!     Thank you for choosing [facility]  for your care. Our goal is always to provide you with excellent care. Hearing back from our patients is one way we can continue to improve our services. Please take a few minutes to complete the written survey that you may receive in the mail after your visit with us. Thank you!             Your Updated Medication List - Protect others around you: Learn how to safely use, store and throw away your medicines at www.disposemymeds.org.          This list is accurate as of: 11/14/17 12:32 PM.  Always use your most recent med list.                   Brand Name Dispense Instructions for use Diagnosis    BABY ASPIRIN 81 MG chewable tablet   Generic drug:  aspirin      Take 81 mg by mouth daily.        dicyclomine 10  MG capsule    BENTYL    240 capsule    Take 1 capsule (10 mg) by mouth 4 times daily (before meals and nightly)    Chronic abdominal pain, Diverticulosis of large intestine without hemorrhage, Irritable bowel syndrome with both constipation and diarrhea       omeprazole 40 MG capsule    priLOSEC    90 capsule    Take 1 capsule (40 mg) by mouth daily Take 30-60 minutes before a meal.    Gastroesophageal reflux disease with esophagitis       PROBIOTIC & ACIDOPHILUS EX ST PO           venlafaxine 37.5 MG 24 hr capsule    EFFEXOR-XR    90 capsule    Take 1 capsule (37.5 mg) by mouth daily    Depression with anxiety       vitamin B complex with vitamin C Tabs tablet      Take 1 tablet by mouth daily        VITAMIN D (CHOLECALCIFEROL) PO      Take by mouth daily

## 2017-11-15 LAB
BACTERIA SPEC CULT: NORMAL
SPECIMEN SOURCE: NORMAL

## 2017-11-15 ASSESSMENT — ANXIETY QUESTIONNAIRES: GAD7 TOTAL SCORE: 8

## 2017-11-15 ASSESSMENT — PATIENT HEALTH QUESTIONNAIRE - PHQ9: SUM OF ALL RESPONSES TO PHQ QUESTIONS 1-9: 11

## 2017-11-24 ENCOUNTER — TELEPHONE (OUTPATIENT)
Dept: FAMILY MEDICINE | Facility: OTHER | Age: 57
End: 2017-11-24

## 2017-11-24 NOTE — LETTER
[facility]  [address] [address] [address]  [phone]          November 24, 2017    Sun De La Garza                                                                                                                     [address] [address] [address]            Dear Sun,  We received a notice that you are to be scheduled with a specialty clinic. The referral has been placed by your provider and you can call to schedule an appointment directly.     Enclosed, you will find the referral with the phone number to call to schedule an appointment.  If you have already scheduled this, you may disregard this letter.    Please call us if you have any questions or concerns.        Sincerely,         Preston Salcedo PA-C

## 2017-12-19 ENCOUNTER — TRANSFERRED RECORDS (OUTPATIENT)
Dept: HEALTH INFORMATION MANAGEMENT | Facility: CLINIC | Age: 57
End: 2017-12-19

## 2017-12-20 ENCOUNTER — TRANSFERRED RECORDS (OUTPATIENT)
Dept: HEALTH INFORMATION MANAGEMENT | Facility: CLINIC | Age: 57
End: 2017-12-20

## 2017-12-31 DIAGNOSIS — K57.30 DIVERTICULOSIS OF LARGE INTESTINE WITHOUT HEMORRHAGE: ICD-10-CM

## 2017-12-31 DIAGNOSIS — K58.2 IRRITABLE BOWEL SYNDROME WITH BOTH CONSTIPATION AND DIARRHEA: ICD-10-CM

## 2017-12-31 DIAGNOSIS — G89.29 CHRONIC ABDOMINAL PAIN: ICD-10-CM

## 2017-12-31 DIAGNOSIS — R10.9 CHRONIC ABDOMINAL PAIN: ICD-10-CM

## 2018-01-03 RX ORDER — DICYCLOMINE HYDROCHLORIDE 10 MG/1
CAPSULE ORAL
Qty: 240 CAPSULE | Refills: 0 | Status: SHIPPED | OUTPATIENT
Start: 2018-01-03 | End: 2021-08-31

## 2018-01-03 NOTE — TELEPHONE ENCOUNTER
Requested Prescriptions   Pending Prescriptions Disp Refills     dicyclomine (BENTYL) 10 MG capsule [Pharmacy Med Name: DICYCLOMINE HCL CAPS 10MG] 240 capsule 1     Sig: TAKE 1 CAPSULE FOUR TIMES A DAY BEFORE MEALS AND NIGHTLY    Oral Anticholinergic Agents Passed    12/31/2017  4:24 AM       Passed - Patient is of age 12 or older       Passed - Recent or future visit with authorizing provider's specialty    Patient had office visit in the last year or has a visit in the next 30 days with authorizing provider.  See chart review.              Passed - Patient is not pregnant       Passed - No positive pregnancy test on file within past 12 months        Per LOV 10/20/2017 to follow up in 6 months.     dicyclomine (BENTYL) 10 MG capsule  Prescription approved per [facility] Refill Protocol.  This should get the patient to roughly 04/2018 when they will be due for a follow up.    Maria Luisa Loving, RN, BSN

## 2018-03-28 NOTE — PATIENT INSTRUCTIONS
-Follow up for fasting labs. You can call our clinic to make an appointment to be get these done.       Preventive Health Recommendations  Female Ages 50 - 64    Yearly exam: See your health care provider every year in order to  o Review health changes.   o Discuss preventive care.    o Review your medicines if your doctor has prescribed any.      Get a Pap test every three years (unless you have an abnormal result and your provider advises testing more often).    If you get Pap tests with HPV test, you only need to test every 5 years, unless you have an abnormal result.     You do not need a Pap test if your uterus was removed (hysterectomy) and you have not had cancer.    You should be tested each year for STDs (sexually transmitted diseases) if you're at risk.     Have a mammogram every 1 to 2 years.    Have a colonoscopy at age 50, or have a yearly FIT test (stool test). These exams screen for colon cancer.      Have a cholesterol test every 5 years, or more often if advised.    Have a diabetes test (fasting glucose) every three years. If you are at risk for diabetes, you should have this test more often.     If you are at risk for osteoporosis (brittle bone disease), think about having a bone density scan (DEXA).    Shots: Get a flu shot each year. Get a tetanus shot every 10 years.    Nutrition:     Eat at least 5 servings of fruits and vegetables each day.    Eat whole-grain bread, whole-wheat pasta and brown rice instead of white grains and rice.    Talk to your provider about Calcium and Vitamin D.     Lifestyle    Exercise at least 150 minutes a week (30 minutes a day, 5 days a week). This will help you control your weight and prevent disease.    Limit alcohol to one drink per day.    No smoking.     Wear sunscreen to prevent skin cancer.     See your dentist every six months for an exam and cleaning.    See your eye doctor every 1 to 2 years.
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
## 2018-03-29 DIAGNOSIS — F41.8 DEPRESSION WITH ANXIETY: ICD-10-CM

## 2018-03-30 ENCOUNTER — OFFICE VISIT (OUTPATIENT)
Dept: FAMILY MEDICINE | Facility: OTHER | Age: 58
End: 2018-03-30
Payer: COMMERCIAL

## 2018-03-30 VITALS
WEIGHT: 169 LBS | SYSTOLIC BLOOD PRESSURE: 112 MMHG | HEART RATE: 70 BPM | HEIGHT: 64 IN | TEMPERATURE: 98.1 F | DIASTOLIC BLOOD PRESSURE: 64 MMHG | BODY MASS INDEX: 28.85 KG/M2 | OXYGEN SATURATION: 94 %

## 2018-03-30 DIAGNOSIS — Z13.220 SCREENING FOR HYPERLIPIDEMIA: ICD-10-CM

## 2018-03-30 DIAGNOSIS — F41.1 GAD (GENERALIZED ANXIETY DISORDER): ICD-10-CM

## 2018-03-30 DIAGNOSIS — K58.2 IRRITABLE BOWEL SYNDROME WITH BOTH CONSTIPATION AND DIARRHEA: ICD-10-CM

## 2018-03-30 DIAGNOSIS — F32.A DEPRESSIVE DISORDER: ICD-10-CM

## 2018-03-30 DIAGNOSIS — E55.9 VITAMIN D DEFICIENCY: ICD-10-CM

## 2018-03-30 DIAGNOSIS — K64.8 INTERNAL HEMORRHOIDS: ICD-10-CM

## 2018-03-30 DIAGNOSIS — Z00.00 ENCOUNTER FOR ROUTINE ADULT HEALTH EXAMINATION WITHOUT ABNORMAL FINDINGS: Primary | ICD-10-CM

## 2018-03-30 DIAGNOSIS — Z12.31 VISIT FOR SCREENING MAMMOGRAM: ICD-10-CM

## 2018-03-30 DIAGNOSIS — Z13.1 SCREENING FOR DIABETES MELLITUS: ICD-10-CM

## 2018-03-30 DIAGNOSIS — K64.4 EXTERNAL HEMORRHOIDS: ICD-10-CM

## 2018-03-30 DIAGNOSIS — G47.30 SLEEP APNEA, UNSPECIFIED TYPE: ICD-10-CM

## 2018-03-30 LAB
CHOLEST SERPL-MCNC: 167 MG/DL
GLUCOSE SERPL-MCNC: 82 MG/DL (ref 70–99)
HDLC SERPL-MCNC: 51 MG/DL
LDLC SERPL CALC-MCNC: 96 MG/DL
NONHDLC SERPL-MCNC: 116 MG/DL
TRIGL SERPL-MCNC: 100 MG/DL

## 2018-03-30 PROCEDURE — 99396 PREV VISIT EST AGE 40-64: CPT | Performed by: NURSE PRACTITIONER

## 2018-03-30 PROCEDURE — 36415 COLL VENOUS BLD VENIPUNCTURE: CPT | Performed by: NURSE PRACTITIONER

## 2018-03-30 PROCEDURE — 82306 VITAMIN D 25 HYDROXY: CPT | Performed by: NURSE PRACTITIONER

## 2018-03-30 PROCEDURE — 80061 LIPID PANEL: CPT | Performed by: NURSE PRACTITIONER

## 2018-03-30 PROCEDURE — 82947 ASSAY GLUCOSE BLOOD QUANT: CPT | Performed by: NURSE PRACTITIONER

## 2018-03-30 RX ORDER — VENLAFAXINE HYDROCHLORIDE 37.5 MG/1
37.5 CAPSULE, EXTENDED RELEASE ORAL DAILY
Qty: 90 CAPSULE | Refills: 1 | Status: CANCELLED | OUTPATIENT
Start: 2018-03-30

## 2018-03-30 RX ORDER — VENLAFAXINE HYDROCHLORIDE 37.5 MG/1
CAPSULE, EXTENDED RELEASE ORAL
Qty: 90 CAPSULE | Refills: 1 | OUTPATIENT
Start: 2018-03-30

## 2018-03-30 ASSESSMENT — ENCOUNTER SYMPTOMS
EYE PAIN: 0
FREQUENCY: 0
NERVOUS/ANXIOUS: 0
COUGH: 0
CHILLS: 0
DIARRHEA: 1
HEMATOCHEZIA: 0
ABDOMINAL PAIN: 0
CONSTIPATION: 0
DIZZINESS: 0
HEMATURIA: 0
FEVER: 0

## 2018-03-30 ASSESSMENT — ANXIETY QUESTIONNAIRES
GAD7 TOTAL SCORE: 6
2. NOT BEING ABLE TO STOP OR CONTROL WORRYING: NEARLY EVERY DAY
GAD7 TOTAL SCORE: 6
GAD7 TOTAL SCORE: 6
6. BECOMING EASILY ANNOYED OR IRRITABLE: NOT AT ALL
1. FEELING NERVOUS, ANXIOUS, OR ON EDGE: NOT AT ALL
5. BEING SO RESTLESS THAT IT IS HARD TO SIT STILL: NOT AT ALL
4. TROUBLE RELAXING: NOT AT ALL
3. WORRYING TOO MUCH ABOUT DIFFERENT THINGS: NEARLY EVERY DAY
7. FEELING AFRAID AS IF SOMETHING AWFUL MIGHT HAPPEN: NOT AT ALL
7. FEELING AFRAID AS IF SOMETHING AWFUL MIGHT HAPPEN: NOT AT ALL

## 2018-03-30 ASSESSMENT — PATIENT HEALTH QUESTIONNAIRE - PHQ9
SUM OF ALL RESPONSES TO PHQ QUESTIONS 1-9: 5
10. IF YOU CHECKED OFF ANY PROBLEMS, HOW DIFFICULT HAVE THESE PROBLEMS MADE IT FOR YOU TO DO YOUR WORK, TAKE CARE OF THINGS AT HOME, OR GET ALONG WITH OTHER PEOPLE: NOT DIFFICULT AT ALL
SUM OF ALL RESPONSES TO PHQ QUESTIONS 1-9: 5

## 2018-03-30 NOTE — MR AVS SNAPSHOT
After Visit Summary   3/30/2018    Sun De La Garza    MRN: [id number]           Patient Information     Date Of Birth          1960        Visit Information        Provider Department      3/30/2018 3:00 PM Veronica Cai APRN [facility]        Today's Diagnoses     Encounter for routine adult health examination without abnormal findings    -  1    Visit for screening mammogram        Sleep apnea, unspecified type        Irritable bowel syndrome with both constipation and diarrhea        MARIAN (generalized anxiety disorder)        Depressive disorder        Screening for hyperlipidemia        Screening for diabetes mellitus        Internal hemorrhoids        External hemorrhoids        Vitamin D deficiency          Care Instructions    -Follow up for fasting labs. You can call our clinic to make an appointment to be get these done.       Preventive Health Recommendations  Female Ages 50 - 64    Yearly exam: See your health care provider every year in order to  o Review health changes.   o Discuss preventive care.    o Review your medicines if your doctor has prescribed any.      Get a Pap test every three years (unless you have an abnormal result and your provider advises testing more often).    If you get Pap tests with HPV test, you only need to test every 5 years, unless you have an abnormal result.     You do not need a Pap test if your uterus was removed (hysterectomy) and you have not had cancer.    You should be tested each year for STDs (sexually transmitted diseases) if you're at risk.     Have a mammogram every 1 to 2 years.    Have a colonoscopy at age 50, or have a yearly FIT test (stool test). These exams screen for colon cancer.      Have a cholesterol test every 5 years, or more often if advised.    Have a diabetes test (fasting glucose) every three years. If you are at risk for diabetes, you should have this test more often.     If you are at risk for  osteoporosis (brittle bone disease), think about having a bone density scan (DEXA).    Shots: Get a flu shot each year. Get a tetanus shot every 10 years.    Nutrition:     Eat at least 5 servings of fruits and vegetables each day.    Eat whole-grain bread, whole-wheat pasta and brown rice instead of white grains and rice.    Talk to your provider about Calcium and Vitamin D.     Lifestyle    Exercise at least 150 minutes a week (30 minutes a day, 5 days a week). This will help you control your weight and prevent disease.    Limit alcohol to one drink per day.    No smoking.     Wear sunscreen to prevent skin cancer.     See your dentist every six months for an exam and cleaning.    See your eye doctor every 1 to 2 years.            Follow-ups after your visit        Additional Services     [facility] REFERRAL       Your provider has referred you to Outpatient [facility] Advance Care Planning Facilitator or Serious illness clinic support staff. The facilitator or support staff will contact you to schedule the appointment or for the follow up call    Reason for Referral: Packet given.                  Follow-up notes from your care team     Return in about 1 year (around 3/30/2019).      Future tests that were ordered for you today     Open Future Orders        Priority Expected Expires Ordered    MA SCREENING DIGITAL BILAT - Future  (s+30) Routine  3/28/2019 3/30/2018            Who to contact     If you have questions or need follow up information about today's clinic visit or your schedule please contact [facility] directly at [phone].  Normal or non-critical lab and imaging results will be communicated to you by MyChart, letter or phone within 4 business days after the clinic has received the results. If you do not hear from us within 7 days, please contact the clinic through MyChart or phone. If you have a critical or abnormal lab result, we will notify you by phone as soon as  "possible.  Submit refill requests through [employer] or call your pharmacy and they will forward the refill request to us. Please allow 3 business days for your refill to be completed.          Additional Information About Your Visit        [employer] Information     [employer] gives you secure access to your electronic health record. If you see a primary care provider, you can also send messages to your care team and make appointments. If you have questions, please call your primary care clinic.  If you do not have a primary care provider, please call [phone] and they will assist you.        Care EveryWhere ID     This is your Care EveryWhere ID. This could be used by other organizations to access your [facility] medical records  [id number]        Your Vitals Were     Pulse Temperature Height Pulse Oximetry Breastfeeding? BMI (Body Mass Index)    70 98.1  F (36.7  C) (Temporal) 5' 3.5\" (1.613 m) 94% No 29.47 kg/m2       Blood Pressure from Last 3 Encounters:   03/30/18 112/64   11/14/17 124/70   10/20/17 108/64    Weight from Last 3 Encounters:   03/30/18 169 lb (76.7 kg)   11/14/17 175 lb (79.4 kg)   10/20/17 176 lb 1.6 oz (79.9 kg)              We Performed the Following     25 Hydroxyvitamin D2 and D3     Glucose     HONORING CHOICES REFERRAL     Lipid Profile (Chol, Trig, HDL, LDL calc)          Today's Medication Changes          These changes are accurate as of 3/30/18  4:05 PM.  If you have any questions, ask your nurse or doctor.               Start taking these medicines.        Dose/Directions    hydrocortisone 2.5 % cream   Commonly known as:  ANUSOL-HC   Used for:  External hemorrhoids, Internal hemorrhoids   Started by:  Veronica Cai APRN CNP        Place rectally 2 times daily   Quantity:  30 g   Refills:  0            Where to get your medicines      These medications were sent to [facility] [place], [place] - [address]  [address], [facility] [address]     Phone:  " [phone]     hydrocortisone 2.5 % cream                Primary Care Provider Office Phone # Fax #    Denise Schulte PA-C [phone] [address] [place] [address]        Equal Access to Services     HEIDY CANALES : Jackson susanna fajardo nehao Soomaali, waaxda luqadaha, qaybta kaalmada adejeanneyada, roxana arriagaelkin melissa. So [facility] [phone].    ATENCIÓN: Si habla español, tiene a conrad disposición servicios gratuitos de asistencia lingüística. Llame al [phone].    We comply with applicable federal civil rights laws and [place] laws. We do not discriminate on the basis of race, color, national origin, age, disability, sex, sexual orientation, or gender identity.            Thank you!     Thank you for choosing [facility]  for your care. Our goal is always to provide you with excellent care. Hearing back from our patients is one way we can continue to improve our services. Please take a few minutes to complete the written survey that you may receive in the mail after your visit with us. Thank you!             Your Updated Medication List - Protect others around you: Learn how to safely use, store and throw away your medicines at www.disposemymeds.org.          This list is accurate as of 3/30/18  4:05 PM.  Always use your most recent med list.                   Brand Name Dispense Instructions for use Diagnosis    BABY ASPIRIN 81 MG chewable tablet   Generic drug:  aspirin      Take 81 mg by mouth daily.        dicyclomine 10 MG capsule    BENTYL    240 capsule    TAKE 1 CAPSULE FOUR TIMES A DAY BEFORE MEALS AND NIGHTLY    Chronic abdominal pain, Diverticulosis of large intestine without hemorrhage, Irritable bowel syndrome with both constipation and diarrhea       hydrocortisone 2.5 % cream    ANUSOL-HC    30 g    Place rectally 2 times daily    External hemorrhoids, Internal hemorrhoids       omeprazole 40 MG capsule    priLOSEC    90 capsule    Take 1  capsule (40 mg) by mouth daily Take 30-60 minutes before a meal.    Gastroesophageal reflux disease with esophagitis       PROBIOTIC & ACIDOPHILUS EX ST PO           venlafaxine 37.5 MG 24 hr capsule    EFFEXOR-XR    90 capsule    Take 1 capsule (37.5 mg) by mouth daily    Depression with anxiety       vitamin B complex with vitamin C Tabs tablet      Take 1 tablet by mouth daily        VITAMIN D (CHOLECALCIFEROL) PO      Take by mouth daily

## 2018-03-30 NOTE — LETTER
My Depression Action Plan  Name: Sun De La Garza   Date of Birth 1960  Date: 3/28/2018    My doctor: Denise Schulte   My clinic: [address]  [facility] [id number] [phone]          GREEN    ZONE   Good Control    What it looks like:     Things are going generally well. You have normal up s and down s. You may even feel depressed from time to time, but bad moods usually last less than a day.   What you need to do:  1. Continue to care for yourself (see self care plan)  2. Check your depression survival kit and update it as needed  3. Follow your physician s recommendations including any medication.  4. Do not stop taking medication unless you consult with your physician first.           YELLOW         ZONE Getting Worse    What it looks like:     Depression is starting to interfere with your life.     It may be hard to get out of bed; you may be starting to isolate yourself from others.    Symptoms of depression are starting to last most all day and this has happened for several days.     You may have suicidal thoughts but they are not constant.   What you need to do:     1. Call your care team, your response to treatment will improve if you keep your care team informed of your progress. Yellow periods are signs an adjustment may need to be made.     2. Continue your self-care, even if you have to fake it!    3. Talk to someone in your support network    4. Open up your depression survival kit           RED    ZONE Medical Alert - Get Help    What it looks like:     Depression is seriously interfering with your life.     You may experience these or other symptoms: You can t get out of bed most days, can t work or engage in other necessary activities, you have trouble taking care of basic hygiene, or basic responsibilities, thoughts of suicide or death that will not go away, self-injurious behavior.     What you need to do:  1. Call your care team and  request a same-day appointment. If they are not available (weekends or after hours) call your local crisis line, emergency room or 911.            Depression Self Care Plan / Survival Kit    Self-Care for Depression  Here s the deal. Your body and mind are really not as separate as most people think.  What you do and think affects how you feel and how you feel influences what you do and think. This means if you do things that people who feel good do, it will help you feel better.  Sometimes this is all it takes.  There is also a place for medication and therapy depending on how severe your depression is, so be sure to consult with your medical provider and/ or Behavioral Health Consultant if your symptoms are worsening or not improving.     In order to better manage my stress, I will:    Exercise  Get some form of exercise, every day. This will help reduce pain and release endorphins, the  feel good  chemicals in your brain. This is almost as good as taking antidepressants!  This is not the same as joining a gym and then never going! (they count on that by the way ) It can be as simple as just going for a walk or doing some gardening, anything that will get you moving.      Hygiene   Maintain good hygiene (Get out of bed in the morning, Make your bed, Brush your teeth, Take a shower, and Get dressed like you were going to work, even if you are unemployed).  If your clothes don't fit try to get ones that do.    Diet  I will strive to eat foods that are good for me, drink plenty of water, and avoid excessive sugar, caffeine, alcohol, and other mood-altering substances.  Some foods that are helpful in depression are: complex carbohydrates, B vitamins, flaxseed, fish or fish oil, fresh fruits and vegetables.    Psychotherapy  I agree to participate in Individual Therapy (if recommended).    Medication  If prescribed medications, I agree to take them.  Missing doses can result in serious side effects.  I understand that  drinking alcohol, or other illicit drug use, may cause potential side effects.  I will not stop my medication abruptly without first discussing it with my provider.    Staying Connected With Others  I will stay in touch with my friends, family members, and my primary care provider/team.    Use your imagination  Be creative.  We all have a creative side; it doesn t matter if it s oil painting, sand castles, or mud pies! This will also kick up the endorphins.    Witness Beauty  (AKA stop and smell the roses) Take a look outside, even in mid-winter. Notice colors, textures. Watch the squirrels and birds.     Service to others  Be of service to others.  There is always someone else in need.  By helping others we can  get out of ourselves  and remember the really important things.  This also provides opportunities for practicing all the other parts of the program.    Humor  Laugh and be silly!  Adjust your TV habits for less news and crime-drama and more comedy.    Control your stress  Try breathing deep, massage therapy, biofeedback, and meditation. Find time to relax each day.     My support system    Clinic Contact:  Phone number:    Contact 1:  Phone number:    Contact 2:  Phone number:    Denominational/:  Phone number:    Therapist:  Phone number:    Local crisis center:    Phone number:    Other community support:  Phone number:

## 2018-03-31 ENCOUNTER — HEALTH MAINTENANCE LETTER (OUTPATIENT)
Age: 58
End: 2018-03-31

## 2018-03-31 ASSESSMENT — PATIENT HEALTH QUESTIONNAIRE - PHQ9: SUM OF ALL RESPONSES TO PHQ QUESTIONS 1-9: 5

## 2018-03-31 ASSESSMENT — ANXIETY QUESTIONNAIRES: GAD7 TOTAL SCORE: 6

## 2018-04-02 ENCOUNTER — TELEPHONE (OUTPATIENT)
Dept: FAMILY MEDICINE | Facility: OTHER | Age: 58
End: 2018-04-02

## 2018-04-02 NOTE — TELEPHONE ENCOUNTER
Placed form at  for  by pt, there are two highlighted areas that the pt must sign before turning in.     Left detailed voicemail for pt with this information

## 2018-04-02 NOTE — TELEPHONE ENCOUNTER
Please let patient know her cholesterol and glucose look great. No concerns. Recheck in 1 year.    I filled out her forms for work. Please let me know we will fax them.      MA: In task basket please fax.     LEN Baptiste CNP

## 2018-04-03 ENCOUNTER — TELEPHONE (OUTPATIENT)
Dept: FAMILY MEDICINE | Facility: OTHER | Age: 58
End: 2018-04-03

## 2018-04-03 LAB
DEPRECATED CALCIDIOL+CALCIFEROL SERPL-MC: <57 UG/L (ref 20–75)
VITAMIN D2 SERPL-MCNC: <5 UG/L
VITAMIN D3 SERPL-MCNC: 52 UG/L

## 2018-04-03 NOTE — LETTER
April 4, 2018      Sun De La Garza  [address] [address] [facility] [address]        Dear Sun,     This letter is to inform you that your recent labs were normal.   Results for orders placed or performed in visit on 03/30/18   Lipid Profile (Chol, Trig, HDL, LDL calc)   Result Value Ref Range    Cholesterol 167 <200 mg/dL    Triglycerides 100 <150 mg/dL    HDL Cholesterol 51 >49 mg/dL    LDL Cholesterol Calculated 96 <100 mg/dL    Non HDL Cholesterol 116 <130 mg/dL   Glucose   Result Value Ref Range    Glucose 82 70 - 99 mg/dL   25 Hydroxyvitamin D2 and D3   Result Value Ref Range    25 OH Vit D2 <5 ug/L    25 OH Vit D3 52 ug/L    25 OH Vit D total <57 20 - 75 ug/L     If you have any further questions or concerns, please call the clinic at [phone].    Thank you,  [facility] Team

## 2018-04-03 NOTE — TELEPHONE ENCOUNTER
----- Message from LEN Boykin CNP sent at 4/3/2018  4:07 PM CDT -----  Vitamin D normal.   LEN Baptiste CNP

## 2018-04-19 ENCOUNTER — OFFICE VISIT (OUTPATIENT)
Dept: FAMILY MEDICINE | Facility: OTHER | Age: 58
End: 2018-04-19
Payer: COMMERCIAL

## 2018-04-19 ENCOUNTER — TELEPHONE (OUTPATIENT)
Dept: FAMILY MEDICINE | Facility: OTHER | Age: 58
End: 2018-04-19

## 2018-04-19 VITALS
TEMPERATURE: 97.3 F | HEIGHT: 64 IN | RESPIRATION RATE: 16 BRPM | HEART RATE: 64 BPM | WEIGHT: 169.9 LBS | SYSTOLIC BLOOD PRESSURE: 114 MMHG | DIASTOLIC BLOOD PRESSURE: 70 MMHG | BODY MASS INDEX: 29.01 KG/M2

## 2018-04-19 DIAGNOSIS — Z12.31 VISIT FOR SCREENING MAMMOGRAM: Primary | ICD-10-CM

## 2018-04-19 DIAGNOSIS — K58.2 IRRITABLE BOWEL SYNDROME WITH BOTH CONSTIPATION AND DIARRHEA: ICD-10-CM

## 2018-04-19 DIAGNOSIS — Z02.89 ENCOUNTER FOR COMPLETION OF FORM WITH PATIENT: ICD-10-CM

## 2018-04-19 DIAGNOSIS — F41.8 DEPRESSION WITH ANXIETY: ICD-10-CM

## 2018-04-19 PROCEDURE — 99214 OFFICE O/P EST MOD 30 MIN: CPT | Performed by: PHYSICIAN ASSISTANT

## 2018-04-19 RX ORDER — VENLAFAXINE HYDROCHLORIDE 37.5 MG/1
75 CAPSULE, EXTENDED RELEASE ORAL DAILY
Qty: 180 CAPSULE | Refills: 1 | Status: SHIPPED | OUTPATIENT
Start: 2018-04-19 | End: 2018-11-01

## 2018-04-19 ASSESSMENT — PATIENT HEALTH QUESTIONNAIRE - PHQ9
SUM OF ALL RESPONSES TO PHQ QUESTIONS 1-9: 8
10. IF YOU CHECKED OFF ANY PROBLEMS, HOW DIFFICULT HAVE THESE PROBLEMS MADE IT FOR YOU TO DO YOUR WORK, TAKE CARE OF THINGS AT HOME, OR GET ALONG WITH OTHER PEOPLE: SOMEWHAT DIFFICULT
SUM OF ALL RESPONSES TO PHQ QUESTIONS 1-9: 8

## 2018-04-19 ASSESSMENT — PAIN SCALES - GENERAL: PAINLEVEL: NO PAIN (0)

## 2018-04-19 NOTE — PROGRESS NOTES
SUBJECTIVE:   Sun De La Garza is a 57 year old female who presents to clinic today for the following health issues:    The 10-year ASCVD risk score (Arunsera MURRAY Jr, et al., 2013) is: 1.4%    Values used to calculate the score:      Age: 57 years      Sex: Female      Is Non- : No      Diabetic: No      Tobacco smoker: No      Systolic Blood Pressure: 112 mmHg      Is BP treated: No      HDL Cholesterol: 51 mg/dL      Total Cholesterol: 167 mg/dL  Patient is eligible for use of low-dose aspirin for primary prevention of heart attack and stroke.  Provider has discussed aspirin with patient and our decision was:     Prescribe:  Daily low-dose aspirin recommended for primary prevention, patient agrees with plan.        History of Present Illness     Depression & Anxiety Follow-up:     Depression/Anxiety:  Depression only    Status since last visit::  Improved    Other associated symptoms of depression::  None    Significant life event::  No    Current substance use::  None    Anxiety/Panic symptoms::  No       Today's PHQ-9         PHQ-9 Total Score:         PHQ-9 Q9 Suicidal ideation:       Thoughts of suicide or self harm:      Self-harm Plan:        Self-harm Action:          Safety concerns for self or others:          Depression & Anxiety Follow-up comment:  She is found Effexor to be very helpful for her.  Previously she was using Zoloft but it was not as helpful.  She wants more energy so she is started to exercise again in hopes this would help.  Wonders about a higher dosage    Diet:  Regular (no restrictions)  Frequency of exercise:  4-5 days/week  Duration of exercise:  15-30 minutes  Taking medications regularly:  Yes  Medication side effects:  None  Additional concerns today:  No      Problem list and histories reviewed & adjusted, as indicated.  Additional history: as documented        Advance Care Planning 4/19/2018:     She is here to have [facility] paperwork completed, she has been on  "FMLA for approximately 5 years for her irritable bowel syndrome.  Having alternating diarrhea and constipation exhaust patient.  When she has a flare over the last several days.  It is not always clear what causes her diarrhea or constipation.  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  Dr. Nash has completed the form for her in the past    BP Readings from Last 3 Encounters:   04/19/18 114/70   03/30/18 112/64   11/14/17 124/70    Wt Readings from Last 3 Encounters:   04/19/18 169 lb 14.4 oz (77.1 kg)   03/30/18 169 lb (76.7 kg)   11/14/17 175 lb (79.4 kg)                  Labs reviewed in EPIC    ROS:  CONSTITUTIONAL: NEGATIVE for fever, chills, change in weight  ENT/MOUTH: NEGATIVE for ear, mouth and throat problems  RESP: NEGATIVE for significant cough or SOB  CV: NEGATIVE for chest pain, palpitations or peripheral edema  GI: Irritable bowel syndrome as above  PSYCHIATRIC: See PHQ 9 and MARIAN 7 questionnaires for symptoms.     OBJECTIVE:     /70 (BP Location: Left arm, Patient Position: Chair, Cuff Size: Adult Regular)  Pulse 64  Temp 97.3  F (36.3  C) (Temporal)  Resp 16  Ht 5' 3.5\" (1.613 m)  Wt 169 lb 14.4 oz (77.1 kg)  BMI 29.62 kg/m2  Body mass index is 29.62 kg/(m^2).  GENERAL: healthy, alert and no distress  NECK: no adenopathy, no asymmetry, masses, or scars and thyroid normal to palpation  RESP: lungs clear to auscultation - no rales, rhonchi or wheezes  CV: regular rate and rhythm, normal S1 S2, no S3 or S4, no murmur, click or rub, no peripheral edema and peripheral pulses strong  ABDOMEN: soft, nontender, no hepatosplenomegaly, no masses and bowel sounds normal  MS: no gross musculoskeletal defects noted, no edema  PSYCH: mentation appears normal, affect normal/bright    Diagnostic Test Results:  Results for orders placed or performed in visit on 03/30/18   Lipid Profile (Chol, Trig, HDL, LDL calc)   Result Value Ref Range    Cholesterol 167 <200 mg/dL    " Triglycerides 100 <150 mg/dL    HDL Cholesterol 51 >49 mg/dL    LDL Cholesterol Calculated 96 <100 mg/dL    Non HDL Cholesterol 116 <130 mg/dL   Glucose   Result Value Ref Range    Glucose 82 70 - 99 mg/dL   25 Hydroxyvitamin D2 and D3   Result Value Ref Range    25 OH Vit D2 <5 ug/L    25 OH Vit D3 52 ug/L    25 OH Vit D total <57 20 - 75 ug/L       ASSESSMENT/PLAN:         1. Depression with anxiety  Increased dosage, if she is doing well we will recheck 6 months if not we will do a recheck in 1 month on the phone or in person  - venlafaxine (EFFEXOR-XR) 37.5 MG 24 hr capsule; Take 2 capsules (75 mg) by mouth daily  Dispense: 180 capsule; Refill: 1  - aspirin 81 MG tablet; Take 1 tablet (81 mg) by mouth daily  Dispense: 30 tablet    2. Irritable bowel syndrome with both constipation and diarrhea  Continue with current treatment FMLA paperwork will be completed and returned to patient  - aspirin 81 MG tablet; Take 1 tablet (81 mg) by mouth daily  Dispense: 30 tablet    3. Encounter for completion of form with patient  Will be completed, fax and mail back to patient    4. Visit for screening mammogram  Patient will call to schedule      This chart documentation was completed in part with Dragon voice recognition software.  Documentation is reviewed after completion, however, some words and grammatical errors may remain.  JESU Jackson PA-C  [facility]  Electronically signed by Denise Schulte PA-C

## 2018-04-19 NOTE — TELEPHONE ENCOUNTER
Form has been faxed, sent to scanning and copy placed in Denise Schulte's  fax folder and also a copy has been mailed to the patient  Closing encounter    Ivory MEYERS (R)

## 2018-04-19 NOTE — MR AVS SNAPSHOT
After Visit Summary   4/19/2018    Sun De La Garza    MRN: [id number]           Patient Information     Date Of Birth          1960        Visit Information        Provider Department      4/19/2018 12:15 PM Denise Schulte PA-C [employer]        Today's Diagnoses     Visit for screening mammogram    -  1    Depression with anxiety        Irritable bowel syndrome with both constipation and diarrhea        Encounter for completion of form with patient           Follow-ups after your visit        Who to contact     If you have questions or need follow up information about today's clinic visit or your schedule please contact [facility] directly at [phone].  Normal or non-critical lab and imaging results will be communicated to you by MyChart, letter or phone within 4 business days after the clinic has received the results. If you do not hear from us within 7 days, please contact the clinic through StemCytehart or phone. If you have a critical or abnormal lab result, we will notify you by phone as soon as possible.  Submit refill requests through EqsQuest or call your pharmacy and they will forward the refill request to us. Please allow 3 business days for your refill to be completed.          Additional Information About Your Visit        MyChart Information     EqsQuest gives you secure access to your electronic health record. If you see a primary care provider, you can also send messages to your care team and make appointments. If you have questions, please call your primary care clinic.  If you do not have a primary care provider, please call [phone] and they will assist you.        Care EveryWhere ID     This is your Care EveryWhere ID. This could be used by other organizations to access your [place] medical records  [id number]        Your Vitals Were     Pulse Temperature Respirations Height BMI (Body Mass Index)       64 97.3  F (36.3  C) (Temporal) 16 5'  "3.5\" (1.613 m) 29.62 kg/m2        Blood Pressure from Last 3 Encounters:   04/19/18 114/70   03/30/18 112/64   11/14/17 124/70    Weight from Last 3 Encounters:   04/19/18 169 lb 14.4 oz (77.1 kg)   03/30/18 169 lb (76.7 kg)   11/14/17 175 lb (79.4 kg)              Today, you had the following     No orders found for display         Today's Medication Changes          These changes are accurate as of 4/19/18 12:41 PM.  If you have any questions, ask your nurse or doctor.               These medicines have changed or have updated prescriptions.        Dose/Directions    venlafaxine 37.5 MG 24 hr capsule   Commonly known as:  EFFEXOR-XR   This may have changed:  how much to take   Used for:  Depression with anxiety   Changed by:  Denise Schulte PA-C        Dose:  75 mg   Take 2 capsules (75 mg) by mouth daily   Quantity:  180 capsule   Refills:  1            Where to get your medicines      These medications were sent to [facility] [address] [address]     Phone:  [phone]     venlafaxine 37.5 MG 24 hr capsule                Primary Care Provider Office Phone # Fax #    Denies Schulte PA-C [phone] [address] [place] [address]        Equal Access to Services     [facility]: Hadii susanna fajardo hadasho Soomaali, waaxda luqadaha, qaybta kaalmada adejeanneyada, roxana quintero . So [facility] [phone].    ATENCIÓN: Si habla español, tiene a conrad disposición servicios gratuitos de asistencia lingüística. Llame al [phone].    We comply with applicable federal civil rights laws and [place] laws. We do not discriminate on the basis of race, color, national origin, age, disability, sex, sexual orientation, or gender identity.            Thank you!     Thank you for choosing [facility] [place] [facility]  for your care. Our goal is always to provide you with excellent care. Hearing back from " our patients is one way we can continue to improve our services. Please take a few minutes to complete the written survey that you may receive in the mail after your visit with us. Thank you!             Your Updated Medication List - Protect others around you: Learn how to safely use, store and throw away your medicines at www.disposemymeds.org.          This list is accurate as of 4/19/18 12:41 PM.  Always use your most recent med list.                   Brand Name Dispense Instructions for use Diagnosis    BABY ASPIRIN 81 MG chewable tablet   Generic drug:  aspirin      Take 81 mg by mouth daily.        dicyclomine 10 MG capsule    BENTYL    240 capsule    TAKE 1 CAPSULE FOUR TIMES A DAY BEFORE MEALS AND NIGHTLY    Chronic abdominal pain, Diverticulosis of large intestine without hemorrhage, Irritable bowel syndrome with both constipation and diarrhea       hydrocortisone 2.5 % cream    ANUSOL-HC    30 g    Place rectally 2 times daily    External hemorrhoids, Internal hemorrhoids       omeprazole 40 MG capsule    priLOSEC    90 capsule    Take 1 capsule (40 mg) by mouth daily Take 30-60 minutes before a meal.    Gastroesophageal reflux disease with esophagitis       PROBIOTIC & ACIDOPHILUS EX ST PO           venlafaxine 37.5 MG 24 hr capsule    EFFEXOR-XR    180 capsule    Take 2 capsules (75 mg) by mouth daily    Depression with anxiety       vitamin B complex with vitamin C Tabs tablet      Take 1 tablet by mouth daily        VITAMIN D (CHOLECALCIFEROL) PO      Take by mouth daily

## 2018-04-19 NOTE — NURSING NOTE
"Chief Complaint   Patient presents with     Depression     Anxiety       Initial /70 (BP Location: Left arm, Patient Position: Chair, Cuff Size: Adult Regular)  Pulse 64  Temp 97.3  F (36.3  C) (Temporal)  Resp 16  Ht 5' 3.5\" (1.613 m)  Wt 169 lb 14.4 oz (77.1 kg)  BMI 29.62 kg/m2 Estimated body mass index is 29.62 kg/(m^2) as calculated from the following:    Height as of this encounter: 5' 3.5\" (1.613 m).    Weight as of this encounter: 169 lb 14.4 oz (77.1 kg).  Medication Reconciliation: complete  Tegan Duncan CMA    "

## 2018-04-20 ASSESSMENT — PATIENT HEALTH QUESTIONNAIRE - PHQ9: SUM OF ALL RESPONSES TO PHQ QUESTIONS 1-9: 8

## 2018-04-25 ENCOUNTER — HOSPITAL ENCOUNTER (OUTPATIENT)
Dept: MAMMOGRAPHY | Facility: CLINIC | Age: 58
Discharge: HOME | End: 2018-04-25
Attending: PHYSICIAN ASSISTANT | Admitting: PHYSICIAN ASSISTANT
Payer: COMMERCIAL

## 2018-04-25 DIAGNOSIS — Z12.31 VISIT FOR SCREENING MAMMOGRAM: ICD-10-CM

## 2018-04-25 PROCEDURE — 77067 SCR MAMMO BI INCL CAD: CPT

## 2018-10-29 NOTE — PROGRESS NOTES
SUBJECTIVE:   Sun De La Garza is a 58 year old female who presents to clinic today for the following health issues:  Discuss dicyclomine-pt taking herself off of this and has not taken in a while, also review probiotic with her as she has also not taken this for awhile patient has a diagnosis of irritable bowel syndrome.  She has noticed since changing to the keto diet her stools are a bit firmer.  She never found the diagnosis psych limiting to be very helpful.    History of Present Illness     Depression & Anxiety Follow-up:     Depression/Anxiety:  Depression only    Status since last visit::  Worsened (She has had increased stress in her family.  1 of her daughters is getting a divorce and she worries about her grandchildren.)    Other associated symptoms of depression::  YES    Significant life event::  YES    Current substance use::  Alcohol, Bath Salts and Prescription Drugs    Anxiety/Panic symptoms::  No       Today's PHQ-9         PHQ-9 Total Score:     6   PHQ-9 Q9 Suicidal ideation:   Not at all   Thoughts of suicide or self harm:      Self-harm Plan:        Self-harm Action:          Safety concerns for self or others:       MARIAN-7 Total Score: 7  Depression & Anxiety Follow-up comment:  Overall she feels that she is stable, she would like to continue current her current meds as they had been working quite well.  Does report because of her increased stress she seems to be having more vivid nightmares, with 1 of her nightmares, she fell out of bed hitting her right chest wall on a laptop as she fell.  That occurred about 4 days ago and she still is having some pain in her right chest wall that she would like me to check.  She has pain with deep inspiration but feels improved overall    Diet:  Regular (no restrictions)  Frequency of exercise:  4-5 days/week  Duration of exercise:  30-45 minutes  Taking medications regularly:  Yes  Medication side effects:  None  Additional concerns today:   "Yes    Musculoskeletal problem/pain      Duration: about a month, that she has noticed any way.  Has gotten larger in size    Description  Location: below right knee    Intensity:  Mild and only when kneeling     Accompanying signs and symptoms: none    History  Previous similar problem: YES- on her scalp and Dr Nash \"popped it\"  Previous evaluation:  none    Precipitating or alleviating factors:  Trauma or overuse: no   Aggravating factors include: kneeling    Therapies tried and outcome: ice    Problem list and histories reviewed & adjusted, as indicated.  Additional history: She needs her [facility] paperwork completed again today.        BP Readings from Last 3 Encounters:   11/01/18 124/70   04/19/18 114/70   03/30/18 112/64    Wt Readings from Last 3 Encounters:   11/01/18 157 lb (71.2 kg)   04/19/18 169 lb 14.4 oz (77.1 kg)   03/30/18 169 lb (76.7 kg)                  Labs reviewed in EPIC    ROS:  CONSTITUTIONAL: NEGATIVE for fever, chills, change in weight  ENT/MOUTH: NEGATIVE for ear, mouth and throat problems  RESP: NEGATIVE for significant cough or SOB  CV: NEGATIVE for chest pain, palpitations or peripheral edema  GI: History of IBS  MUSCULOSKELETAL: Just under her right knee  PSYCHIATRIC: See PHQ 9 and MARIAN 7 questionnaires for symptoms.     OBJECTIVE:     /70  Pulse 62  Temp 98.9  F (37.2  C) (Temporal)  Resp 12  Wt 157 lb (71.2 kg)  BMI 27.38 kg/m2  Body mass index is 27.38 kg/(m^2).  GENERAL: healthy, alert and no distress  NECK: no adenopathy, no asymmetry, masses, or scars and thyroid normal to palpation  RESP: lungs clear to auscultation - no rales, rhonchi or wheezes  CV: regular rate and rhythm, normal S1 S2, no S3 or S4, no murmur, click or rub, no peripheral edema and peripheral pulses strong  ABDOMEN: soft, nontender, no hepatosplenomegaly, no masses and bowel sounds normal  MS: 1 cm round mobile mass overlying her right tibial tuberosity overlying skin is normal  PSYCH: mentation " appears normal, affect normal/bright    Diagnostic Test Results:  none     ASSESSMENT/PLAN:         1. Depression with anxiety  Offered counseling, for now she will continue her current meds and follow-up with worsening  - venlafaxine (EFFEXOR-XR) 37.5 MG 24 hr capsule; Take 2 capsules (75 mg) by mouth daily  Dispense: 180 capsule; Refill: 1    2. Gastroesophageal reflux disease with esophagitis  Stable well-controlled  - omeprazole (PRILOSEC) 40 MG capsule; Take 1 capsule (40 mg) by mouth daily Take 30-60 minutes before a meal.  Dispense: 90 capsule; Refill: 3    3. Irritable bowel syndrome with both constipation and diarrhea  We will complete her FMLA paperwork patient would like a copy mailed to her, she did not notice improvement with dicyclomine she may continue to hold off on this though since her stools are getting firmer, I recommend increase fluids specifically water and fiber    4. Screening for malignant neoplasm of cervix  She will make a follow-up appointment for her physical    5. Screening for HIV (human immunodeficiency virus)  Addressed at PE    6. Need for prophylactic vaccination and inoculation against influenza  She has already had her flu shot    7. Sebaceous cyst    - ORTHO  REFERRAL    This chart documentation was completed in part with Dragon voice recognition software.  Documentation is reviewed after completion, however, some words and grammatical errors may remain.  JESU Jackson PA-C  [facility] [place]  Answers for HPI/ROS submitted by the patient on 11/1/2018   PHQ-2 Score: 2  PHQ9 TOTAL SCORE: Incomplete  Electronically signed by Denise Schulte PA-C

## 2018-11-01 ENCOUNTER — OFFICE VISIT (OUTPATIENT)
Dept: FAMILY MEDICINE | Facility: OTHER | Age: 58
End: 2018-11-01
Payer: COMMERCIAL

## 2018-11-01 VITALS
RESPIRATION RATE: 12 BRPM | BODY MASS INDEX: 27.38 KG/M2 | SYSTOLIC BLOOD PRESSURE: 124 MMHG | HEART RATE: 62 BPM | WEIGHT: 157 LBS | DIASTOLIC BLOOD PRESSURE: 70 MMHG | TEMPERATURE: 98.9 F

## 2018-11-01 DIAGNOSIS — Z12.4 SCREENING FOR MALIGNANT NEOPLASM OF CERVIX: ICD-10-CM

## 2018-11-01 DIAGNOSIS — K58.2 IRRITABLE BOWEL SYNDROME WITH BOTH CONSTIPATION AND DIARRHEA: Primary | ICD-10-CM

## 2018-11-01 DIAGNOSIS — F41.8 DEPRESSION WITH ANXIETY: ICD-10-CM

## 2018-11-01 DIAGNOSIS — Z23 NEED FOR PROPHYLACTIC VACCINATION AND INOCULATION AGAINST INFLUENZA: ICD-10-CM

## 2018-11-01 DIAGNOSIS — K21.00 GASTROESOPHAGEAL REFLUX DISEASE WITH ESOPHAGITIS: ICD-10-CM

## 2018-11-01 DIAGNOSIS — Z11.4 SCREENING FOR HIV (HUMAN IMMUNODEFICIENCY VIRUS): ICD-10-CM

## 2018-11-01 DIAGNOSIS — L72.3 SEBACEOUS CYST: ICD-10-CM

## 2018-11-01 PROCEDURE — 99214 OFFICE O/P EST MOD 30 MIN: CPT | Performed by: PHYSICIAN ASSISTANT

## 2018-11-01 RX ORDER — OMEPRAZOLE 40 MG/1
40 CAPSULE, DELAYED RELEASE ORAL DAILY
Qty: 90 CAPSULE | Refills: 3 | Status: SHIPPED | OUTPATIENT
Start: 2018-11-01 | End: 2020-04-20

## 2018-11-01 RX ORDER — VENLAFAXINE HYDROCHLORIDE 37.5 MG/1
75 CAPSULE, EXTENDED RELEASE ORAL DAILY
Qty: 180 CAPSULE | Refills: 1 | Status: SHIPPED | OUTPATIENT
Start: 2018-11-01 | End: 2019-04-03

## 2018-11-01 ASSESSMENT — ANXIETY QUESTIONNAIRES
IF YOU CHECKED OFF ANY PROBLEMS ON THIS QUESTIONNAIRE, HOW DIFFICULT HAVE THESE PROBLEMS MADE IT FOR YOU TO DO YOUR WORK, TAKE CARE OF THINGS AT HOME, OR GET ALONG WITH OTHER PEOPLE: SOMEWHAT DIFFICULT
2. NOT BEING ABLE TO STOP OR CONTROL WORRYING: NEARLY EVERY DAY
3. WORRYING TOO MUCH ABOUT DIFFERENT THINGS: NEARLY EVERY DAY
1. FEELING NERVOUS, ANXIOUS, OR ON EDGE: NOT AT ALL
GAD7 TOTAL SCORE: 7
7. FEELING AFRAID AS IF SOMETHING AWFUL MIGHT HAPPEN: NOT AT ALL
5. BEING SO RESTLESS THAT IT IS HARD TO SIT STILL: NOT AT ALL
6. BECOMING EASILY ANNOYED OR IRRITABLE: NOT AT ALL

## 2018-11-01 ASSESSMENT — PATIENT HEALTH QUESTIONNAIRE - PHQ9
5. POOR APPETITE OR OVEREATING: SEVERAL DAYS
SUM OF ALL RESPONSES TO PHQ QUESTIONS 1-9: 6

## 2018-11-01 ASSESSMENT — PAIN SCALES - GENERAL: PAINLEVEL: EXTREME PAIN (8)

## 2018-11-01 NOTE — MR AVS SNAPSHOT
After Visit Summary   11/1/2018    Sun De La Garza    MRN: [id number]           Patient Information     Date Of Birth          1960        Visit Information        Provider Department      11/1/2018 8:00 AM Denise Schulte PA-C [facility]        Today's Diagnoses     Depression with anxiety        Gastroesophageal reflux disease with esophagitis        Screening for malignant neoplasm of cervix        Screening for HIV (human immunodeficiency virus)        Need for prophylactic vaccination and inoculation against influenza           Follow-ups after your visit        Follow-up notes from your care team     Return in about 6 months (around 5/1/2019) for Physical Exam, depression/anxiety.      Your next 10 appointments already scheduled     May 01, 2019  7:45 AM CDT   [facility] Den[facility]    [address] [address] [address] [facility] [address] [phone]              Who to contact     If you have questions or need follow up information about today's clinic visit or your schedule please contact [facility] directly at [phone].  Normal or non-critical lab and imaging results will be communicated to you by uTrack TVhart, letter or phone within 4 business days after the clinic has received the results. If you do not hear from us within 7 days, please contact the clinic through Expert Dynamicst or phone. If you have a critical or abnormal lab result, we will notify you by phone as soon as possible.  Submit refill requests through Gridco or call your pharmacy and they will forward the refill request to us. Please allow 3 business days for your refill to be completed.          Additional Information About Your Visit        uTrack TVhart Information     Gridco gives you secure access to your electronic health record. If you see a primary care provider, you can also send messages to your care team and make appointments. If  you have questions, please call your primary care clinic.  If you do not have a primary care provider, please call [phone] and they will assist you.        Care EveryWhere ID     This is your Care EveryWhere ID. This could be used by other organizations to access your [place] medical records  [id number]        Your Vitals Were     Pulse Temperature Respirations BMI (Body Mass Index)          62 98.9  F (37.2  C) (Temporal) 12 27.38 kg/m2         Blood Pressure from Last 3 Encounters:   11/01/18 124/70   04/19/18 114/70   03/30/18 112/64    Weight from Last 3 Encounters:   11/01/18 157 lb (71.2 kg)   04/19/18 169 lb 14.4 oz (77.1 kg)   03/30/18 169 lb (76.7 kg)              Today, you had the following     No orders found for display       Primary Care Provider Office Phone # Fax #    Denise Schulte PA-C [phone] [address] [place] [address]        Equal Access to Services     [facility]: Hadii aad ku hadasho Soomaali, waaxda luqadaha, qaybta kaalmada adeegyada, roxana mcclendon hayestella quintero . So [facility] [phone].    ATENCIÓN: Si habla español, tiene a conrad disposición servicios gratuitos de asistencia lingüística. Llame al [phone].    We comply with applicable federal civil rights laws and [place] laws. We do not discriminate on the basis of race, color, national origin, age, disability, sex, sexual orientation, or gender identity.            Thank you!     Thank you for choosing [facility]  for your care. Our goal is always to provide you with excellent care. Hearing back from our patients is one way we can continue to improve our services. Please take a few minutes to complete the written survey that you may receive in the mail after your visit with us. Thank you!             Your Updated Medication List - Protect others around you: Learn how to safely use, store and throw away your medicines at www.disposemymeds.org.          This list is  accurate as of 11/1/18  8:42 AM.  Always use your most recent med list.                   Brand Name Dispense Instructions for use Diagnosis    aspirin 81 MG tablet     30 tablet    Take 1 tablet (81 mg) by mouth daily    Depression with anxiety, Irritable bowel syndrome with both constipation and diarrhea       dicyclomine 10 MG capsule    BENTYL    240 capsule    TAKE 1 CAPSULE FOUR TIMES A DAY BEFORE MEALS AND NIGHTLY    Chronic abdominal pain, Diverticulosis of large intestine without hemorrhage, Irritable bowel syndrome with both constipation and diarrhea       omeprazole 40 MG capsule    priLOSEC    90 capsule    Take 1 capsule (40 mg) by mouth daily Take 30-60 minutes before a meal.    Gastroesophageal reflux disease with esophagitis       PROBIOTIC & ACIDOPHILUS EX ST PO           venlafaxine 37.5 MG 24 hr capsule    EFFEXOR-XR    180 capsule    Take 2 capsules (75 mg) by mouth daily    Depression with anxiety       vitamin B complex with vitamin C Tabs tablet      Take 1 tablet by mouth daily        VITAMIN D (CHOLECALCIFEROL) PO      Take by mouth daily

## 2018-11-02 ASSESSMENT — ANXIETY QUESTIONNAIRES: GAD7 TOTAL SCORE: 7

## 2019-03-29 NOTE — PROGRESS NOTES
"  SUBJECTIVE:   Sun De La Garza is a 58 year old female who presents to clinic today for the following health issues:      History of Present Illness     Depression & Anxiety Follow-up:     Depression/Anxiety:  Depression only (She is doing much better, she has had a job change in its going well.  She works at [employer])    Status since last visit::  Stable    Other associated symptoms of depression::  None    Significant life event::  No    Current substance use::  None    Anxiety/Panic symptoms::  No       Today's PHQ-9         PHQ-9 Total Score:     1   PHQ-9 Q9 Thoughts of better off dead/self-harm past 2 weeks :   Not at all   Thoughts of suicide or self harm:      Self-harm Plan:        Self-harm Action:          Safety concerns for self or others:       MARIAN-7 Total Score: 0    Diet:  Other  Frequency of exercise:  2-3 days/week  Duration of exercise:  30-45 minutes  Taking medications regularly:  Yes  Medication side effects:  Not applicable  Additional concerns today:  No    Her stress is improved remarkably it has even improved her irritable bowel syndrome.  Problem list and histories reviewed & adjusted, as indicated.  Additional history: as documented        BP Readings from Last 3 Encounters:   04/03/19 126/70   11/01/18 124/70   04/19/18 114/70    Wt Readings from Last 3 Encounters:   04/03/19 77.6 kg (171 lb)   11/01/18 71.2 kg (157 lb)   04/19/18 77.1 kg (169 lb 14.4 oz)           Labs reviewed in EPIC    ROS:  CONSTITUTIONAL: NEGATIVE for fever, chills, change in weight  ENT/MOUTH: NEGATIVE for ear, mouth and throat problems  RESP: NEGATIVE for significant cough or SOB  CV: NEGATIVE for chest pain, palpitations or peripheral edema  GI: Irritable bowel, improved, now she tends to be more constipated  PSYCHIATRIC: See PHQ 9 and MARIAN 7 questionnaires for symptoms.     OBJECTIVE:     /70   Pulse 78   Temp 98.9  F (37.2  C) (Temporal)   Resp 16   Ht 1.613 m (5' 3.5\")   Wt 77.6 kg (171 " lb)   LMP  (LMP Unknown)   BMI 29.82 kg/m    Body mass index is 29.82 kg/m .  GENERAL: healthy, alert and no distress  PSYCH: mentation appears normal, affect normal/bright    Diagnostic Test Results:  none     ASSESSMENT/PLAN:       1. Depression with anxiety  Doing very well at the 75 mg we will switch to 1 pill daily, recheck 6 months  - venlafaxine (EFFEXOR-ER) 75 MG 24 hr tablet; Take 1 tablet (75 mg) by mouth daily  Dispense: 90 tablet; Refill: 1    2. Gastroesophageal reflux disease with esophagitis  Doing well continue her current meds she does not need refills    3. Irritable bowel syndrome with both constipation and diarrhea  Improved, she will try fiber supplement increase fluids, uses Bentyl very rarely    4. External hemorrhoids  She will try to avoid constipation she has requested a refill of hydrocortisone  - hydrocortisone (ANUSOL-HC) 2.5 % cream; Place rectally 2 times daily  Dispense: 90 g; Refill: 1    5. Internal hemorrhoids    - hydrocortisone (ANUSOL-HC) 2.5 % cream; Place rectally 2 times daily  Dispense: 90 g; Refill: 1    6. Need for prophylactic vaccination with tetanus-diphtheria (Td)  Updated today    7. Visit for screening mammogram  Appointment scheduled  - MA SCREENING DIGITAL BILAT - Future  (s+30); Future    8. Screening for malignant neoplasm of cervix  Has  physical in May      This chart documentation was completed in part with Dragon voice recognition software.  Documentation is reviewed after completion, however, some words and grammatical errors may remain.  JESU Jackson PA-C  [facility]  Answers for HPI/ROS submitted by the patient on 4/3/2019   Chronic problems general questions HPI Form  If you checked off any problems, how difficult have these problems made it for you to do your work, take care of things at home, or get along with other people?: Not difficult at all  PHQ9 TOTAL SCORE: 1  MARIAN 7 TOTAL SCORE: 0

## 2019-04-03 ENCOUNTER — OFFICE VISIT (OUTPATIENT)
Dept: FAMILY MEDICINE | Facility: OTHER | Age: 59
End: 2019-04-03
Payer: COMMERCIAL

## 2019-04-03 VITALS
RESPIRATION RATE: 16 BRPM | HEART RATE: 78 BPM | BODY MASS INDEX: 29.19 KG/M2 | WEIGHT: 171 LBS | TEMPERATURE: 98.9 F | HEIGHT: 64 IN | SYSTOLIC BLOOD PRESSURE: 126 MMHG | DIASTOLIC BLOOD PRESSURE: 70 MMHG

## 2019-04-03 DIAGNOSIS — Z23 NEED FOR VACCINATION: Primary | ICD-10-CM

## 2019-04-03 DIAGNOSIS — F41.8 DEPRESSION WITH ANXIETY: ICD-10-CM

## 2019-04-03 DIAGNOSIS — Z23 NEED FOR PROPHYLACTIC VACCINATION WITH TETANUS-DIPHTHERIA (TD): ICD-10-CM

## 2019-04-03 DIAGNOSIS — Z12.31 VISIT FOR SCREENING MAMMOGRAM: ICD-10-CM

## 2019-04-03 DIAGNOSIS — K64.4 EXTERNAL HEMORRHOIDS: ICD-10-CM

## 2019-04-03 DIAGNOSIS — K58.2 IRRITABLE BOWEL SYNDROME WITH BOTH CONSTIPATION AND DIARRHEA: ICD-10-CM

## 2019-04-03 DIAGNOSIS — Z12.4 SCREENING FOR MALIGNANT NEOPLASM OF CERVIX: ICD-10-CM

## 2019-04-03 DIAGNOSIS — K64.8 INTERNAL HEMORRHOIDS: ICD-10-CM

## 2019-04-03 DIAGNOSIS — K21.00 GASTROESOPHAGEAL REFLUX DISEASE WITH ESOPHAGITIS: ICD-10-CM

## 2019-04-03 PROCEDURE — 90714 TD VACC NO PRESV 7 YRS+ IM: CPT | Performed by: PHYSICIAN ASSISTANT

## 2019-04-03 PROCEDURE — 99214 OFFICE O/P EST MOD 30 MIN: CPT | Mod: 25 | Performed by: PHYSICIAN ASSISTANT

## 2019-04-03 PROCEDURE — 90471 IMMUNIZATION ADMIN: CPT | Performed by: PHYSICIAN ASSISTANT

## 2019-04-03 RX ORDER — DICYCLOMINE HYDROCHLORIDE 10 MG/1
CAPSULE ORAL
Qty: 240 CAPSULE | Refills: 0 | Status: CANCELLED | OUTPATIENT
Start: 2019-04-03

## 2019-04-03 RX ORDER — OMEPRAZOLE 40 MG/1
40 CAPSULE, DELAYED RELEASE ORAL DAILY
Qty: 90 CAPSULE | Refills: 3 | Status: CANCELLED | OUTPATIENT
Start: 2019-04-03

## 2019-04-03 RX ORDER — VENLAFAXINE HYDROCHLORIDE 37.5 MG/1
75 CAPSULE, EXTENDED RELEASE ORAL DAILY
Qty: 180 CAPSULE | Refills: 1 | Status: CANCELLED | OUTPATIENT
Start: 2019-04-03

## 2019-04-03 RX ORDER — VENLAFAXINE HYDROCHLORIDE 75 MG/1
75 TABLET, EXTENDED RELEASE ORAL DAILY
Qty: 90 TABLET | Refills: 1 | Status: SHIPPED | OUTPATIENT
Start: 2019-04-03 | End: 2019-10-14 | Stop reason: SINTOL

## 2019-04-03 ASSESSMENT — ANXIETY QUESTIONNAIRES
6. BECOMING EASILY ANNOYED OR IRRITABLE: NOT AT ALL
1. FEELING NERVOUS, ANXIOUS, OR ON EDGE: NOT AT ALL
GAD7 TOTAL SCORE: 0
GAD7 TOTAL SCORE: 0
7. FEELING AFRAID AS IF SOMETHING AWFUL MIGHT HAPPEN: NOT AT ALL
2. NOT BEING ABLE TO STOP OR CONTROL WORRYING: NOT AT ALL
GAD7 TOTAL SCORE: 0
3. WORRYING TOO MUCH ABOUT DIFFERENT THINGS: NOT AT ALL
4. TROUBLE RELAXING: NOT AT ALL
7. FEELING AFRAID AS IF SOMETHING AWFUL MIGHT HAPPEN: NOT AT ALL
5. BEING SO RESTLESS THAT IT IS HARD TO SIT STILL: NOT AT ALL

## 2019-04-03 ASSESSMENT — PATIENT HEALTH QUESTIONNAIRE - PHQ9
SUM OF ALL RESPONSES TO PHQ QUESTIONS 1-9: 1
10. IF YOU CHECKED OFF ANY PROBLEMS, HOW DIFFICULT HAVE THESE PROBLEMS MADE IT FOR YOU TO DO YOUR WORK, TAKE CARE OF THINGS AT HOME, OR GET ALONG WITH OTHER PEOPLE: NOT DIFFICULT AT ALL
SUM OF ALL RESPONSES TO PHQ QUESTIONS 1-9: 1

## 2019-04-03 ASSESSMENT — PAIN SCALES - GENERAL: PAINLEVEL: NO PAIN (0)

## 2019-04-03 ASSESSMENT — MIFFLIN-ST. JEOR: SCORE: 1342.71

## 2019-04-03 NOTE — NURSING NOTE
Prior to injection, verified patient identity using patient's name and date of birth.  Due to injection administration, patient instructed to remain in clinic for 15 minutes  afterwards, and to report any adverse reaction to me immediately.    Screening Questionnaire for Adult Immunization    Are you sick today?   No   Do you have allergies to medications, food, a vaccine component or latex?   No   Have you ever had a serious reaction after receiving a vaccination?   No   Do you have a long-term health problem with heart disease, lung disease, asthma, kidney disease, metabolic disease (e.g. diabetes), anemia, or other blood disorder?   No   Do you have cancer, leukemia, HIV/AIDS, or any other immune system problem?   No   In the past 3 months, have you taken medications that affect  your immune system, such as prednisone, other steroids, or anticancer drugs; drugs for the treatment of rheumatoid arthritis, Crohn s disease, or psoriasis; or have you had radiation treatments?   No   Have you had a seizure, or a brain or other nervous system problem?   No   During the past year, have you received a transfusion of blood or blood     products, or been given immune (gamma) globulin or antiviral drug?   No   For women: Are you pregnant or is there a chance you could become        pregnant during the next month?   No   Have you received any vaccinations in the past 4 weeks?   No     Immunization questionnaire answers were all negative.        Per orders of Denise Schulte PA-C, injection of td given by Leeann France. Patient instructed to remain in clinic for 15 minutes afterwards, and to report any adverse reaction to me immediately.       Screening performed by Leeann France on 4/3/2019 at 4:15 PM.

## 2019-04-04 ASSESSMENT — ANXIETY QUESTIONNAIRES: GAD7 TOTAL SCORE: 0
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
## 2019-04-29 ENCOUNTER — HOSPITAL ENCOUNTER (OUTPATIENT)
Dept: MAMMOGRAPHY | Facility: CLINIC | Age: 59
Discharge: HOME | End: 2019-04-29
Attending: PHYSICIAN ASSISTANT | Admitting: PHYSICIAN ASSISTANT
Payer: COMMERCIAL

## 2019-04-29 DIAGNOSIS — Z12.31 VISIT FOR SCREENING MAMMOGRAM: ICD-10-CM

## 2019-04-29 PROCEDURE — 77063 BREAST TOMOSYNTHESIS BI: CPT

## 2019-05-01 ENCOUNTER — OFFICE VISIT (OUTPATIENT)
Dept: FAMILY MEDICINE | Facility: OTHER | Age: 59
End: 2019-05-01
Payer: COMMERCIAL

## 2019-05-01 VITALS
BODY MASS INDEX: 28.82 KG/M2 | SYSTOLIC BLOOD PRESSURE: 122 MMHG | TEMPERATURE: 97.2 F | WEIGHT: 173 LBS | HEIGHT: 65 IN | DIASTOLIC BLOOD PRESSURE: 76 MMHG | RESPIRATION RATE: 16 BRPM | HEART RATE: 74 BPM

## 2019-05-01 DIAGNOSIS — Z12.31 VISIT FOR SCREENING MAMMOGRAM: ICD-10-CM

## 2019-05-01 DIAGNOSIS — Z11.4 SCREENING FOR HIV (HUMAN IMMUNODEFICIENCY VIRUS): ICD-10-CM

## 2019-05-01 DIAGNOSIS — Z00.00 ENCOUNTER FOR ROUTINE ADULT HEALTH EXAMINATION WITHOUT ABNORMAL FINDINGS: ICD-10-CM

## 2019-05-01 DIAGNOSIS — Z12.4 SCREENING FOR MALIGNANT NEOPLASM OF CERVIX: Primary | ICD-10-CM

## 2019-05-01 PROCEDURE — G0145 SCR C/V CYTO,THINLAYER,RESCR: HCPCS | Performed by: PHYSICIAN ASSISTANT

## 2019-05-01 PROCEDURE — 99396 PREV VISIT EST AGE 40-64: CPT | Performed by: PHYSICIAN ASSISTANT

## 2019-05-01 PROCEDURE — 87624 HPV HI-RISK TYP POOLED RSLT: CPT | Performed by: PHYSICIAN ASSISTANT

## 2019-05-01 ASSESSMENT — PATIENT HEALTH QUESTIONNAIRE - PHQ9
SUM OF ALL RESPONSES TO PHQ QUESTIONS 1-9: 2
SUM OF ALL RESPONSES TO PHQ QUESTIONS 1-9: 2

## 2019-05-01 ASSESSMENT — ANXIETY QUESTIONNAIRES
4. TROUBLE RELAXING: NOT AT ALL
5. BEING SO RESTLESS THAT IT IS HARD TO SIT STILL: NOT AT ALL
1. FEELING NERVOUS, ANXIOUS, OR ON EDGE: NOT AT ALL
6. BECOMING EASILY ANNOYED OR IRRITABLE: NOT AT ALL
3. WORRYING TOO MUCH ABOUT DIFFERENT THINGS: SEVERAL DAYS
GAD7 TOTAL SCORE: 2
GAD7 TOTAL SCORE: 2
2. NOT BEING ABLE TO STOP OR CONTROL WORRYING: SEVERAL DAYS
7. FEELING AFRAID AS IF SOMETHING AWFUL MIGHT HAPPEN: NOT AT ALL
7. FEELING AFRAID AS IF SOMETHING AWFUL MIGHT HAPPEN: NOT AT ALL
GAD7 TOTAL SCORE: 2

## 2019-05-01 ASSESSMENT — ENCOUNTER SYMPTOMS
NAUSEA: 0
COUGH: 0
SORE THROAT: 0
BREAST MASS: 0
JOINT SWELLING: 0
DYSURIA: 0
DIZZINESS: 0
CHILLS: 0
HEARTBURN: 0
SHORTNESS OF BREATH: 0
PALPITATIONS: 0
WEAKNESS: 0
PARESTHESIAS: 0
HEMATURIA: 0
FEVER: 0
FREQUENCY: 0
HEMATOCHEZIA: 0
DIARRHEA: 0
EYE PAIN: 0
ABDOMINAL PAIN: 0
HEADACHES: 0
MYALGIAS: 1
ARTHRALGIAS: 0
NERVOUS/ANXIOUS: 0
CONSTIPATION: 0

## 2019-05-01 ASSESSMENT — MIFFLIN-ST. JEOR: SCORE: 1368.72

## 2019-05-01 ASSESSMENT — PAIN SCALES - GENERAL: PAINLEVEL: NO PAIN (0)

## 2019-05-01 NOTE — LETTER
May 9, 2019    Sun ROBINSON Frederic  [address] [address] [address]    Dear MsJose DanielFrederic,  This letter is regarding your recent Pap smear (cervical cancer screening) and Human Papillomavirus (HPV) test.  We are happy to inform you that your Pap smear result is normal. Cervical cancer is closely linked with certain types of HPV. Your results showed no evidence of high-risk HPV.  Therefore we recommend you return in 5 years for your next pap smear and HPV test.  You will still need to return to the clinic every year for an annual exam and other preventive tests.  If you have additional questions regarding this result, please call our registered nurse, Ruth at [phone].  Sincerely,    Denise Schulte PA-C/sincere

## 2019-05-02 ASSESSMENT — ANXIETY QUESTIONNAIRES: GAD7 TOTAL SCORE: 2

## 2019-05-06 LAB
COPATH REPORT: NORMAL
PAP: NORMAL

## 2019-05-07 LAB
FINAL DIAGNOSIS: NORMAL
HPV HR 12 DNA CVX QL NAA+PROBE: NEGATIVE
HPV16 DNA SPEC QL NAA+PROBE: NEGATIVE
HPV18 DNA SPEC QL NAA+PROBE: NEGATIVE
SPECIMEN DESCRIPTION: NORMAL
SPECIMEN SOURCE CVX/VAG CYTO: NORMAL

## 2019-05-20 ENCOUNTER — TELEPHONE (OUTPATIENT)
Dept: FAMILY MEDICINE | Facility: OTHER | Age: 59
End: 2019-05-20

## 2019-05-20 NOTE — TELEPHONE ENCOUNTER
Left message for patient to return call to clinic. When call is returned please inform patient I have put this inform together for her. How would she like to receive it?    *this is saved in letters.*     Antolin Sanderson,

## 2019-05-20 NOTE — LETTER
[facility]  [address]  [place] [address]  [phone]          May 20, 2019    RE:Sun De La Garza                                                                                                                     [address] [address] [address]      Blood Pressure as of 05/01/2019: 122/76   Pulse: 74    Component      Latest Ref Rng & Units 3/30/2018   Cholesterol      <200 mg/dL 167   Triglycerides      <150 mg/dL 100   HDL Cholesterol      >49 mg/dL 51   LDL Cholesterol Calculated      <100 mg/dL 96   Non HDL Cholesterol      <130 mg/dL 116   Glucose      70 - 99 mg/dL 82             Sincerely,     Denise Schulte PA-C

## 2019-05-20 NOTE — TELEPHONE ENCOUNTER
Reason for Call:  Other needs info from chart    Detailed comments: Last BP, Glucose and Cholesterol results. Patient was wondering if she could just have all these on a piece of paper?    Phone Number Patient can be reached at: Cell number on file:    Telephone Information:   Mobile [phone]       Best Time: any    Can we leave a detailed message on this number? YES    Call taken on 5/20/2019 at 4:18 PM by Lily Vargas

## 2019-05-22 NOTE — TELEPHONE ENCOUNTER
Left detailed message that due to no response back that I will be mailing the requested information.     Antolin Sanderson,

## 2019-05-29 ENCOUNTER — HOSPITAL ENCOUNTER (EMERGENCY)
Facility: CLINIC | Age: 59
Discharge: HOME | End: 2019-05-29
Attending: FAMILY MEDICINE | Admitting: FAMILY MEDICINE
Payer: COMMERCIAL

## 2019-05-29 ENCOUNTER — APPOINTMENT (OUTPATIENT)
Dept: GENERAL RADIOLOGY | Facility: CLINIC | Age: 59
End: 2019-05-29
Attending: FAMILY MEDICINE
Payer: COMMERCIAL

## 2019-05-29 VITALS
RESPIRATION RATE: 20 BRPM | TEMPERATURE: 98.2 F | HEIGHT: 63 IN | SYSTOLIC BLOOD PRESSURE: 129 MMHG | WEIGHT: 170 LBS | BODY MASS INDEX: 30.12 KG/M2 | OXYGEN SATURATION: 92 % | HEART RATE: 65 BPM | DIASTOLIC BLOOD PRESSURE: 82 MMHG

## 2019-05-29 DIAGNOSIS — R07.89 CHEST WALL PAIN: ICD-10-CM

## 2019-05-29 LAB
ALBUMIN SERPL-MCNC: 3.5 G/DL (ref 3.4–5)
ALBUMIN UR-MCNC: NEGATIVE MG/DL
ALP SERPL-CCNC: 105 U/L (ref 40–150)
ALT SERPL W P-5'-P-CCNC: 29 U/L (ref 0–50)
ANION GAP SERPL CALCULATED.3IONS-SCNC: 6 MMOL/L (ref 3–14)
APPEARANCE UR: CLEAR
AST SERPL W P-5'-P-CCNC: 17 U/L (ref 0–45)
BASOPHILS # BLD AUTO: 0.1 10E9/L (ref 0–0.2)
BASOPHILS NFR BLD AUTO: 0.7 %
BILIRUB SERPL-MCNC: 0.4 MG/DL (ref 0.2–1.3)
BILIRUB UR QL STRIP: NEGATIVE
BUN SERPL-MCNC: 16 MG/DL (ref 7–30)
CALCIUM SERPL-MCNC: 8.3 MG/DL (ref 8.5–10.1)
CHLORIDE SERPL-SCNC: 107 MMOL/L (ref 94–109)
CO2 SERPL-SCNC: 27 MMOL/L (ref 20–32)
COLOR UR AUTO: YELLOW
CREAT SERPL-MCNC: 0.7 MG/DL (ref 0.52–1.04)
D DIMER PPP FEU-MCNC: 0.3 UG/ML FEU (ref 0–0.5)
DIFFERENTIAL METHOD BLD: NORMAL
EOSINOPHIL NFR BLD AUTO: 3.5 %
ERYTHROCYTE [DISTWIDTH] IN BLOOD BY AUTOMATED COUNT: 13.8 % (ref 10–15)
ERYTHROCYTE [SEDIMENTATION RATE] IN BLOOD BY WESTERGREN METHOD: 19 MM/H (ref 0–30)
GFR SERPL CREATININE-BSD FRML MDRD: >90 ML/MIN/{1.73_M2}
GLUCOSE SERPL-MCNC: 94 MG/DL (ref 70–99)
GLUCOSE UR STRIP-MCNC: NEGATIVE MG/DL
HCT VFR BLD AUTO: 39.3 % (ref 35–47)
HGB BLD-MCNC: 12.8 G/DL (ref 11.7–15.7)
HGB UR QL STRIP: NEGATIVE
IMM GRANULOCYTES # BLD: 0 10E9/L (ref 0–0.4)
IMM GRANULOCYTES NFR BLD: 0.5 %
KETONES UR STRIP-MCNC: NEGATIVE MG/DL
LEUKOCYTE ESTERASE UR QL STRIP: NEGATIVE
LYMPHOCYTES # BLD AUTO: 2.4 10E9/L (ref 0.8–5.3)
LYMPHOCYTES NFR BLD AUTO: 32.5 %
MCH RBC QN AUTO: 28.7 PG (ref 26.5–33)
MCHC RBC AUTO-ENTMCNC: 32.6 G/DL (ref 31.5–36.5)
MCV RBC AUTO: 88 FL (ref 78–100)
MONOCYTES # BLD AUTO: 0.6 10E9/L (ref 0–1.3)
MONOCYTES NFR BLD AUTO: 8 %
NEUTROPHILS # BLD AUTO: 4.1 10E9/L (ref 1.6–8.3)
NEUTROPHILS NFR BLD AUTO: 54.8 %
NITRATE UR QL: NEGATIVE
NRBC # BLD AUTO: 0 10*3/UL
NRBC BLD AUTO-RTO: 0 /100
PH UR STRIP: 6 PH (ref 5–7)
PLATELET # BLD AUTO: 276 10E9/L (ref 150–450)
POTASSIUM SERPL-SCNC: 3.8 MMOL/L (ref 3.4–5.3)
PROT SERPL-MCNC: 7.5 G/DL (ref 6.8–8.8)
RBC # BLD AUTO: 4.46 10E12/L (ref 3.8–5.2)
SODIUM SERPL-SCNC: 140 MMOL/L (ref 133–144)
SOURCE: NORMAL
SP GR UR STRIP: 1 (ref 1–1.03)
TROPONIN I SERPL-MCNC: <0.015 UG/L (ref 0–0.04)
UROBILINOGEN UR STRIP-MCNC: 0 MG/DL (ref 0–2)
WBC # BLD AUTO: 7.5 10E9/L (ref 4–11)

## 2019-05-29 PROCEDURE — 93005 ELECTROCARDIOGRAM TRACING: CPT | Performed by: FAMILY MEDICINE

## 2019-05-29 PROCEDURE — 84484 ASSAY OF TROPONIN QUANT: CPT | Performed by: FAMILY MEDICINE

## 2019-05-29 PROCEDURE — 81003 URINALYSIS AUTO W/O SCOPE: CPT | Performed by: FAMILY MEDICINE

## 2019-05-29 PROCEDURE — 96374 THER/PROPH/DIAG INJ IV PUSH: CPT | Performed by: FAMILY MEDICINE

## 2019-05-29 PROCEDURE — 99285 EMERGENCY DEPT VISIT HI MDM: CPT | Mod: 25 | Performed by: FAMILY MEDICINE

## 2019-05-29 PROCEDURE — 85025 COMPLETE CBC W/AUTO DIFF WBC: CPT | Performed by: FAMILY MEDICINE

## 2019-05-29 PROCEDURE — 71046 X-RAY EXAM CHEST 2 VIEWS: CPT | Mod: TC

## 2019-05-29 PROCEDURE — 85379 FIBRIN DEGRADATION QUANT: CPT | Performed by: FAMILY MEDICINE

## 2019-05-29 PROCEDURE — 80053 COMPREHEN METABOLIC PANEL: CPT | Performed by: FAMILY MEDICINE

## 2019-05-29 PROCEDURE — 93010 ELECTROCARDIOGRAM REPORT: CPT | Mod: Z6 | Performed by: FAMILY MEDICINE

## 2019-05-29 PROCEDURE — 25000128 H RX IP 250 OP 636: Performed by: FAMILY MEDICINE

## 2019-05-29 PROCEDURE — 85652 RBC SED RATE AUTOMATED: CPT | Performed by: FAMILY MEDICINE

## 2019-05-29 RX ORDER — METHYLPREDNISOLONE 4 MG
TABLET, DOSE PACK ORAL
Qty: 21 TABLET | Refills: 0 | Status: SHIPPED | OUTPATIENT
Start: 2019-05-29 | End: 2019-11-18

## 2019-05-29 RX ORDER — KETOROLAC TROMETHAMINE 30 MG/ML
30 INJECTION, SOLUTION INTRAMUSCULAR; INTRAVENOUS ONCE
Status: COMPLETED | OUTPATIENT
Start: 2019-05-29 | End: 2019-05-29

## 2019-05-29 RX ADMIN — KETOROLAC TROMETHAMINE 30 MG: 30 INJECTION, SOLUTION INTRAMUSCULAR at 06:31

## 2019-05-29 ASSESSMENT — MIFFLIN-ST. JEOR: SCORE: 1330.24

## 2019-05-29 NOTE — ED AVS SNAPSHOT
[facility] Emergency Department  [address] [place] [address]  Phone:  [phone]  Fax:  [phone]                                    Sun De La Garza   MRN: [id number]    Department:  [facility] Emergency Department   Date of Visit:  5/29/2019           After Visit Summary Signature Page    I have received my discharge instructions, and my questions have been answered. I have discussed any challenges I see with this plan with the nurse or doctor.    ..........................................................................................................................................  Patient/Patient Representative Signature      ..........................................................................................................................................  Patient Representative Print Name and Relationship to Patient    ..................................................               ................................................  Date                                   Time    ..........................................................................................................................................  Reviewed by Signature/Title    ...................................................              ..............................................  Date                                               Time          22EPIC Rev 08/18

## 2019-05-29 NOTE — ED PROVIDER NOTES
Patient is a 58-year-old female presents with left-sided chest pain thought to be chest wall pain.  Did a lot of lifting over the weekend prior to onset of symptoms.  Please see Dr. Fontana's note for initial details on exam.  At shift change patient was signed out to me for review of her blood work, chest x-ray, and urinalysis.  Patient's blood work was unremarkable including troponin and d-dimer.  Chest x-ray did not show any acute abnormality including pneumothorax or infiltrate.  Urinalysis was negative.        EKG Interpretation:      Interpreted by Jean Monte  Time reviewed:7:53  Symptoms at time of EKG: Left-sided chest pain  Rhythm: Normal sinus   Rate: Normal  Axis: Normal  Ectopy: None  Conduction: Normal  ST Segments/ T Waves: No ST-T wave changes and No acute ischemic changes  Q Waves: None  Comparison to prior: Unchanged from 1/13    Clinical Impression: normal EKG    EKG did not show any acute ischemic changes was unchanged from previous.  She does have reproducible component to her pain.  Suspect this is the cause with her lifting over the weekend.  Information on chest wall pain is provided.  She can take ibuprofen for pain.  Ice to the affected area.  Avoid heavy lifting.  Medrol Dosepak for additional anti-inflammatory effects.  Reasons to return to the emergency room were discussed.    Assessment/plan: Chest wall pain plan as above.         Jean Monte MD  05/29/19 0850

## 2019-05-29 NOTE — DISCHARGE INSTRUCTIONS
Ibuprofen for pain.  Ice not heat to the affected area.  Avoid excessive or heavy lifting.  Medrol Dosepak for its anti-inflammatory effects.  This may cause stomach upset so take this with food.

## 2019-05-29 NOTE — ED TRIAGE NOTES
Started on Monday with chest wall pain on the left side that radiates down entire side. Last night and today pain became intense , unable to move with out severe pain

## 2019-05-29 NOTE — ED NOTES
"Assumed care of pt-assisted up to bathroom. States \"there really isn't any change\" when asked if the Toradol was helpful.  "
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
## 2019-10-14 ENCOUNTER — OFFICE VISIT (OUTPATIENT)
Dept: FAMILY MEDICINE | Facility: OTHER | Age: 59
End: 2019-10-14
Payer: COMMERCIAL

## 2019-10-14 VITALS
HEART RATE: 86 BPM | OXYGEN SATURATION: 100 % | RESPIRATION RATE: 16 BRPM | SYSTOLIC BLOOD PRESSURE: 126 MMHG | BODY MASS INDEX: 31.89 KG/M2 | WEIGHT: 180 LBS | HEIGHT: 63 IN | DIASTOLIC BLOOD PRESSURE: 78 MMHG | TEMPERATURE: 98 F

## 2019-10-14 DIAGNOSIS — F41.8 DEPRESSION WITH ANXIETY: ICD-10-CM

## 2019-10-14 LAB — TSH SERPL DL<=0.005 MIU/L-ACNC: 1.53 MU/L (ref 0.4–4)

## 2019-10-14 PROCEDURE — 99214 OFFICE O/P EST MOD 30 MIN: CPT | Performed by: PHYSICIAN ASSISTANT

## 2019-10-14 PROCEDURE — 82306 VITAMIN D 25 HYDROXY: CPT | Performed by: PHYSICIAN ASSISTANT

## 2019-10-14 PROCEDURE — 84443 ASSAY THYROID STIM HORMONE: CPT | Performed by: PHYSICIAN ASSISTANT

## 2019-10-14 PROCEDURE — 36415 COLL VENOUS BLD VENIPUNCTURE: CPT | Performed by: PHYSICIAN ASSISTANT

## 2019-10-14 RX ORDER — BUPROPION HYDROCHLORIDE 150 MG/1
150 TABLET ORAL EVERY MORNING
Qty: 30 TABLET | Refills: 1 | Status: SHIPPED | OUTPATIENT
Start: 2019-10-14 | End: 2019-12-11

## 2019-10-14 RX ORDER — VENLAFAXINE HYDROCHLORIDE 37.5 MG/1
37.5 CAPSULE, EXTENDED RELEASE ORAL DAILY
Qty: 30 CAPSULE | Refills: 0 | Status: SHIPPED | OUTPATIENT
Start: 2019-10-14 | End: 2019-11-18

## 2019-10-14 ASSESSMENT — PATIENT HEALTH QUESTIONNAIRE - PHQ9
SUM OF ALL RESPONSES TO PHQ QUESTIONS 1-9: 12
SUM OF ALL RESPONSES TO PHQ QUESTIONS 1-9: 12
10. IF YOU CHECKED OFF ANY PROBLEMS, HOW DIFFICULT HAVE THESE PROBLEMS MADE IT FOR YOU TO DO YOUR WORK, TAKE CARE OF THINGS AT HOME, OR GET ALONG WITH OTHER PEOPLE: SOMEWHAT DIFFICULT

## 2019-10-14 ASSESSMENT — ANXIETY QUESTIONNAIRES
3. WORRYING TOO MUCH ABOUT DIFFERENT THINGS: SEVERAL DAYS
GAD7 TOTAL SCORE: 2
4. TROUBLE RELAXING: NOT AT ALL
7. FEELING AFRAID AS IF SOMETHING AWFUL MIGHT HAPPEN: NOT AT ALL
GAD7 TOTAL SCORE: 2
6. BECOMING EASILY ANNOYED OR IRRITABLE: NOT AT ALL
1. FEELING NERVOUS, ANXIOUS, OR ON EDGE: NOT AT ALL
GAD7 TOTAL SCORE: 2
2. NOT BEING ABLE TO STOP OR CONTROL WORRYING: SEVERAL DAYS
7. FEELING AFRAID AS IF SOMETHING AWFUL MIGHT HAPPEN: NOT AT ALL
5. BEING SO RESTLESS THAT IT IS HARD TO SIT STILL: NOT AT ALL

## 2019-10-14 ASSESSMENT — MIFFLIN-ST. JEOR: SCORE: 1370.6

## 2019-10-14 ASSESSMENT — PAIN SCALES - GENERAL: PAINLEVEL: EXTREME PAIN (8)

## 2019-10-14 NOTE — LETTER
[facility]  [address], [place] [address]  [phone]      October 16, 2019    Sun De La Garza                                                                                                                     [id number] [address] [id number]            Dear Sun,    Your thyroid test is normal.  Your vit D level is very high.  Please reduce the dosage of your Vit D to  1000 IU daily, if you continue the 10,000 IU a day you are taking now you may become toxic from the high dosage.         Sincerely,         Denise Schulte PA-C

## 2019-10-15 LAB — DEPRECATED CALCIDIOL+CALCIFEROL SERPL-MC: 71 UG/L (ref 20–75)

## 2019-10-15 ASSESSMENT — PATIENT HEALTH QUESTIONNAIRE - PHQ9: SUM OF ALL RESPONSES TO PHQ QUESTIONS 1-9: 12

## 2019-10-15 ASSESSMENT — ANXIETY QUESTIONNAIRES: GAD7 TOTAL SCORE: 2

## 2019-10-18 ENCOUNTER — TELEPHONE (OUTPATIENT)
Dept: FAMILY MEDICINE | Facility: OTHER | Age: 59
End: 2019-10-18

## 2019-10-18 NOTE — TELEPHONE ENCOUNTER
Reason for Call:  Form, our goal is to have forms completed with 72 hours, however, some forms may require a visit or additional information.    Type of letter, form or note:  medical    Who is the form from?: [employer] (if other please explain)    Where did the form come from: Patient or family brought in       What clinic location was the form placed at?: [facility] - [phone]    Where the form was placed: box Box/Folder    What number is listed as a contact on the form?: n/a       Additional comments: n/a    Call taken on 10/18/2019 at 7:57 AM by Carolynn Chao

## 2019-10-21 NOTE — TELEPHONE ENCOUNTER
Form completed to the best of my ability. Form given to provider in her forms box.    Leeann France CMA ([facility])

## 2019-10-21 NOTE — TELEPHONE ENCOUNTER
Pt informed. Pt states that someone just called her from the clinic couple minutes ago regarding to message below.     I huddled with provider MA.     Nasir Kothari MA

## 2019-10-21 NOTE — TELEPHONE ENCOUNTER
Spoke with patient, she would to leave form as it is and if they require the cholesterol she will come back and have it done.     Faxed biometric screening and mailed other form with copy per patient request.     All forms have been sent to scanning.     Antolin Sanderson,

## 2019-10-21 NOTE — TELEPHONE ENCOUNTER
Forms are complete, she does not have an updated cholesterol.  We can order this and added to the form if she would like or she can submit it as is.  Please copy and scanned all forms for future reference.  Denise Schulte PA-C

## 2019-10-30 NOTE — PROGRESS NOTES
"Sun De La Garza is a 59 year old female who is being evaluated via a billable telephone visit.      The patient has been notified of following:     \"This telephone visit will be conducted via a call between you and your physician/provider. We have found that certain health care needs can be provided without the need for a physical exam.  This service lets us provide the care you need with a short phone conversation.  If a prescription is necessary we can send it directly to your pharmacy.  If lab work is needed we can place an order for that and you can then stop by our lab to have the test done at a later time.    If during the course of the call the physician/provider feels a telephone visit is not appropriate, you will not be charged for this service.\"     Consent has been obtained for this service by 1 care team member: yes. See the scanned image in the medical record.    Sun De La Garza complains of    Chief Complaint   Patient presents with     Depression     Anxiety       I have reviewed and updated the patient's Past Medical History, Social History, Family History and Medication List.    ALLERGIES  Patient has no known allergies.    Leeann France CMA ([facility]) (MA signature)    Additional provider notes:   She is doing better overall, less nightmares as she weaned off effexor, now none since she is only on Wellbutrin.  She wonders if she needs a higher dosage.  She has had a lot of family stress and been more emotional.    Also, work would like me to add something to her [facility] paperwork that states she can leave as needed when there she has \"an episode\" if needed    Assessment/Plan:  (F41.8) Depression with anxiety  (primary encounter diagnosis)  Comment: Increase dosage of bupropion to 300 mg daily, no side effects noted at the 150 if she is overtreated we could try 100 mg twice daily  Plan: buPROPion (WELLBUTRIN XL) 300 MG 24 hr tablet,         NONPHYSICIAN TELEPHONE ASSESSMENT 5-10 MIN        This chart " documentation was completed in part with Dragon voice recognition software.  Documentation is reviewed after completion, however, some words and grammatical errors may remain.  Denise Schulte PA-C        I have reviewed the note as documented above.  This accurately captures the substance of my conversation with the patient.  Electronically signed by Denise Schulte PA-C     Total time of call between patient and provider was 8 minutes     Denise Schulte PA-C

## 2019-11-11 ENCOUNTER — TELEPHONE (OUTPATIENT)
Dept: SCHEDULING | Facility: CLINIC | Age: 59
End: 2019-11-11

## 2019-11-11 DIAGNOSIS — F41.8 DEPRESSION WITH ANXIETY: ICD-10-CM

## 2019-11-11 RX ORDER — BUPROPION HYDROCHLORIDE 150 MG/1
150 TABLET ORAL EVERY MORNING
Qty: 30 TABLET | Refills: 1 | Status: CANCELLED | OUTPATIENT
Start: 2019-11-11

## 2019-11-11 NOTE — TELEPHONE ENCOUNTER
Please call patient. She will need some type of visit is she feels that she needs on increase in medication.    Chris Almaguer RN, BSN

## 2019-11-11 NOTE — TELEPHONE ENCOUNTER
Reason for call:  Medication   If this is a refill request, has the caller requested the refill from the pharmacy already? N/A  Will the patient be using a [facility]? N/A  Name of the pharmacy and phone number for the current request: N/A  Name of the medication requested: Bupropion    Other request: patient would like to increase her Bupropion 150mg      Phone number to reach patient:  Home number on file [phone]    Best Time:  anytime    Can we leave a detailed message on this number?  YES

## 2019-11-12 NOTE — TELEPHONE ENCOUNTER
Pt had scheduled a telephone visit for Monday the 18th. If this is not okay please contact pt to r/s. Please call cell.

## 2019-11-12 NOTE — TELEPHONE ENCOUNTER
Left message for patient to return call to clinic. When call is returned please see RN message.       Antolin Sanderson,

## 2019-11-18 ENCOUNTER — VIRTUAL VISIT (OUTPATIENT)
Dept: FAMILY MEDICINE | Facility: OTHER | Age: 59
End: 2019-11-18
Payer: COMMERCIAL

## 2019-11-18 DIAGNOSIS — F41.8 DEPRESSION WITH ANXIETY: Primary | ICD-10-CM

## 2019-11-18 PROCEDURE — 99441 C NONPHYSICIAN TELEPHONE ASSESSMENT 5-10 MIN: CPT | Performed by: PHYSICIAN ASSISTANT

## 2019-11-18 RX ORDER — BUPROPION HYDROCHLORIDE 300 MG/1
300 TABLET ORAL EVERY MORNING
Qty: 30 TABLET | Refills: 1 | Status: SHIPPED | OUTPATIENT
Start: 2019-11-18 | End: 2019-12-11

## 2019-11-18 ASSESSMENT — PATIENT HEALTH QUESTIONNAIRE - PHQ9
5. POOR APPETITE OR OVEREATING: NOT AT ALL
SUM OF ALL RESPONSES TO PHQ QUESTIONS 1-9: 4

## 2019-11-18 ASSESSMENT — ANXIETY QUESTIONNAIRES
6. BECOMING EASILY ANNOYED OR IRRITABLE: NOT AT ALL
2. NOT BEING ABLE TO STOP OR CONTROL WORRYING: SEVERAL DAYS
7. FEELING AFRAID AS IF SOMETHING AWFUL MIGHT HAPPEN: MORE THAN HALF THE DAYS
GAD7 TOTAL SCORE: 6
5. BEING SO RESTLESS THAT IT IS HARD TO SIT STILL: NOT AT ALL
IF YOU CHECKED OFF ANY PROBLEMS ON THIS QUESTIONNAIRE, HOW DIFFICULT HAVE THESE PROBLEMS MADE IT FOR YOU TO DO YOUR WORK, TAKE CARE OF THINGS AT HOME, OR GET ALONG WITH OTHER PEOPLE: SOMEWHAT DIFFICULT
3. WORRYING TOO MUCH ABOUT DIFFERENT THINGS: NEARLY EVERY DAY
1. FEELING NERVOUS, ANXIOUS, OR ON EDGE: NOT AT ALL

## 2019-11-19 ASSESSMENT — ANXIETY QUESTIONNAIRES: GAD7 TOTAL SCORE: 6

## 2019-11-20 ENCOUNTER — TELEPHONE (OUTPATIENT)
Dept: FAMILY MEDICINE | Facility: OTHER | Age: 59
End: 2019-11-20

## 2019-11-20 NOTE — TELEPHONE ENCOUNTER
Reason for Call:  Form, our goal is to have forms completed with 72 hours, however, some forms may require a visit or additional information.    Type of letter, form or note:  FMLA    Who is the form from?: Patient HR (if other please explain)    Where did the form come from: Patient or family brought in       What clinic location was the form placed at?: [facility] - [phone]    Where the form was placed: Placed in Providers box    What number is listed as a contact on the form?: n/a       Additional comments: Patient will  when complete-needs ASAP    Call taken on 11/20/2019 at 4:03 PM by Layne Parra

## 2019-12-11 ENCOUNTER — TELEPHONE (OUTPATIENT)
Dept: FAMILY MEDICINE | Facility: OTHER | Age: 59
End: 2019-12-11

## 2019-12-11 ENCOUNTER — VIRTUAL VISIT (OUTPATIENT)
Dept: FAMILY MEDICINE | Facility: OTHER | Age: 59
End: 2019-12-11
Payer: COMMERCIAL

## 2019-12-11 DIAGNOSIS — F41.8 DEPRESSION WITH ANXIETY: ICD-10-CM

## 2019-12-11 PROCEDURE — 99207 ZZC NO BILLABLE SERVICE THIS VISIT: CPT | Performed by: PHYSICIAN ASSISTANT

## 2019-12-11 RX ORDER — BUPROPION HYDROCHLORIDE 300 MG/1
300 TABLET ORAL EVERY MORNING
Qty: 30 TABLET | Refills: 0 | Status: SHIPPED | OUTPATIENT
Start: 2019-12-11 | End: 2020-04-03

## 2019-12-11 RX ORDER — BUPROPION HYDROCHLORIDE 300 MG/1
300 TABLET ORAL EVERY MORNING
Qty: 90 TABLET | Refills: 1 | Status: SHIPPED | OUTPATIENT
Start: 2019-12-11 | End: 2019-12-11

## 2019-12-11 ASSESSMENT — PATIENT HEALTH QUESTIONNAIRE - PHQ9
5. POOR APPETITE OR OVEREATING: NOT AT ALL
SUM OF ALL RESPONSES TO PHQ QUESTIONS 1-9: 4

## 2019-12-11 ASSESSMENT — ANXIETY QUESTIONNAIRES
1. FEELING NERVOUS, ANXIOUS, OR ON EDGE: NOT AT ALL
6. BECOMING EASILY ANNOYED OR IRRITABLE: NOT AT ALL
7. FEELING AFRAID AS IF SOMETHING AWFUL MIGHT HAPPEN: NOT AT ALL
GAD7 TOTAL SCORE: 3
2. NOT BEING ABLE TO STOP OR CONTROL WORRYING: SEVERAL DAYS
IF YOU CHECKED OFF ANY PROBLEMS ON THIS QUESTIONNAIRE, HOW DIFFICULT HAVE THESE PROBLEMS MADE IT FOR YOU TO DO YOUR WORK, TAKE CARE OF THINGS AT HOME, OR GET ALONG WITH OTHER PEOPLE: SOMEWHAT DIFFICULT
3. WORRYING TOO MUCH ABOUT DIFFERENT THINGS: MORE THAN HALF THE DAYS
5. BEING SO RESTLESS THAT IT IS HARD TO SIT STILL: NOT AT ALL

## 2019-12-11 NOTE — TELEPHONE ENCOUNTER
AP for patient to call clinic back. Patient has a Tele visit with Denise Schulte today.   She needs to consent for her visit today, fill out PHQ-9 and MARIAN-7.     Inderjit Edwards MA

## 2019-12-11 NOTE — PROGRESS NOTES
"Sun De La Garza is a 59 year old female who is being evaluated via a billable telephone visit.      The patient has been notified of following:     \"This telephone visit will be conducted via a call between you and your physician/provider. We have found that certain health care needs can be provided without the need for a physical exam.  This service lets us provide the care you need with a short phone conversation.  If a prescription is necessary we can send it directly to your pharmacy.  If lab work is needed we can place an order for that and you can then stop by our lab to have the test done at a later time.    If during the course of the call the physician/provider feels a telephone visit is not appropriate, you will not be charged for this service.\"     Consent has been obtained for this service by 1 care team member: yes. See the scanned image in the medical record.    Sun De La Garza complains of  No chief complaint on file.      I have reviewed and updated the patient's Past Medical History, Social History, Family History and Medication List.    ALLERGIES  Patient has no known allergies.     (MA signature) Tomas HAMILTON    Additional provider notes: She has done much better after we increased her Wellbutrin to 300 mg.  She still struggles with family issues it is better able to manage and adapt to situations.  She has no side effects with increasing the dosage      Assessment/Plan:  (F41.8) Depression with anxiety  Comment: Doing very well, continue current meds recheck 6 months  Plan: buPROPion (WELLBUTRIN XL) 300 MG 24 hr tablet,         DISCONTINUED: buPROPion (WELLBUTRIN XL) 300 MG         24 hr tablet              I have reviewed the note as documented above.  This accurately captures the substance of my conversation with the patient.  Electronically signed by Denise Schulte PA-C     Total time of call between patient and provider was 3 minutes     Denise Schulte PA-C      "

## 2019-12-12 ASSESSMENT — ANXIETY QUESTIONNAIRES: GAD7 TOTAL SCORE: 3

## 2019-12-12 NOTE — TELEPHONE ENCOUNTER
Pt returned call and did visit yesterday. Will close.    Leeann France CMA ([facility])

## 2020-03-24 ENCOUNTER — APPOINTMENT (OUTPATIENT)
Dept: CT IMAGING | Facility: CLINIC | Age: 60
End: 2020-03-24
Attending: FAMILY MEDICINE
Payer: COMMERCIAL

## 2020-03-24 ENCOUNTER — HOSPITAL ENCOUNTER (EMERGENCY)
Facility: CLINIC | Age: 60
Discharge: HOME | End: 2020-03-24
Attending: FAMILY MEDICINE | Admitting: FAMILY MEDICINE
Payer: COMMERCIAL

## 2020-03-24 VITALS
TEMPERATURE: 98.2 F | OXYGEN SATURATION: 99 % | HEART RATE: 74 BPM | RESPIRATION RATE: 16 BRPM | DIASTOLIC BLOOD PRESSURE: 89 MMHG | WEIGHT: 169.4 LBS | BODY MASS INDEX: 30.01 KG/M2 | SYSTOLIC BLOOD PRESSURE: 111 MMHG

## 2020-03-24 DIAGNOSIS — R10.32 ABDOMINAL PAIN, LEFT LOWER QUADRANT: ICD-10-CM

## 2020-03-24 DIAGNOSIS — K57.32 DIVERTICULITIS OF COLON: ICD-10-CM

## 2020-03-24 LAB
ALBUMIN SERPL-MCNC: 3.6 G/DL (ref 3.4–5)
ALBUMIN UR-MCNC: NEGATIVE MG/DL
ALP SERPL-CCNC: 105 U/L (ref 40–150)
ALT SERPL W P-5'-P-CCNC: 25 U/L (ref 0–50)
ANION GAP SERPL CALCULATED.3IONS-SCNC: 8 MMOL/L (ref 3–14)
APPEARANCE UR: CLEAR
AST SERPL W P-5'-P-CCNC: 14 U/L (ref 0–45)
BASOPHILS # BLD AUTO: 0.1 10E9/L (ref 0–0.2)
BASOPHILS NFR BLD AUTO: 0.5 %
BILIRUB SERPL-MCNC: 0.4 MG/DL (ref 0.2–1.3)
BILIRUB UR QL STRIP: NEGATIVE
BUN SERPL-MCNC: 11 MG/DL (ref 7–30)
CALCIUM SERPL-MCNC: 8.6 MG/DL (ref 8.5–10.1)
CHLORIDE SERPL-SCNC: 109 MMOL/L (ref 94–109)
CO2 SERPL-SCNC: 25 MMOL/L (ref 20–32)
COLOR UR AUTO: NORMAL
CREAT SERPL-MCNC: 0.79 MG/DL (ref 0.52–1.04)
CRP SERPL-MCNC: 25.2 MG/L (ref 0–8)
DIFFERENTIAL METHOD BLD: NORMAL
EOSINOPHIL NFR BLD AUTO: 1.3 %
ERYTHROCYTE [DISTWIDTH] IN BLOOD BY AUTOMATED COUNT: 12.8 % (ref 10–15)
GFR SERPL CREATININE-BSD FRML MDRD: 83 ML/MIN/{1.73_M2}
GLUCOSE SERPL-MCNC: 128 MG/DL (ref 70–99)
GLUCOSE UR STRIP-MCNC: NEGATIVE MG/DL
HCT VFR BLD AUTO: 41.4 % (ref 35–47)
HGB BLD-MCNC: 13.6 G/DL (ref 11.7–15.7)
HGB UR QL STRIP: NEGATIVE
IMM GRANULOCYTES # BLD: 0 10E9/L (ref 0–0.4)
IMM GRANULOCYTES NFR BLD: 0.4 %
KETONES UR STRIP-MCNC: NEGATIVE MG/DL
LEUKOCYTE ESTERASE UR QL STRIP: NEGATIVE
LIPASE SERPL-CCNC: 125 U/L (ref 73–393)
LYMPHOCYTES # BLD AUTO: 2.4 10E9/L (ref 0.8–5.3)
LYMPHOCYTES NFR BLD AUTO: 23 %
MCH RBC QN AUTO: 28.9 PG (ref 26.5–33)
MCHC RBC AUTO-ENTMCNC: 32.9 G/DL (ref 31.5–36.5)
MCV RBC AUTO: 88 FL (ref 78–100)
MONOCYTES # BLD AUTO: 0.7 10E9/L (ref 0–1.3)
MONOCYTES NFR BLD AUTO: 6.8 %
NEUTROPHILS # BLD AUTO: 7.2 10E9/L (ref 1.6–8.3)
NEUTROPHILS NFR BLD AUTO: 68 %
NITRATE UR QL: NEGATIVE
NRBC # BLD AUTO: 0 10*3/UL
NRBC BLD AUTO-RTO: 0 /100
PH UR STRIP: 6 PH (ref 5–7)
PLATELET # BLD AUTO: 262 10E9/L (ref 150–450)
POTASSIUM SERPL-SCNC: 3.7 MMOL/L (ref 3.4–5.3)
PROT SERPL-MCNC: 7.6 G/DL (ref 6.8–8.8)
RBC # BLD AUTO: 4.71 10E12/L (ref 3.8–5.2)
SODIUM SERPL-SCNC: 142 MMOL/L (ref 133–144)
SOURCE: NORMAL
SP GR UR STRIP: 1 (ref 1–1.03)
UROBILINOGEN UR STRIP-MCNC: 0 MG/DL (ref 0–2)
WBC # BLD AUTO: 10.6 10E9/L (ref 4–11)

## 2020-03-24 PROCEDURE — 81003 URINALYSIS AUTO W/O SCOPE: CPT | Performed by: FAMILY MEDICINE

## 2020-03-24 PROCEDURE — 80053 COMPREHEN METABOLIC PANEL: CPT | Performed by: FAMILY MEDICINE

## 2020-03-24 PROCEDURE — 86140 C-REACTIVE PROTEIN: CPT | Performed by: FAMILY MEDICINE

## 2020-03-24 PROCEDURE — 96374 THER/PROPH/DIAG INJ IV PUSH: CPT | Performed by: FAMILY MEDICINE

## 2020-03-24 PROCEDURE — 99285 EMERGENCY DEPT VISIT HI MDM: CPT | Mod: Z6 | Performed by: FAMILY MEDICINE

## 2020-03-24 PROCEDURE — 99285 EMERGENCY DEPT VISIT HI MDM: CPT | Mod: 25 | Performed by: FAMILY MEDICINE

## 2020-03-24 PROCEDURE — 74176 CT ABD & PELVIS W/O CONTRAST: CPT

## 2020-03-24 PROCEDURE — 83690 ASSAY OF LIPASE: CPT | Performed by: FAMILY MEDICINE

## 2020-03-24 PROCEDURE — 25000128 H RX IP 250 OP 636: Performed by: FAMILY MEDICINE

## 2020-03-24 PROCEDURE — 96375 TX/PRO/DX INJ NEW DRUG ADDON: CPT | Performed by: FAMILY MEDICINE

## 2020-03-24 PROCEDURE — 85025 COMPLETE CBC W/AUTO DIFF WBC: CPT | Performed by: FAMILY MEDICINE

## 2020-03-24 RX ORDER — METRONIDAZOLE 500 MG/1
500 TABLET ORAL 2 TIMES DAILY
Qty: 20 TABLET | Refills: 0 | Status: SHIPPED | OUTPATIENT
Start: 2020-03-24 | End: 2020-04-03

## 2020-03-24 RX ORDER — HYDROCODONE BITARTRATE AND ACETAMINOPHEN 5; 325 MG/1; MG/1
1 TABLET ORAL EVERY 6 HOURS PRN
Qty: 10 TABLET | Refills: 0 | Status: SHIPPED | OUTPATIENT
Start: 2020-03-24 | End: 2020-03-27

## 2020-03-24 RX ORDER — ONDANSETRON 2 MG/ML
4 INJECTION INTRAMUSCULAR; INTRAVENOUS ONCE
Status: COMPLETED | OUTPATIENT
Start: 2020-03-24 | End: 2020-03-24

## 2020-03-24 RX ORDER — KETOROLAC TROMETHAMINE 15 MG/ML
15 INJECTION, SOLUTION INTRAMUSCULAR; INTRAVENOUS ONCE
Status: COMPLETED | OUTPATIENT
Start: 2020-03-24 | End: 2020-03-24

## 2020-03-24 RX ORDER — HYDROMORPHONE HYDROCHLORIDE 1 MG/ML
0.5 INJECTION, SOLUTION INTRAMUSCULAR; INTRAVENOUS; SUBCUTANEOUS
Status: DISCONTINUED | OUTPATIENT
Start: 2020-03-24 | End: 2020-03-24 | Stop reason: HOSPADM

## 2020-03-24 RX ORDER — CIPROFLOXACIN 500 MG/1
500 TABLET, FILM COATED ORAL 2 TIMES DAILY
Qty: 20 TABLET | Refills: 0 | Status: SHIPPED | OUTPATIENT
Start: 2020-03-24 | End: 2020-04-03

## 2020-03-24 RX ADMIN — ONDANSETRON 4 MG: 2 INJECTION INTRAMUSCULAR; INTRAVENOUS at 11:08

## 2020-03-24 RX ADMIN — KETOROLAC TROMETHAMINE 15 MG: 15 INJECTION, SOLUTION INTRAMUSCULAR; INTRAVENOUS at 11:09

## 2020-03-24 RX ADMIN — HYDROMORPHONE HYDROCHLORIDE 0.5 MG: 1 INJECTION, SOLUTION INTRAMUSCULAR; INTRAVENOUS; SUBCUTANEOUS at 11:39

## 2020-03-24 NOTE — DISCHARGE INSTRUCTIONS
You have acute diverticulitis of the colon.  Please see the attached handout.  Begin Cipro and Flagyl twice a day for 10 days.  Stay with a low residue diet as we discussed.  Reserve Vicodin for moderate to severe pain.  Follow-up with your primary care provider in 3 days if not improving.  Return to the emergency department at any time if your symptoms worsen.

## 2020-03-24 NOTE — ED TRIAGE NOTES
Has had abdominal pain, fever, shortness of breath and body ache since Sunday. Does not know of any exposure Covid-19 but there is a possible that someone at her work has been exposed.

## 2020-03-24 NOTE — ED PROVIDER NOTES
[facility] ED Provider Note   Patient: Sun De La Garza  MRN #:  [id number]  Date of Visit: March 24, 2020    CC:     Chief Complaint   Patient presents with     Abdominal Pain     Fever     HPI:  Sun De La Garza is a 59 year old female who presented to the emergency department with abdominal pain that started Sunday evening.  Patient reports that she was just getting over a gastrointestinal illness 2 weeks previous.  She was having some low-grade fevers, lower abdominal pain, and nausea with intermittent diarrhea.  Her symptoms became much Sunday.  Last night she had some dry heaves and took a laxative and was able to get herself cleaned out.  Pain seems to be localizing to the left lower quadrant and is sharp with a pain level of 7/10.  She states that she has been diagnosed with irritable bowel syndrome and also has a prior history of diverticulosis but no known diverticulitis.  She has a history of chronic abdominal pain but this is more intense than normal.  She has had previous right ovary removal, and states that she is menopausal for at least 10 years.  She does not have any current fever this morning, denies sore throat, shortness of breath or cough.  She told the triage nurse that she felt short of breath but she believes that that is from her pain and not from a respiratory illness.    Problem List:  Patient Active Problem List    Diagnosis Date Noted     Irritable bowel syndrome with both constipation and diarrhea 04/19/2018     Priority: Medium     Sleep apnea, unspecified type 09/13/2017     Priority: Medium     IBS (irritable bowel syndrome) 03/16/2016     Priority: Medium     Diverticulosis of large intestine 03/16/2016     Priority: Medium     Vitamin D deficiency 09/05/2014     Priority: Medium     Problem list name updated by automated process. Provider to review       Depression with anxiety 09/05/2014     Priority: Medium      CARDIOVASCULAR SCREENING; LDL GOAL LESS THAN 160 2013     Priority: Medium     Chronic abdominal pain 2013     Priority: Medium     Skin tag 2013     Priority: Medium     Sebaceous cyst 2013     Priority: Medium     head       Internal hemorrhoids 1997     Priority: Medium     Allergic rhinitis 1997     Priority: Medium     Depressive disorder 1997     Priority: Medium     Constipation 1997     Priority: Medium       History reviewed. No pertinent past medical history.    MEDS: ciprofloxacin (CIPRO) 500 MG tablet  HYDROcodone-acetaminophen (NORCO) 5-325 MG tablet  metroNIDAZOLE (FLAGYL) 500 MG tablet  aspirin 81 MG tablet  buPROPion (WELLBUTRIN XL) 300 MG 24 hr tablet  Calcium Carbonate-Vitamin D (CALCIUM 500 + D PO)  dicyclomine (BENTYL) 10 MG capsule  hydrocortisone (ANUSOL-HC) 2.5 % cream  omeprazole (PRILOSEC) 40 MG capsule  Prenatal Vit-Fe Fumarate-FA (PRENATAL VITAMIN PO)        ALLERGIES:  No Known Allergies    Past Surgical History:   Procedure Laterality Date     COLONOSCOPY N/A 2014    Procedure: COLONOSCOPY;  Surgeon: Mj Morales MD;  Location:  GI     ESOPHAGOSCOPY, GASTROSCOPY, DUODENOSCOPY (EGD), COMBINED N/A 2014    Procedure: COMBINED ESOPHAGOSCOPY, GASTROSCOPY, DUODENOSCOPY (EGD), BIOPSY SINGLE OR MULTIPLE;  Surgeon: Mj Morales MD;  Location:  GI     GYN SURGERY      ablation       Social History     Tobacco Use     Smoking status: Former Smoker     Last attempt to quit: 3/4/2012     Years since quittin.0     Smokeless tobacco: Never Used   Substance Use Topics     Alcohol use: No     Comment: socailly     Drug use: No         Review of Systems   Except as noted in HPI, all other systems were reviewed and are negative    Physical Exam     Vitals were reviewed  Patient Vitals for the past 12 hrs:   BP Temp Temp src Heart Rate Resp SpO2 Weight   20 1031 117/80 98.2  F (36.8  C) Oral 89 20 94 % 76.8 kg (169 lb 6.4  oz)     GENERAL APPEARANCE: Alert and oriented x3, moderate distress due to pain  FACE: normal facies  EYES: Pupils are equal; sclerae nonicteric  HENT: normal external exam  NECK: no adenopathy or asymmetry  RESP: normal respiratory effort; clear breath sounds bilaterally  CV: regular rate and rhythm; no significant murmurs, gallops or rubs  ABD: soft, obese, left lower quadrant tenderness; guarding in the left lower quadrant; bowel sounds are present  MS: no gross deformities noted; normal muscle tone.  EXT: No calf tenderness or pitting edema  SKIN: no worrisome rash  NEURO: no facial droop; no focal deficits, speech is normal        Available Lab/Imaging Results     Results for orders placed or performed during the hospital encounter of 03/24/20 (from the past 24 hour(s))   CBC with platelets differential   Result Value Ref Range    WBC 10.6 4.0 - 11.0 10e9/L    RBC Count 4.71 3.8 - 5.2 10e12/L    Hemoglobin 13.6 11.7 - 15.7 g/dL    Hematocrit 41.4 35.0 - 47.0 %    MCV 88 78 - 100 fl    MCH 28.9 26.5 - 33.0 pg    MCHC 32.9 31.5 - 36.5 g/dL    RDW 12.8 10.0 - 15.0 %    Platelet Count 262 150 - 450 10e9/L    Diff Method Automated Method     % Neutrophils 68.0 %    % Lymphocytes 23.0 %    % Monocytes 6.8 %    % Eosinophils 1.3 %    % Basophils 0.5 %    % Immature Granulocytes 0.4 %    Nucleated RBCs 0 0 /100    Absolute Neutrophil 7.2 1.6 - 8.3 10e9/L    Absolute Lymphocytes 2.4 0.8 - 5.3 10e9/L    Absolute Monocytes 0.7 0.0 - 1.3 10e9/L    Absolute Basophils 0.1 0.0 - 0.2 10e9/L    Abs Immature Granulocytes 0.0 0 - 0.4 10e9/L    Absolute Nucleated RBC 0.0    Comprehensive metabolic panel   Result Value Ref Range    Sodium 142 133 - 144 mmol/L    Potassium 3.7 3.4 - 5.3 mmol/L    Chloride 109 94 - 109 mmol/L    Carbon Dioxide 25 20 - 32 mmol/L    Anion Gap 8 3 - 14 mmol/L    Glucose 128 (H) 70 - 99 mg/dL    Urea Nitrogen 11 7 - 30 mg/dL    Creatinine 0.79 0.52 - 1.04 mg/dL    GFR Estimate 83 >60 mL/min/[1.73_m2]     GFR Estimate If Black >90 >60 mL/min/[1.73_m2]    Calcium 8.6 8.5 - 10.1 mg/dL    Bilirubin Total 0.4 0.2 - 1.3 mg/dL    Albumin 3.6 3.4 - 5.0 g/dL    Protein Total 7.6 6.8 - 8.8 g/dL    Alkaline Phosphatase 105 40 - 150 U/L    ALT 25 0 - 50 U/L    AST 14 0 - 45 U/L   CRP inflammation   Result Value Ref Range    CRP Inflammation 25.2 (H) 0.0 - 8.0 mg/L   Lipase   Result Value Ref Range    Lipase 125 73 - 393 U/L   UA reflex to Microscopic   Result Value Ref Range    Color Urine Straw     Appearance Urine Clear     Glucose Urine Negative NEG^Negative mg/dL    Bilirubin Urine Negative NEG^Negative    Ketones Urine Negative NEG^Negative mg/dL    Specific Gravity Urine 1.003 1.003 - 1.035    Blood Urine Negative NEG^Negative    pH Urine 6.0 5.0 - 7.0 pH    Protein Albumin Urine Negative NEG^Negative mg/dL    Urobilinogen mg/dL 0.0 0.0 - 2.0 mg/dL    Nitrite Urine Negative NEG^Negative    Leukocyte Esterase Urine Negative NEG^Negative    Source Midstream Urine    Abd/pelvis CT - no contrast - Stone Protocol    Narrative    CT ABDOMEN AND PELVIS WITHOUT CONTRAST  3/24/2020 12:21 PM     HISTORY: Left lower quadrant abdominal pain.     COMPARISON: CT abdomen and pelvis 10/26/2016.    TECHNIQUE: Axial images are obtained from the lung bases to the  symphysis without oral or IV contrast.  Coronal reformatted images are  also generated.  Radiation dose for this scan was reduced using  automated exposure control, adjustment of the mA and/or kV according  to patient size, or iterative reconstruction technique.    FINDINGS:  The lung bases are clear.    Abdomen: Allowing for the noncontrast technique, the liver, spleen,  gallbladder, pancreas, adrenal glands and kidneys are unremarkable. No  evidence of hydronephrosis or urinary tract calculi. The bowel is  normal in caliber without obstruction or appendicitis, but there is  colonic wall thickening and focal inflammation in the proximal sigmoid  colon on series 3, image 187  consistent with acute diverticulitis. No  associated abscess or free intraperitoneal air. No enlarged lymph  nodes.    Pelvis: The bladder is decompressed. The uterus and rectum are  unremarkable. No adnexal mass. Bone window examination shows no  destructive bone lesions. Osteopenia is suspected.      Impression    IMPRESSION:  1. Acute diverticulitis proximal sigmoid colon without evidence of  associated abscess or free intraperitoneal air.  2. No urinary tract calculi or hydronephrosis.                Impression     Final diagnoses:   Diverticulitis of colon   Abdominal pain, left lower quadrant         ED Course & Medical Decision Making   Sun De La Garza is a 59 year old female who presented to the emergency department with abdominal pain that started Sunday evening and was preceded by 2 weeks of GI symptoms that she thought was related to a stomach flu.  She has had low-grade fevers, left lower quadrant abdominal pain, nausea, and intermittent diarrhea.  Since taking some laxatives last night, she was able to empty her bowels more completely.  Patient presents with sharp pain in the left lower quadrant rated 7/10.  She was seen shortly after arrival.  Temperature is 98.2, blood pressure 117/80, heart rate of 89, respiratory rate of 20.  Oxygen saturations are 94%.  Exam reveals left lower quadrant tenderness with some mild rebound and guarding.  Laboratory work-up reveals a normal CBC without left shift.  Comprehensive metabolic panel is normal except for a nonfasting glucose that is elevated at 128.  CRP is elevated at 25.2.  Lipase level is normal.  Urinalysis was unremarkable.  CT scan of the abdomen reveals acute diverticulitis of the proximal sigmoid colon without evidence of associated abscess or free intraperitoneal air.  Patient received IV fluids, Toradol, and Dilaudid for breakthrough pain.  She felt more comfortable.  We discussed the findings of her scan.  Patient was advised to begin a low residue  diet, and start Cipro and Flagyl twice a day for 10 days.  Reserve Vicodin for moderate to severe breakthrough pain.  Follow-up with her primary care provider in 3 days if not improving.  Return to the ED at any time if she develops new or worsening symptoms.  She requested a work note and I wrote a note excusing her for work for the next 3 days through Thursday.           Written after-visit summary and instructions were given at the time of discharge.    Follow up Plan:   Denise Schulte PA-C  [address] [place] [address] [phone]    In 3 days  if not improving    [facility] Emergency Department  [address] [place] [address] [phone]    If symptoms worsen      Discharge Medications: Cipro, Flagyl, Vicodin         Disclaimer: This note consists of words and symbols derived from keyboarding and dictation using voice recognition software.  As a result, there may be errors that have gone undetected.  Please consider this when interpreting information found in this note.       Luz Fontana MD  03/24/20 1256

## 2020-03-24 NOTE — LETTER
[facility]  Emergency Room  [address]  [place].   [address]  Tel: [phone]   Fax: [phone]  2020    Sun De La Garza  [address] [id number] [phone] (home) [phone] (work)    : 1960          To Whom it May Concern:    Sun De La Garza was seen in our ER today, 2020. I expect her condition to improve over the next 5-7 days.  She will miss work for the next 3 days, but may return to work, without restriction, when improved. If not improved during the above expected time period,  then I have encouraged her to be rechecked in her clinic for further evaluation.      Please contact me for questions or concerns.    Sincerely,       Skip Fontana MD

## 2020-03-24 NOTE — ED AVS SNAPSHOT
[facility] Emergency Department  [address] [place] [address]  Phone:  [phone]  Fax:  [phone]                                    Sun De La Garza   MRN: [id number]    Department:  [facility] Emergency Department   Date of Visit:  3/24/2020           After Visit Summary Signature Page    I have received my discharge instructions, and my questions have been answered. I have discussed any challenges I see with this plan with the nurse or doctor.    ..........................................................................................................................................  Patient/Patient Representative Signature      ..........................................................................................................................................  Patient Representative Print Name and Relationship to Patient    ..................................................               ................................................  Date                                   Time    ..........................................................................................................................................  Reviewed by Signature/Title    ...................................................              ..............................................  Date                                               Time          22EPIC Rev 08/18

## 2020-03-27 DIAGNOSIS — F41.8 DEPRESSION WITH ANXIETY: ICD-10-CM

## 2020-03-27 NOTE — LETTER
[facility]  [address] [address]  Phone: [phone]        April 3, 2020      Sun De La Garza                           [address] [address]            Dear Ms. De La Garza,    We are concerned about your health care.  We recently provided you with a medication refill.  Many medications require routine follow-up with your Doctor.      At this time we ask that: You schedule a telephone visit with your physician to follow your medications.    Your prescription: Cannot be refilled until the above noted follow up has been completed.      Thank you,      Your [facility] Team

## 2020-03-30 ENCOUNTER — TELEPHONE (OUTPATIENT)
Dept: FAMILY MEDICINE | Facility: OTHER | Age: 60
End: 2020-03-30

## 2020-03-30 NOTE — TELEPHONE ENCOUNTER
Patient called clinic back. She stated that she hasn't stopped taking this medication. She has been taking this continuously. Her insurance changed and she thinks that might have been the issue.

## 2020-03-30 NOTE — TELEPHONE ENCOUNTER
Reason for Call:  Form, our goal is to have forms completed with 72 hours, however, some forms may require a visit or additional information.    Type of letter, form or note:  medical    Who is the form from?: Insurance comp    Where did the form come from: Patient or family brought in       What clinic location was the form placed at?: [facility] - [phone]    Where the form was placed: box Box/Folder    What number is listed as a contact on the form?: Fax [phone]       Additional comments: Please fax completed copy and mail one to patient    Call taken on 3/30/2020 at 3:17 PM by Ophelia Rogers

## 2020-03-30 NOTE — TELEPHONE ENCOUNTER
Pending Prescriptions:                       Disp   Refills    buPROPion (WELLBUTRIN XL) 300 MG 24 hr ta*30 tab*0            Sig: Take 1 tablet (300 mg) by mouth every morning    Last Written Prescription Date:  12/19/2019  Last Fill Quantity: 30,  # refills: 0   Last office visit: 12/11/2019 with prescribing provider:     Future Office Visit:        Please call patient. Please ask patient if she has stopped this medication and is now wanting to restart?     Arin Valencia RN BSN

## 2020-03-31 NOTE — TELEPHONE ENCOUNTER
Form filled out to the best of my ability. Form given to provider to complete and sign. Pt will need to sign form.    Leeann France CMA ([facility])

## 2020-03-31 NOTE — TELEPHONE ENCOUNTER
Left message for pt to return call, when call is returned give information below and help schedule a phone visit for forms and med check.    Leeann France CMA ([facility])

## 2020-03-31 NOTE — TELEPHONE ENCOUNTER
Left message for pt to return call, when call is returned give information below and help schedule a phone visit for forms and med check.    Leeann France CMA ([facility])

## 2020-04-01 NOTE — TELEPHONE ENCOUNTER
LMTC, please see message below and assist in scheduling telephone/video appt for completion of forms and medication refill   Thanks  Ivory Garcia RT (R)

## 2020-04-02 NOTE — TELEPHONE ENCOUNTER
Millicent Mart MA 4 hours ago (10:07 AM)          Left message to return call. If call is returned, please see message below and assist with scheduling     Millicent Mart MA

## 2020-04-02 NOTE — TELEPHONE ENCOUNTER
Left message to return call. If call is returned, please see message below and assist with scheduling    Millicent Mart MA

## 2020-04-03 RX ORDER — BUPROPION HYDROCHLORIDE 300 MG/1
300 TABLET ORAL EVERY MORNING
Qty: 30 TABLET | Refills: 0 | Status: SHIPPED | OUTPATIENT
Start: 2020-04-03 | End: 2020-04-20

## 2020-04-03 NOTE — TELEPHONE ENCOUNTER
4th attempt to contact patient. Will close encounter.   Cadence Rodarte MA on 4/3/2020 at 10:49 AM

## 2020-04-03 NOTE — TELEPHONE ENCOUNTER
Multiple attempts made with no return call. Will send letter.  Provider please remove meds and close.    Leeann France CMA ([facility])

## 2020-04-15 NOTE — TELEPHONE ENCOUNTER
Patient returning message below. Informed patient about the message for a phone visit to complete forms and medication Check. Patient is calling her insurance to see if they cover phone visits and give us a call back.

## 2020-04-15 NOTE — TELEPHONE ENCOUNTER
Left message for pt to return call, when call is returned give information below and schedule visit.      Leeann France CMA ([facility])

## 2020-04-15 NOTE — TELEPHONE ENCOUNTER
We need to continue reaching out to her per Denise to schedule a visit to complete this form. Form is in her forms box above her desk.    Leeann France CMA ([facility])

## 2020-04-16 NOTE — PROGRESS NOTES
"Sun De La Garza is a 59 year old female who is being evaluated via a billable video visit.      The patient has been notified of following:     \"This video visit will be conducted via a call between you and your physician/provider. We have found that certain health care needs can be provided without the need for an in-person physical exam.  This service lets us provide the care you need with a video conversation.  If a prescription is necessary we can send it directly to your pharmacy.  If lab work is needed we can place an order for that and you can then stop by our lab to have the test done at a later time.    Video visits are billed at different rates depending on your insurance coverage.  Please reach out to your insurance provider with any questions.    If during the course of the call the physician/provider feels a video visit is not appropriate, you will not be charged for this service.\"    Patient has given verbal consent for Video visit? Yes    How would you like to obtain your AVS?     Patient would like the video invitation sent by: Text to cell phone: [phone]    Subjective     Sun De La Garza is a 59 year old female who presents to clinic today for the following health issues:      Today we are completing a form for her ER visit for diverticulitis.  She has a history of irritable bowel syndrome.  She has flares episodically approximately once a month.  These flares last for up to 2 to 3 days.  She works at [employer] making [employer] Pre[employer], when she is having her IBS, she needs to lay down due to the severe cramping and having easy quick access to a bathroom as she has diarrhea.  With her recent diagnosis of diverticulitis, she wonders if what she thought was irritable bowel syndrome has actually been diverticulitis.  She has known diverticulosis.  She was absent from work due to this March 24 returning to work March 30.  She completed her 10 days of antibiotics and pain meds and has done " well since.  She has been losing weight with improving her diet.  She does use dicyclomine very rarely and does not need this refilled today.      Depression has been well controlled.  She continues to use Wellbutrin.  She has no side effects or issues with taking this medication.  She is needing it refilled.    Video Start Time: 14:54        Current Outpatient Medications   Medication Sig Dispense Refill     aspirin 81 MG tablet Take 1 tablet (81 mg) by mouth daily 30 tablet      buPROPion (WELLBUTRIN XL) 300 MG 24 hr tablet Take 1 tablet (300 mg) by mouth every morning 90 tablet 1     Calcium Carbonate-Vitamin D (CALCIUM 500 + D PO)        hydrocortisone (ANUSOL-HC) 2.5 % cream Place rectally 2 times daily 90 g 1     omeprazole (PRILOSEC) 40 MG DR capsule Take 1 capsule (40 mg) by mouth daily Take 30-60 minutes before a meal. 90 capsule 3     Prenatal Vit-Fe Fumarate-FA (PRENATAL VITAMIN PO) Take 1 tablet by mouth daily       dicyclomine (BENTYL) 10 MG capsule TAKE 1 CAPSULE FOUR TIMES A DAY BEFORE MEALS AND NIGHTLY (Patient not taking: Reported on 12/11/2019) 240 capsule 0     BP Readings from Last 3 Encounters:   03/24/20 111/89   10/14/19 126/78   05/29/19 129/82    Wt Readings from Last 3 Encounters:   03/24/20 76.8 kg (169 lb 6.4 oz)   10/14/19 81.6 kg (180 lb)   05/29/19 77.1 kg (170 lb)                    Reviewed and updated as needed this visit by Provider         Review of Systems   ROS COMP: CONSTITUTIONAL: NEGATIVE for fever, chills, change in weight  CONSTITUTIONAL: She is losing weight though this is intentional due to dietary changes  ENT/MOUTH: NEGATIVE for ear, mouth and throat problems  RESP: NEGATIVE for significant cough or SOB  CV: NEGATIVE for chest pain, palpitations or peripheral edema  GI: NEGATIVE for nausea, abdominal pain, heartburn, or change in bowel habits and omeprazole is used routinely for her reflux,with good results   PSYCHIATRIC: See PHQ 9 and MARIAN 7 questionnaires for symptoms.  "      Objective    There were no vitals taken for this visit.  Estimated body mass index is 30.01 kg/m  as calculated from the following:    Height as of 10/14/19: 1.6 m (5' 3\").    Weight as of 3/24/20: 76.8 kg (169 lb 6.4 oz).  Physical Exam   GENERAL: healthy, alert and no distress  PSYCH: mentation appears normal, affect normal/bright    Diagnostic Test Results:  Labs reviewed in Epic  none         Assessment & Plan   This visit was completed virtually due to our current COVID 19 pandemic.  The patient will be seen as soon as possible in the office.  If there is any significant change in or worsening of symptoms patient will call in to be triaged, present to the emergency department, or call 911 if acute worsening is noted.    1. Gastroesophageal reflux disease with esophagitis  Stable, continue to use omeprazole, she will try to taper her use down to as needed if she tolerates it  - omeprazole (PRILOSEC) 40 MG DR capsule; Take 1 capsule (40 mg) by mouth daily Take 30-60 minutes before a meal.  Dispense: 90 capsule; Refill: 3    2. Depression with anxiety  Stable, she will continue bupropion at the current dosage recheck 6 months  - buPROPion (WELLBUTRIN XL) 300 MG 24 hr tablet; Take 1 tablet (300 mg) by mouth every morning  Dispense: 90 tablet; Refill: 1    3. Diverticulitis-FMLA form completed will be copied and scanned, pt requests copy mailed to her  Her symptoms have resolved f/u prn        BMI:   Estimated body mass index is 30.01 kg/m  as calculated from the following:    Height as of 10/14/19: 1.6 m (5' 3\").    Weight as of 3/24/20: 76.8 kg (169 lb 6.4 oz).   Weight management plan: she is already losing weight with improved diet        This chart documentation was completed in part with Dragon voice recognition software.  Documentation is reviewed after completion, however, some words and grammatical errors may remain.  Denise Schulte PA-C      Return in about 6 months (around 10/20/2020) for " depression/anxiety.    Denise Schulte PA-C  [facility]      Video-Visit Details    Type of service:  Video Visit    Video End Time (time video stopped): 15:09    Originating Location (pt. Location): Home    Distant Location (provider location):  [facility]     Mode of Communication:  Video Conference via [employer]    Return in about 6 months (around 10/20/2020) for depression/anxiety.       Denise Schulte PA-C

## 2020-04-20 ENCOUNTER — VIRTUAL VISIT (OUTPATIENT)
Dept: FAMILY MEDICINE | Facility: OTHER | Age: 60
End: 2020-04-20
Payer: COMMERCIAL

## 2020-04-20 DIAGNOSIS — F41.8 DEPRESSION WITH ANXIETY: ICD-10-CM

## 2020-04-20 DIAGNOSIS — K57.92 DIVERTICULITIS: Primary | ICD-10-CM

## 2020-04-20 DIAGNOSIS — K21.00 GASTROESOPHAGEAL REFLUX DISEASE WITH ESOPHAGITIS: ICD-10-CM

## 2020-04-20 PROCEDURE — 99214 OFFICE O/P EST MOD 30 MIN: CPT | Mod: 95 | Performed by: PHYSICIAN ASSISTANT

## 2020-04-20 RX ORDER — OMEPRAZOLE 40 MG/1
40 CAPSULE, DELAYED RELEASE ORAL DAILY
Qty: 90 CAPSULE | Refills: 3 | Status: SHIPPED | OUTPATIENT
Start: 2020-04-20 | End: 2021-03-23

## 2020-04-20 RX ORDER — BUPROPION HYDROCHLORIDE 300 MG/1
300 TABLET ORAL EVERY MORNING
Qty: 90 TABLET | Refills: 1 | Status: SHIPPED | OUTPATIENT
Start: 2020-04-20 | End: 2020-09-27

## 2020-04-20 ASSESSMENT — ANXIETY QUESTIONNAIRES
2. NOT BEING ABLE TO STOP OR CONTROL WORRYING: NOT AT ALL
1. FEELING NERVOUS, ANXIOUS, OR ON EDGE: NOT AT ALL
5. BEING SO RESTLESS THAT IT IS HARD TO SIT STILL: NOT AT ALL
6. BECOMING EASILY ANNOYED OR IRRITABLE: NOT AT ALL
3. WORRYING TOO MUCH ABOUT DIFFERENT THINGS: NOT AT ALL
GAD7 TOTAL SCORE: 0
IF YOU CHECKED OFF ANY PROBLEMS ON THIS QUESTIONNAIRE, HOW DIFFICULT HAVE THESE PROBLEMS MADE IT FOR YOU TO DO YOUR WORK, TAKE CARE OF THINGS AT HOME, OR GET ALONG WITH OTHER PEOPLE: NOT DIFFICULT AT ALL
7. FEELING AFRAID AS IF SOMETHING AWFUL MIGHT HAPPEN: NOT AT ALL

## 2020-04-20 ASSESSMENT — PATIENT HEALTH QUESTIONNAIRE - PHQ9
SUM OF ALL RESPONSES TO PHQ QUESTIONS 1-9: 1
5. POOR APPETITE OR OVEREATING: NOT AT ALL

## 2020-04-21 ASSESSMENT — ANXIETY QUESTIONNAIRES: GAD7 TOTAL SCORE: 0

## 2020-09-26 DIAGNOSIS — F41.8 DEPRESSION WITH ANXIETY: ICD-10-CM

## 2020-09-26 NOTE — LETTER
[facility]  [address]  [place] [id number] [phone]        September 30, 2020    Sun De La Garza                                                                                                                     [address] [address]        Dear Sun,    We have attempted to contact you by telephone without success.  We have sent a one time refill for the medication buPROPion (WELLBUTRIN XL) 300 MG 24 hr tablet   to the pharmacy for you.  In order to continue refilling this medication, we request that you schedule an appointment with your Provider.  You can call us at [phone] to schedule this appointment in [place] or [phone] to schedule this appointment in [place].    Thank you,  [facility] Support Staff / Mary

## 2020-09-27 RX ORDER — BUPROPION HYDROCHLORIDE 300 MG/1
TABLET ORAL
Qty: 90 TABLET | Refills: 0 | Status: SHIPPED | OUTPATIENT
Start: 2020-09-27 | End: 2020-11-27

## 2020-09-27 NOTE — TELEPHONE ENCOUNTER
Medication is being filled for 1 time refill only due to:  Patient needs to be seen because due for med check\.   Please assist patient in scheduling appt. Huong given.  Deedee Sifuentes RN

## 2020-11-19 DIAGNOSIS — F41.8 DEPRESSION WITH ANXIETY: ICD-10-CM

## 2020-11-20 NOTE — TELEPHONE ENCOUNTER
"Requested Prescriptions   Pending Prescriptions Disp Refills     buPROPion (WELLBUTRIN XL) 300 MG 24 hr tablet [Pharmacy Med Name: BUPROPION  300MG  TAB  XL 24 HR] 90 tablet 3     Sig: TAKE 1 TABLET BY MOUTH IN  THE MORNING       SSRIs Protocol Failed - 11/19/2020  5:21 AM        Failed - PHQ-9 score less than 5 in past 6 months     PHQ-9 score:    PHQ 4/20/2020   PHQ-9 Total Score 1   Q9: Thoughts of better off dead/self-harm past 2 weeks Not at all               Failed - Recent (6 mo) or future (30 days) visit within the authorizing provider's specialty     Patient had office visit in the last 6 months or has a visit in the next 30 days with authorizing provider or within the authorizing provider's specialty.  See \"Patient Info\" tab in inbasket, or \"Choose Columns\" in Meds & Orders section of the refill encounter.            Passed - Medication is Bupropion     If the medication is Bupropion (Wellbutrin), and the patient is taking for smoking cessation; OK to refill.          Passed - Medication is active on med list        Passed - Patient is age 18 or older        Passed - No active pregnancy on record        Passed - No positive pregnancy test in last 12 months             Routing refill request to provider for review/approval because:  Patient needs to be seen because:  6 month follow up, PHQ-9 older than 6 months.    Mark Page, RN, BSN          "

## 2020-11-23 NOTE — TELEPHONE ENCOUNTER
Due for a med check ok for in person or virtual I can refill if she will run out before visit  Denise Schulte PA-C

## 2020-11-25 NOTE — PROGRESS NOTES
"Sun De La Garza is a 60 year old female who is being evaluated via a billable telephone visit.      The patient has been notified of following:     \"This telephone visit will be conducted via a call between you and your physician/provider. We have found that certain health care needs can be provided without the need for a physical exam.  This service lets us provide the care you need with a short phone conversation.  If a prescription is necessary we can send it directly to your pharmacy.  If lab work is needed we can place an order for that and you can then stop by our lab to have the test done at a later time.    Telephone visits are billed at different rates depending on your insurance coverage. During this emergency period, for some insurers they may be billed the same as an in-person visit.  Please reach out to your insurance provider with any questions.    If during the course of the call the physician/provider feels a telephone visit is not appropriate, you will not be charged for this service.\"    Patient has given verbal consent for Telephone visit?  Yes    What phone number would you like to be contacted at? [phone]    How would you like to obtain your AVS? Mail a copy    Subjective     Sun De La Garza is a 60 year old female who presents via phone visit today for the following health issues:    HPI     Depression and Anxiety Follow-Up    How are you doing with your depression since your last visit? Worsened, her father passed away due to Parkinson's disease.  She feels guilty has been working so much over time, it is mandatory overtime the only get 1 week and off a month.  She chose to stay home a few of those weekends and feels that she should have seen her father.  She is thankful he did not suffer.  They were able to have the  prior to shutting down with the next surge of Covid.  She feels that her dosages of Wellbutrin is adequate.  She does toss and turn and have a hard time sleeping but " prefers this to taking any more pills.  She did take one of her dad's lorazepam's which really knocked her out.  She did not like it and has not taken it again.  She has a lot of stress with work which does not help.  Patient states she is not having any issues with eating, in fact she is getting back on track with weight watchers    How are you doing with your anxiety since your last visit?  Worsened     Are you having other symptoms that might be associated with depression or anxiety? Yes:  not sleeping great    Have you had a significant life event? Grief or Loss Dad passed away on 2020    Do you have any concerns with your use of alcohol or other drugs? No    Social History     Tobacco Use     Smoking status: Former Smoker     Quit date: 3/4/2012     Years since quittin.7     Smokeless tobacco: Never Used   Substance Use Topics     Alcohol use: No     Comment: socailly     Drug use: No     PHQ 2019   PHQ-9 Total Score 4 1 9   Q9: Thoughts of better off dead/self-harm past 2 weeks Not at all Not at all Not at all     MARIAN-7 SCORE 2019   Total Score - - -   Total Score - - -   Total Score 3 0 2     Last PHQ-9 2020   1.  Little interest or pleasure in doing things 2   2.  Feeling down, depressed, or hopeless 2   3.  Trouble falling or staying asleep, or sleeping too much 2   4.  Feeling tired or having little energy 2   5.  Poor appetite or overeating 0   6.  Feeling bad about yourself 1   7.  Trouble concentrating 0   8.  Moving slowly or restless 0   Q9: Thoughts of better off dead/self-harm past 2 weeks 0   PHQ-9 Total Score 9   Difficulty at work, home, or with people Very difficult     MARIAN-7  2020   1. Feeling nervous, anxious, or on edge 1   2. Not being able to stop or control worrying 1   3. Worrying too much about different things 0   4. Trouble relaxing 0   5. Being so restless that it is hard to sit still 0   6. Becoming easily  annoyed or irritable 0   7. Feeling afraid, as if something awful might happen 0   MARIAN-7 Total Score 2   If you checked any problems, how difficult have they made it for you to do your work, take care of things at home, or get along with other people? Somewhat difficult       Suicide Assessment Five-step Evaluation and Treatment (SAFE-T)       Review of Systems   See PHQ 9 and MARIAN 7 questionnaires for symptoms.        Objective          Vitals:  No vitals were obtained today due to virtual visit.    healthy, alert and no distress  PSYCH: Alert and oriented times 3; coherent speech, normal   rate and volume, able to articulate logical thoughts, able   to abstract reason, no tangential thoughts, no hallucinations   or delusions  Her affect is normal and pleasant tearful only briefly when speaking of her guilt regarding not seeing her father  RESP: No cough, no audible wheezing, able to talk in full sentences  Remainder of exam unable to be completed due to telephone visits            Assessment/Plan:    Assessment & Plan         Depression with anxiety  Currently slightly exacerbated due to grief, she will continue with her current meds, encouraged healthy diet and exercise, relaxing bedtime routine and trial of melatonin if she continues not to sleep well.  She will contact me at once if she is having any worsening or changes of her symptoms  - buPROPion (WELLBUTRIN XL) 300 MG 24 hr tablet; TAKE 1 TABLET BY MOUTH IN  THE MORNING            Return in about 6 months (around 6/2/2021) for depression/anxiety.    Denise Schulte PA-C  [facility]    Phone call duration:  15 minutes

## 2020-11-27 RX ORDER — BUPROPION HYDROCHLORIDE 300 MG/1
TABLET ORAL
Qty: 90 TABLET | Refills: 0 | Status: SHIPPED | OUTPATIENT
Start: 2020-11-27 | End: 2020-12-02

## 2020-11-27 NOTE — TELEPHONE ENCOUNTER
Medicare Wellness Visit  Plan for Preventive Care    A good way for you to stay healthy is to use preventive care.  Medicare covers many services that can help you stay healthy.* The goal of these services is to find any health problems as quickly as possible. Finding problems early can help make them easier to treat.  Your personal plan below lists the services you may need and when they are due.     Health Maintenance Summary     Shingles Vaccine (2 of 3)  Overdue since 5/7/2012    Lung Cancer Screening (Yearly)  Overdue since 1/25/2020    Influenza Vaccine (1)  Overdue since 9/1/2020    Medicare Wellness Visit (Yearly)  Due since 11/25/2020    Breast Cancer Screening (Every 2 Years)  Order placed this encounter    DTaP/Tdap/Td Vaccine (3 - Td)  Next due on 3/28/2024    Colonoscopy Risk (Every 5 Years)  Next due on 1/31/2025    Hepatitis C Screening   Completed    Osteoporosis Screening   Completed    Meningococcal Vaccine   Aged Out    HPV Vaccine   Aged Out           Preventive Care for Women and Men    Heart Screenings (Cardiovascular):  · Blood tests are used to check your cholesterol, lipid and triglyceride levels. High levels can increase your risk for heart disease and stroke. High levels can be treated with medications, diet and exercise. Lowering your levels can help keep your heart and blood vessels healthy.  Your provider will order these tests if they are needed.    · An ultrasound is done to see if you have an abdominal aortic aneurysm (AAA).  This is an enlargement of one of the main blood vessels that delivers blood to the body.   In the [place], 9,000 deaths are caused by AAA.  You may not even know you have this problem and as many as 1 in 3 people will have a serious problem if it is not treated.  Early diagnosis allows for more effective treatment and cure.  If you have a family history of AAA or are a male age 65-75 who has smoked, you are at higher risk of an AAA.  Your provider can  Patient is scheduled for 12/2/20.    order this test, if needed.    Colorectal Screening:  · There are many tests that are used to check for cancer of your colon and rectum. You and your provider should discuss what test is best for you and when to have it done.  Options include:  · Screening Colonoscopy: exam of the entire colon, seen through a flexible lighted tube.  · Flexible Sigmoidoscopy: exam of the last third (sigmoid portion) of the colon and rectum, seen through a flexible lighted tube.  · Cologuard DNA stool test: a sample of your stool is used to screen for cancer and unseen blood in your stool.  · Fecal Occult Blood Test: a sample of your stool is studied to find any unseen blood    Flu Shot:  · An immunization that helps to prevent influenza (the flu). You should get this every year. The best time to get the shot is in the fall.    Pneumococcal Shot:  • Vaccines are available that can help prevent pneumococcal disease, which is any type of infection caused by Streptococcus pneumoniae bacteria.   Their use can prevent some cases of pneumonia, meningitis, and sepsis. There are two types of pneumococcal vaccines:   o Conjugate vaccines (PCV-13 or Prevnar 13®) - helps protect against the 13 types of pneumococcal bacteria that are the most common causes of serious infections in children and adults.    o Polysaccharide vaccine (PPSV23 or Ucbvwrlgw21®) - helps protect against 23 types of pneumococcal bacteria for patients who are recommended to get it.  These vaccines should be given at least 12 months apart.  A booster is usually not needed.     Hepatitis B Shot:  · An immunization that helps to protect people from getting Hepatitis B. Hepatitis B is a virus that spreads through contact with infected blood or body fluids. Many people with the virus do not have symptoms.  The virus can lead to serious problems, such as liver disease. Some people are at higher risk than others. Your doctor will tell you if you need this shot.     Diabetes  Screening:  · A test to measure sugar (glucose) in your blood is called a fasting blood sugar. Fasting means you cannot have food or drink for at least 8 hours before the test. This test can detect diabetes long before you may notice symptoms.    Glaucoma Screening:  · Glaucoma screening is performed by your eye doctor. The test measures the fluid pressure inside your eyes to determine if you have glaucoma.     Hepatitis C Screening:  · A blood test to see if you have the hepatitis C virus.  Hepatitis C attacks the liver and is a major cause of chronic liver disease.  Medicare will cover a single screening for all adults born between 1945 & 1965, or high risk patients (people who have injected illegal drugs or people who have had blood transfusions).  High risk patients who continue to inject illegal drugs can be screened for Hepatitis C every year.    Smoking and Tobacco-Use Cessation Counseling:  · Tobacco is the single greatest cause of disease and early death in our country today. Medication and counseling together can increase a person’s chance of quitting for good.   · Medicare covers two quitting attempts per year, with four counseling sessions per attempt (eight sessions in a 12 month period)    Preventive Screening tests for Women    Screening Mammograms and Breast Exams:  · An x-ray of your breasts to check for breast cancer before you or your doctor may be able to feel it.  If breast cancer is found early it can usually be treated with success.    Pelvic Exams and Pap Tests:  · An exam to check for cervical and vaginal cancer. A Pap test is a lab test in which cells are taken from your cervix and sent to the lab to look for signs of cervical cancer. If cancer of the cervix is found early, chances for a cure are good. Testing can generally end at age 65, or if a woman has a hysterectomy for a benign condition. Your provider may recommend more frequent testing if certain abnormal results are found.    Bone  Mass Measurements:  · A painless x-ray of your bone density to see if you are at risk for a broken bone. Bone density refers to the thickness of bones or how tightly the bone tissue is packed.    Preventive Screening tests for Men    Prostate Screening:  · Should you have a prostate cancer test (PSA)?  It is up to you to decide if you want a prostate cancer test. Talk to your clinician to find out if the test is right for you.  Things for you to consider and talk about should include:  · Benefits and harms of the test  · Your family history  · How your race/ethnicity may influence the test  · If the test may impact other medical conditions you have  · Your values on screenings and treatments    *Medicare pays for many preventive services to keep you healthy. For some of these services, you might have to pay a deductible, coinsurance, and / or copayment.  The amounts vary depending on the type of services you need and the kind of Medicare health plan you have.

## 2020-12-02 ENCOUNTER — VIRTUAL VISIT (OUTPATIENT)
Dept: FAMILY MEDICINE | Facility: OTHER | Age: 60
End: 2020-12-02
Payer: COMMERCIAL

## 2020-12-02 DIAGNOSIS — F41.8 DEPRESSION WITH ANXIETY: Primary | ICD-10-CM

## 2020-12-02 PROCEDURE — 99213 OFFICE O/P EST LOW 20 MIN: CPT | Mod: 95 | Performed by: PHYSICIAN ASSISTANT

## 2020-12-02 PROCEDURE — 96127 BRIEF EMOTIONAL/BEHAV ASSMT: CPT | Performed by: PHYSICIAN ASSISTANT

## 2020-12-02 RX ORDER — BUPROPION HYDROCHLORIDE 300 MG/1
TABLET ORAL
Qty: 90 TABLET | Refills: 3 | Status: SHIPPED | OUTPATIENT
Start: 2020-12-02 | End: 2021-12-08

## 2020-12-02 ASSESSMENT — ANXIETY QUESTIONNAIRES
IF YOU CHECKED OFF ANY PROBLEMS ON THIS QUESTIONNAIRE, HOW DIFFICULT HAVE THESE PROBLEMS MADE IT FOR YOU TO DO YOUR WORK, TAKE CARE OF THINGS AT HOME, OR GET ALONG WITH OTHER PEOPLE: SOMEWHAT DIFFICULT
1. FEELING NERVOUS, ANXIOUS, OR ON EDGE: SEVERAL DAYS
6. BECOMING EASILY ANNOYED OR IRRITABLE: NOT AT ALL
GAD7 TOTAL SCORE: 2
7. FEELING AFRAID AS IF SOMETHING AWFUL MIGHT HAPPEN: NOT AT ALL
2. NOT BEING ABLE TO STOP OR CONTROL WORRYING: SEVERAL DAYS
5. BEING SO RESTLESS THAT IT IS HARD TO SIT STILL: NOT AT ALL
3. WORRYING TOO MUCH ABOUT DIFFERENT THINGS: NOT AT ALL

## 2020-12-02 ASSESSMENT — PATIENT HEALTH QUESTIONNAIRE - PHQ9
5. POOR APPETITE OR OVEREATING: NOT AT ALL
SUM OF ALL RESPONSES TO PHQ QUESTIONS 1-9: 9

## 2020-12-03 ASSESSMENT — ANXIETY QUESTIONNAIRES: GAD7 TOTAL SCORE: 2

## 2021-01-21 ENCOUNTER — TELEPHONE (OUTPATIENT)
Dept: FAMILY MEDICINE | Facility: OTHER | Age: 61
End: 2021-01-21

## 2021-01-21 NOTE — TELEPHONE ENCOUNTER
Reason for Call:  Form, our goal is to have forms completed with 72 hours, however, some forms may require a visit or additional information.    Type of letter, form or note:  RED    Who is the form from?: [employer] (if other please explain)    Where did the form come from: form was faxed in    What clinic location was the form placed at?: [facility] - [phone]    Where the form was placed: [place] Team B Box/Folder    What number is listed as a contact on the form?: [phone]       Additional comments:  Fax [phone]    Call taken on 1/21/2021 at 10:24 AM by Elida Valle

## 2021-01-26 NOTE — TELEPHONE ENCOUNTER
Please call pt - is her form for IBS or depression? If she is ok with it, we can do a phone visit to complete  Denise Schulte PA-C

## 2021-01-27 NOTE — TELEPHONE ENCOUNTER
Patient called back and wanted to check with billing before scheduling. Her last phone visit cost over 200$. She will call after she checks on that to schedule either in clinic or phone.

## 2021-02-08 NOTE — PROGRESS NOTES
Assessment & Plan     Irritable bowel syndrome with both constipation and diarrhea  Improving with dietary changes.  She continues to use dicyclomine sparingly does not need a refill at this time but may have 1 as needed    History of diverticulitis  No symptoms currently, will improve her diet as she is in follow-up as needed    Encounter for completion of form with patient  Form completed, will fax copy and scanned today                   No follow-ups on file.    Denise Schulte PA-C  [facility] VINAY Garsia is a 60 year old who presents to clinic today for the following health issues     HPI       Pt presents to clinic as directed to discuss/fill out  la paperwork.     Patient has a history of irritable bowel syndrome since her teens or early adulthood.  She has been diagnosed with diverticulitis though thankfully has not had any ER visits or hospitalizations since March 2020 in regards to this.  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  She works in a sterile setting and is unable to have uncontrollable diarrhea in the workplace.  When she is having flares of her IBS or diverticulitis, she is unable to work because she works in a sterile environment and because she needs to have immediate access to a bathroom.  Her bowel movements may be uncontrollable, she develops fevers, intense abdominal pain, chills, and she has a lot of shakiness after she becomes ill.  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  She treats her IBS with dicyclomine though if she uses too much of that she becomes constipated.  She has recently been attempting to lose weight and she has noticed her dietary changes have improved her IBS.  She also tries to drink plenty of fluids when she takes dicyclomine to try to jones off constipation    Review of Systems   As documented above       Objective    /70   Pulse 86   Temp 96.8  F (36  C) (Temporal)   Wt  81.2 kg (179 lb)   SpO2 95%   BMI 31.71 kg/m    Body mass index is 31.71 kg/m .  Physical Exam   GENERAL: healthy, alert and no distress  NECK: no adenopathy, no asymmetry, masses, or scars and thyroid normal to palpation  RESP: lungs clear to auscultation - no rales, rhonchi or wheezes  CV: regular rate and rhythm, normal S1 S2, no S3 or S4, no murmur, click or rub, no peripheral edema and peripheral pulses strong  ABDOMEN: soft, nontender, no hepatosplenomegaly, no masses and bowel sounds normal  MS: no gross musculoskeletal defects noted, no edema  PSYCH: mentation appears normal, affect normal/bright

## 2021-02-10 ENCOUNTER — OFFICE VISIT (OUTPATIENT)
Dept: FAMILY MEDICINE | Facility: OTHER | Age: 61
End: 2021-02-10
Payer: COMMERCIAL

## 2021-02-10 VITALS
OXYGEN SATURATION: 95 % | BODY MASS INDEX: 31.71 KG/M2 | DIASTOLIC BLOOD PRESSURE: 70 MMHG | TEMPERATURE: 96.8 F | HEART RATE: 86 BPM | WEIGHT: 179 LBS | SYSTOLIC BLOOD PRESSURE: 100 MMHG

## 2021-02-10 DIAGNOSIS — Z87.19 HISTORY OF DIVERTICULITIS: ICD-10-CM

## 2021-02-10 DIAGNOSIS — K58.2 IRRITABLE BOWEL SYNDROME WITH BOTH CONSTIPATION AND DIARRHEA: Primary | ICD-10-CM

## 2021-02-10 DIAGNOSIS — Z02.89 ENCOUNTER FOR COMPLETION OF FORM WITH PATIENT: ICD-10-CM

## 2021-02-10 PROCEDURE — 99213 OFFICE O/P EST LOW 20 MIN: CPT | Performed by: PHYSICIAN ASSISTANT

## 2021-03-22 DIAGNOSIS — K21.00 GASTROESOPHAGEAL REFLUX DISEASE WITH ESOPHAGITIS: ICD-10-CM

## 2021-03-23 RX ORDER — OMEPRAZOLE 40 MG/1
CAPSULE, DELAYED RELEASE ORAL
Qty: 90 CAPSULE | Refills: 3 | Status: SHIPPED | OUTPATIENT
Start: 2021-03-23 | End: 2021-12-08

## 2021-04-15 ENCOUNTER — TELEPHONE (OUTPATIENT)
Dept: FAMILY MEDICINE | Facility: OTHER | Age: 61
End: 2021-04-15

## 2021-04-15 NOTE — TELEPHONE ENCOUNTER
Reason for Call:  Form, our goal is to have forms completed with 72 hours, however, some forms may require a visit or additional information.    Type of letter, form or note:  medical    Who is the form from?: [facility] (if other please explain)    Where did the form come from: form was faxed in    What clinic location was the form placed at?: [facility] - [phone]    Where the form was placed: Team B Form Bin    What number is listed as a contact on the form?: fax [phone]       Additional comments: Please complete and fax    Call taken on 4/15/2021 at 4:52 PM by Ophelia Rogers

## 2021-04-19 ENCOUNTER — MEDICAL CORRESPONDENCE (OUTPATIENT)
Dept: HEALTH INFORMATION MANAGEMENT | Facility: CLINIC | Age: 61
End: 2021-04-19

## 2021-06-12 ENCOUNTER — HOSPITAL ENCOUNTER (EMERGENCY)
Facility: CLINIC | Age: 61
Discharge: HOME | End: 2021-06-12
Attending: EMERGENCY MEDICINE | Admitting: EMERGENCY MEDICINE
Payer: COMMERCIAL

## 2021-06-12 ENCOUNTER — APPOINTMENT (OUTPATIENT)
Dept: CT IMAGING | Facility: CLINIC | Age: 61
End: 2021-06-12
Attending: EMERGENCY MEDICINE
Payer: COMMERCIAL

## 2021-06-12 VITALS
HEART RATE: 84 BPM | SYSTOLIC BLOOD PRESSURE: 120 MMHG | RESPIRATION RATE: 18 BRPM | OXYGEN SATURATION: 98 % | BODY MASS INDEX: 30.65 KG/M2 | DIASTOLIC BLOOD PRESSURE: 68 MMHG | TEMPERATURE: 98.6 F | WEIGHT: 173 LBS

## 2021-06-12 DIAGNOSIS — K57.92 ACUTE DIVERTICULITIS: ICD-10-CM

## 2021-06-12 LAB
ANION GAP SERPL CALCULATED.3IONS-SCNC: 5 MMOL/L (ref 3–14)
BASOPHILS # BLD AUTO: 0.1 10E9/L (ref 0–0.2)
BASOPHILS NFR BLD AUTO: 0.7 %
BUN SERPL-MCNC: 15 MG/DL (ref 7–30)
CALCIUM SERPL-MCNC: 8.6 MG/DL (ref 8.5–10.1)
CHLORIDE SERPL-SCNC: 109 MMOL/L (ref 94–109)
CO2 SERPL-SCNC: 27 MMOL/L (ref 20–32)
CREAT SERPL-MCNC: 0.82 MG/DL (ref 0.52–1.04)
DIFFERENTIAL METHOD BLD: NORMAL
EOSINOPHIL # BLD AUTO: 0.2 10E9/L (ref 0–0.7)
EOSINOPHIL NFR BLD AUTO: 2.3 %
ERYTHROCYTE [DISTWIDTH] IN BLOOD BY AUTOMATED COUNT: 13.2 % (ref 10–15)
GFR SERPL CREATININE-BSD FRML MDRD: 79 ML/MIN/{1.73_M2}
GLUCOSE SERPL-MCNC: 93 MG/DL (ref 70–99)
HCT VFR BLD AUTO: 36 % (ref 35–47)
HGB BLD-MCNC: 11.9 G/DL (ref 11.7–15.7)
IMM GRANULOCYTES # BLD: 0 10E9/L (ref 0–0.4)
IMM GRANULOCYTES NFR BLD: 0.3 %
LYMPHOCYTES # BLD AUTO: 2.3 10E9/L (ref 0.8–5.3)
LYMPHOCYTES NFR BLD AUTO: 22.8 %
MCH RBC QN AUTO: 29.2 PG (ref 26.5–33)
MCHC RBC AUTO-ENTMCNC: 33.1 G/DL (ref 31.5–36.5)
MCV RBC AUTO: 89 FL (ref 78–100)
MONOCYTES # BLD AUTO: 1 10E9/L (ref 0–1.3)
MONOCYTES NFR BLD AUTO: 9.4 %
NEUTROPHILS # BLD AUTO: 6.6 10E9/L (ref 1.6–8.3)
NEUTROPHILS NFR BLD AUTO: 64.5 %
NRBC # BLD AUTO: 0 10*3/UL
NRBC BLD AUTO-RTO: 0 /100
PLATELET # BLD AUTO: 232 10E9/L (ref 150–450)
POTASSIUM SERPL-SCNC: 3.9 MMOL/L (ref 3.4–5.3)
RBC # BLD AUTO: 4.07 10E12/L (ref 3.8–5.2)
SODIUM SERPL-SCNC: 141 MMOL/L (ref 133–144)
WBC # BLD AUTO: 10.2 10E9/L (ref 4–11)

## 2021-06-12 PROCEDURE — 99285 EMERGENCY DEPT VISIT HI MDM: CPT | Mod: 25 | Performed by: EMERGENCY MEDICINE

## 2021-06-12 PROCEDURE — 99285 EMERGENCY DEPT VISIT HI MDM: CPT | Performed by: EMERGENCY MEDICINE

## 2021-06-12 PROCEDURE — 250N000009 HC RX 250: Performed by: EMERGENCY MEDICINE

## 2021-06-12 PROCEDURE — 96374 THER/PROPH/DIAG INJ IV PUSH: CPT | Mod: 59 | Performed by: EMERGENCY MEDICINE

## 2021-06-12 PROCEDURE — 250N000011 HC RX IP 250 OP 636: Performed by: EMERGENCY MEDICINE

## 2021-06-12 PROCEDURE — 96375 TX/PRO/DX INJ NEW DRUG ADDON: CPT | Performed by: EMERGENCY MEDICINE

## 2021-06-12 PROCEDURE — 80048 BASIC METABOLIC PNL TOTAL CA: CPT | Performed by: EMERGENCY MEDICINE

## 2021-06-12 PROCEDURE — 85025 COMPLETE CBC W/AUTO DIFF WBC: CPT | Performed by: EMERGENCY MEDICINE

## 2021-06-12 PROCEDURE — 74177 CT ABD & PELVIS W/CONTRAST: CPT

## 2021-06-12 RX ORDER — HYDROCODONE BITARTRATE AND ACETAMINOPHEN 5; 325 MG/1; MG/1
1 TABLET ORAL EVERY 6 HOURS PRN
Qty: 15 TABLET | Refills: 0 | Status: SHIPPED | OUTPATIENT
Start: 2021-06-12 | End: 2021-12-08

## 2021-06-12 RX ORDER — ONDANSETRON 4 MG/1
4 TABLET, ORALLY DISINTEGRATING ORAL EVERY 6 HOURS PRN
Qty: 10 TABLET | Refills: 0 | Status: SHIPPED | OUTPATIENT
Start: 2021-06-12 | End: 2021-06-19

## 2021-06-12 RX ORDER — IOPAMIDOL 755 MG/ML
100 INJECTION, SOLUTION INTRAVASCULAR ONCE
Status: COMPLETED | OUTPATIENT
Start: 2021-06-12 | End: 2021-06-12

## 2021-06-12 RX ORDER — ONDANSETRON 2 MG/ML
4 INJECTION INTRAMUSCULAR; INTRAVENOUS EVERY 30 MIN PRN
Status: DISCONTINUED | OUTPATIENT
Start: 2021-06-12 | End: 2021-06-12 | Stop reason: HOSPADM

## 2021-06-12 RX ORDER — HYDROMORPHONE HYDROCHLORIDE 1 MG/ML
0.5 INJECTION, SOLUTION INTRAMUSCULAR; INTRAVENOUS; SUBCUTANEOUS
Status: DISCONTINUED | OUTPATIENT
Start: 2021-06-12 | End: 2021-06-12 | Stop reason: HOSPADM

## 2021-06-12 RX ADMIN — SODIUM CHLORIDE 60 ML: 9 INJECTION, SOLUTION INTRAVENOUS at 12:26

## 2021-06-12 RX ADMIN — ONDANSETRON 4 MG: 2 INJECTION INTRAMUSCULAR; INTRAVENOUS at 12:35

## 2021-06-12 RX ADMIN — HYDROMORPHONE HYDROCHLORIDE 0.5 MG: 1 INJECTION, SOLUTION INTRAMUSCULAR; INTRAVENOUS; SUBCUTANEOUS at 12:35

## 2021-06-12 RX ADMIN — IOPAMIDOL 85 ML: 755 INJECTION, SOLUTION INTRAVENOUS at 12:26

## 2021-06-12 NOTE — ED PROVIDER NOTES
History     Chief Complaint   Patient presents with     Abdominal Pain     The history is provided by the patient.     Sun De La Garza is a 60 year old female with a history significant for IBS, diverticulosis, diverticulitis, internal hemorrhoids who presents to the emergency department secondary to left lower abdominal pain for the past 4 days. The patient has had flare ups of her diverticulitis once a year for the past several years. Lately her episodes have been increasing in frequency, but she was able to deal with them at home. Her current pain feels similar to previous diverticulitis flares. She has discomfort moving around and notes her pain is worse when sitting. Denies fevers, vomiting. No previous operations have been done for her diverticulitis. Family history of diverticulitis.     Allergies:  No Known Allergies    Problem List:    Patient Active Problem List    Diagnosis Date Noted     Irritable bowel syndrome with both constipation and diarrhea 04/19/2018     Priority: Medium     Sleep apnea, unspecified type 09/13/2017     Priority: Medium     IBS (irritable bowel syndrome) 03/16/2016     Priority: Medium     Diverticulosis of large intestine 03/16/2016     Priority: Medium     Vitamin D deficiency 09/05/2014     Priority: Medium     Problem list name updated by automated process. Provider to review       Depression with anxiety 09/05/2014     Priority: Medium     CARDIOVASCULAR SCREENING; LDL GOAL LESS THAN 160 01/04/2013     Priority: Medium     Chronic abdominal pain 01/04/2013     Priority: Medium     Skin tag 01/04/2013     Priority: Medium     Sebaceous cyst 01/04/2013     Priority: Medium     head       Internal hemorrhoids 08/25/1997     Priority: Medium     Allergic rhinitis 08/25/1997     Priority: Medium     Depressive disorder 08/25/1997     Priority: Medium     Constipation 08/25/1997     Priority: Medium        Past Medical History:    No past medical history on file.    Past Surgical  History:    Past Surgical History:   Procedure Laterality Date     COLONOSCOPY N/A 2014    Procedure: COLONOSCOPY;  Surgeon: Mj Morales MD;  Location:  GI     ESOPHAGOSCOPY, GASTROSCOPY, DUODENOSCOPY (EGD), COMBINED N/A 2014    Procedure: COMBINED ESOPHAGOSCOPY, GASTROSCOPY, DUODENOSCOPY (EGD), BIOPSY SINGLE OR MULTIPLE;  Surgeon: Mj Morales MD;  Location:  GI     GYN SURGERY      ablation       Family History:    Family History   Problem Relation Age of Onset     Other Cancer Father      Melanoma Father      Hypertension Father      Hyperlipidemia Mother      Hypertension Brother      Hypertension Brother      Anesthesia Reaction No family hx of        Social History:  Marital Status:  Single [1]  Social History     Tobacco Use     Smoking status: Former Smoker     Packs/day: 0.25     Years: 32.00     Pack years: 8.00     Start date: 1980     Quit date: 3/4/2012     Years since quittin.2     Smokeless tobacco: Never Used   Substance Use Topics     Alcohol use: No     Comment: socailly     Drug use: No        Medications:    amoxicillin-clavulanate (AUGMENTIN) 875-125 MG tablet  HYDROcodone-acetaminophen (NORCO) 5-325 MG tablet  ondansetron (ZOFRAN ODT) 4 MG ODT tab  aspirin 81 MG tablet  buPROPion (WELLBUTRIN XL) 300 MG 24 hr tablet  Calcium Carbonate-Vitamin D (CALCIUM 500 + D PO)  dicyclomine (BENTYL) 10 MG capsule  hydrocortisone (ANUSOL-HC) 2.5 % cream  omeprazole (PRILOSEC) 40 MG DR capsule  Prenatal Vit-Fe Fumarate-FA (PRENATAL VITAMIN PO)          Review of Systems   All other systems reviewed and are negative.      Physical Exam   BP: 120/68  Pulse: 84  Temp: 98.6  F (37  C)  Resp: 18  Weight: 78.5 kg (173 lb)  SpO2: 98 %      Physical Exam  Vitals signs and nursing note reviewed.   Constitutional:       General: She is not in acute distress.     Appearance: Normal appearance. She is not diaphoretic.   HENT:      Head: Normocephalic and atraumatic.      Right Ear:  External ear normal.      Left Ear: External ear normal.      Nose: Nose normal.      Mouth/Throat:      Mouth: Mucous membranes are moist.      Pharynx: Oropharynx is clear.   Eyes:      General: No scleral icterus.     Extraocular Movements: Extraocular movements intact.      Conjunctiva/sclera: Conjunctivae normal.      Pupils: Pupils are equal, round, and reactive to light.   Neck:      Musculoskeletal: Normal range of motion and neck supple. No neck rigidity.   Cardiovascular:      Rate and Rhythm: Normal rate.   Pulmonary:      Effort: Pulmonary effort is normal. No respiratory distress.   Abdominal:      Palpations: Abdomen is soft.      Tenderness: There is abdominal tenderness in the left lower quadrant. There is guarding. There is no rebound.      Comments: Severe left lower quadrant tenderness.    Musculoskeletal: Normal range of motion.         General: No deformity or signs of injury.      Right lower leg: No edema.      Left lower leg: No edema.   Skin:     General: Skin is warm and dry.      Coloration: Skin is not pale.      Findings: No rash.   Neurological:      General: No focal deficit present.      Mental Status: She is alert and oriented to person, place, and time. Mental status is at baseline.   Psychiatric:         Behavior: Behavior normal.         Thought Content: Thought content normal.         ED Course        Procedures      Critical Care time:  none    Results for orders placed or performed during the hospital encounter of 06/12/21 (from the past 24 hour(s))   CBC with platelets differential   Result Value Ref Range    WBC 10.2 4.0 - 11.0 10e9/L    RBC Count 4.07 3.8 - 5.2 10e12/L    Hemoglobin 11.9 11.7 - 15.7 g/dL    Hematocrit 36.0 35.0 - 47.0 %    MCV 89 78 - 100 fl    MCH 29.2 26.5 - 33.0 pg    MCHC 33.1 31.5 - 36.5 g/dL    RDW 13.2 10.0 - 15.0 %    Platelet Count 232 150 - 450 10e9/L    Diff Method Automated Method     % Neutrophils 64.5 %    % Lymphocytes 22.8 %    % Monocytes  9.4 %    % Eosinophils 2.3 %    % Basophils 0.7 %    % Immature Granulocytes 0.3 %    Nucleated RBCs 0 0 /100    Absolute Neutrophil 6.6 1.6 - 8.3 10e9/L    Absolute Lymphocytes 2.3 0.8 - 5.3 10e9/L    Absolute Monocytes 1.0 0.0 - 1.3 10e9/L    Absolute Eosinophils 0.2 0.0 - 0.7 10e9/L    Absolute Basophils 0.1 0.0 - 0.2 10e9/L    Abs Immature Granulocytes 0.0 0 - 0.4 10e9/L    Absolute Nucleated RBC 0.0    Basic metabolic panel   Result Value Ref Range    Sodium 141 133 - 144 mmol/L    Potassium 3.9 3.4 - 5.3 mmol/L    Chloride 109 94 - 109 mmol/L    Carbon Dioxide 27 20 - 32 mmol/L    Anion Gap 5 3 - 14 mmol/L    Glucose 93 70 - 99 mg/dL    Urea Nitrogen 15 7 - 30 mg/dL    Creatinine 0.82 0.52 - 1.04 mg/dL    GFR Estimate 79 >60 mL/min/[1.73_m2]    GFR Estimate If Black >90 >60 mL/min/[1.73_m2]    Calcium 8.6 8.5 - 10.1 mg/dL   CT Abdomen Pelvis w Contrast    Narrative    CT ABDOMEN/PELVIS WITH CONTRAST June 12, 2021 12:27 PM    CLINICAL HISTORY: Diverticulitis suspected.    TECHNIQUE: CT scan of the abdomen and pelvis was performed following  injection of IV contrast. Multiplanar reformats were obtained. Dose  reduction techniques were used.  CONTRAST: The patient was injected with 85 mL Isovue-370.    COMPARISON: CT abdomen and pelvis 3/4/2020.    FINDINGS:   LOWER CHEST: Coronary artery calcifications.    HEPATOBILIARY: Contracted gallbladder. No acute liver abnormality.    PANCREAS: Normal.    SPLEEN: Normal.    ADRENAL GLANDS: Normal.    KIDNEYS/BLADDER: Normal.    BOWEL: Acute diverticulitis at the left lower quadrant involving the  proximal sigmoid colon, image 174. Adjacent trace fluid. No abscess or  free air.    PELVIC ORGANS: Normal.    ADDITIONAL FINDINGS: None.    MUSCULOSKELETAL: No acute abnormality.      Impression    IMPRESSION:   1.  Acute diverticulitis at the proximal sigmoid colon. No abscess.  2.  Coronary artery calcifications.    MAHIN RUIZ MD       Medications   sodium chloride (PF) 0.9%  PF flush 3 mL (3 mLs Intravenous Given 6/12/21 1225)   iopamidol (ISOVUE-370) solution 100 mL (85 mLs Intravenous Given 6/12/21 1226)   sodium chloride 0.9 % bag 500mL for CT scan flush use (60 mLs As instructed Given 6/12/21 1226)       Assessments & Plan (with Medical Decision Making)  Sun is a 60 year old female who presents to the ED with concerns of left lower abdominal pain for the past 4 days. History of diverticulitis, diverticulosis, IBS. No associated fevers, vomiting. Vitals are stable. Focused physical exam as noted above. Basic blood work showed no abnormalities. CT confirmed acute diverticulitis without discrete evidence for rupture.  Have placed on Augmentin and pain medications.  Have recommended follow-up in clinic next week.  Return anytime sooner the emergency department if condition worsens or any other concern       I have reviewed the nursing notes.    I have reviewed the findings, diagnosis, plan and need for follow up with the patient.       Discharge Medication List as of 6/12/2021  1:03 PM      START taking these medications    Details   amoxicillin-clavulanate (AUGMENTIN) 875-125 MG tablet Take 1 tablet by mouth 2 times daily, Disp-20 tablet, R-0, [facility]      HYDROcodone-acetaminophen (NORCO) 5-325 MG tablet Take 1 tablet by mouth every 6 hours as needed for severe pain, Disp-15 tablet, R-0, [facility]      ondansetron (ZOFRAN ODT) 4 MG ODT tab Take 1 tablet (4 mg) by mouth every 6 hours as needed for nausea, Disp-10 tablet, R-0, E-Prescribe             Final diagnoses:   Acute diverticulitis       This document serves as a record of services personally performed by Tato Hill MD. It was created on their behalf by Ashlee Jackson, a trained medical scribe. The creation of this record is based on the provider's personal observations and the statements of the patient. This document has been checked and approved by the attending provider.    Note: Chart documentation done in part  with Dragon Voice Recognition software. Although reviewed after completion, some word and grammatical errors may remain.    6/12/2021   [facility] EMERGENCY DEPT     Tato Hill MD  06/12/21 [id number]

## 2021-07-24 ENCOUNTER — HEALTH MAINTENANCE LETTER (OUTPATIENT)
Age: 61
End: 2021-07-24

## 2021-08-31 ENCOUNTER — TELEPHONE (OUTPATIENT)
Dept: FAMILY MEDICINE | Facility: OTHER | Age: 61
End: 2021-08-31

## 2021-08-31 DIAGNOSIS — G89.29 CHRONIC ABDOMINAL PAIN: ICD-10-CM

## 2021-08-31 DIAGNOSIS — K58.2 IRRITABLE BOWEL SYNDROME WITH BOTH CONSTIPATION AND DIARRHEA: ICD-10-CM

## 2021-08-31 DIAGNOSIS — R10.9 CHRONIC ABDOMINAL PAIN: ICD-10-CM

## 2021-08-31 DIAGNOSIS — K57.30 DIVERTICULOSIS OF LARGE INTESTINE WITHOUT HEMORRHAGE: ICD-10-CM

## 2021-08-31 RX ORDER — DICYCLOMINE HYDROCHLORIDE 10 MG/1
CAPSULE ORAL
Qty: 60 CAPSULE | Refills: 1 | Status: SHIPPED | OUTPATIENT
Start: 2021-08-31 | End: 2021-12-08

## 2021-08-31 NOTE — TELEPHONE ENCOUNTER
Please call patient, I have sent in a refill of this medication.  If she is needing an appointment, please triage for urgency  As documented above

## 2021-08-31 NOTE — TELEPHONE ENCOUNTER
Form faxed, copied and sent to patient in the mail. Copy also sent to scanning.    Obdulia Jackson MA     stated

## 2021-08-31 NOTE — TELEPHONE ENCOUNTER
Patient is requesting the following medication. She has not had it since 2018. She is requesting that the medication is sent to the [facility]. RN unable to fill routing to PCP.     dicyclomine (BENTYL) 10 MG capsule 240 capsule 0 1/3/2018  No   Sig: TAKE 1 CAPSULE FOUR TIMES A DAY BEFORE MEALS AND NIGHTLY   Sent to pharmacy as: dicyclomine (BENTYL) 10 MG capsule   Class: E-Prescribe   Notes to Pharmacy: Should be filled around 02/2018   Order: [id number]   E-Prescribing Status: Receipt confirmed by pharmacy (1/3/2018  7:40 AM CST)     SABAS JamaN, RN, PHN  [place]  August 31, 2021

## 2021-08-31 NOTE — TELEPHONE ENCOUNTER
PA for the patient to return call.   SABAS JamaN, RN, PHN  [place]  August 31, 2021

## 2021-09-01 ENCOUNTER — NURSE TRIAGE (OUTPATIENT)
Dept: NURSING | Facility: CLINIC | Age: 61
End: 2021-09-01

## 2021-09-01 NOTE — TELEPHONE ENCOUNTER
Simin sent to the patient.   Ev Jacob, SABASN, RN, PHN  [place]  September 1, 2021

## 2021-09-01 NOTE — TELEPHONE ENCOUNTER
Pt called stating she called yesterday requesting a dicyclomine refill. Pt was informed medication was sent to the pharmacy with 1 refill and she stated okay.     dicyclomine (BENTYL) 10 MG capsule 60 capsule 1 8/31/2021  No   Sig: TAKE 1 CAPSULE FOUR TIMES A DAY BEFORE MEALS AND NIGHTLY   Sent to pharmacy as: Dicyclomine HCl 10 MG Oral Capsule (BENTYL)   Class: E-Prescribe   Order: [id number]   E-Prescribing Status: Receipt confirmed by pharmacy (8/31/2021  3:42 PM CDT)     Harry Ross RN  [facility]       COVID 19 Nurse Triage Plan/Patient Instructions    Please be aware that novel coronavirus (COVID-19) may be circulating in the community. If you develop symptoms such as fever, cough, or SOB or if you have concerns about the presence of another infection including coronavirus (COVID-19), please contact your health care provider or visit [email or web address].     Disposition/Instructions    Home care recommended. Follow home care protocol based instructions.    Thank you for taking steps to prevent the spread of this virus.  o Limit your contact with others.  o Wear a simple mask to cover your cough.  o Wash your hands well and often.    Resources    [facility]: About COVID-19: [email or web address]    CDC: What to Do If You're Sick: www.cdc.gov/coronavirus/2019-ncov/about/steps-when-sick.html    CDC: Ending Home Isolation: www.cdc.gov/coronavirus/2019-ncov/hcp/disposition-in-home-patients.html     CDC: Caring for Someone: www.cdc.gov/coronavirus/2019-ncov/if-you-are-sick/care-for-someone.html     [facility]: Interim Guidance for Hospital Discharge to Home: www.health.[facility].[place]/diseases/coronavirus/hcp/hospdischarge.pdf    [facility] clinical trials (COVID-19 research studies): [email or web address]-trials     Below are the COVID-19 hotlines at the [place] Department of [facility] ([facility]). Interpreters are available.   o For health questions: Call  714.191.7584 or 1-204.124.5953 (7 a.m. to 7 p.m.)  o For questions about schools and childcare: Call 878-172-1423 or 1-312.900.8550 (7 a.m. to 7 p.m.)                     Reason for Disposition    [1] Prescription prescribed recently is not at pharmacy AND [2] triager has access to patient's EMR AND [3] prescription is recorded in the EMR    Protocols used: MEDICATION QUESTION CALL-A-AH

## 2021-09-18 ENCOUNTER — HEALTH MAINTENANCE LETTER (OUTPATIENT)
Age: 61
End: 2021-09-18

## 2021-10-29 ENCOUNTER — TELEPHONE (OUTPATIENT)
Dept: FAMILY MEDICINE | Facility: OTHER | Age: 61
End: 2021-10-29

## 2021-10-29 DIAGNOSIS — Z12.31 ENCOUNTER FOR SCREENING MAMMOGRAM FOR BREAST CANCER: Primary | ICD-10-CM

## 2021-10-29 NOTE — TELEPHONE ENCOUNTER
Patient Quality Outreach Summary      Summary:    Patient is due/failing the following:   Breast Cancer Screening - Mammogram and Physical     Type of outreach:    Phone, left message for patient/parent to call back.    Questions for provider review:    None                                                                                                                    Karla Kc CMA       Chart routed to Care Team.

## 2021-10-29 NOTE — TELEPHONE ENCOUNTER
Reason for Call: Request for an order or referral:    Order or referral being requested: mammo order    Date needed: as soon as possible    Has the patient been seen by the PCP for this problem? YES    Additional comments: Patient was recommended to schedule a mammogram she does not currently have a order on file. Patient would like the provider to place a mammogram order.     Phone number Patient can be reached at:  Home number on file [phone] (home) or Cell number on file:    Telephone Information:   Mobile [phone]       Best Time:  N/a    Can we leave a detailed message on this number?  YES    Call taken on 10/29/2021 at 3:35 PM by Ad Edward

## 2021-12-08 ENCOUNTER — OFFICE VISIT (OUTPATIENT)
Dept: FAMILY MEDICINE | Facility: OTHER | Age: 61
End: 2021-12-08
Payer: COMMERCIAL

## 2021-12-08 ENCOUNTER — HOSPITAL ENCOUNTER (OUTPATIENT)
Dept: MAMMOGRAPHY | Facility: CLINIC | Age: 61
Discharge: HOME | End: 2021-12-08
Attending: PHYSICIAN ASSISTANT | Admitting: PHYSICIAN ASSISTANT
Payer: COMMERCIAL

## 2021-12-08 VITALS
BODY MASS INDEX: 32.43 KG/M2 | RESPIRATION RATE: 16 BRPM | WEIGHT: 183 LBS | OXYGEN SATURATION: 98 % | TEMPERATURE: 97.4 F | HEIGHT: 63 IN | HEART RATE: 82 BPM | DIASTOLIC BLOOD PRESSURE: 62 MMHG | SYSTOLIC BLOOD PRESSURE: 118 MMHG

## 2021-12-08 DIAGNOSIS — K21.00 GASTROESOPHAGEAL REFLUX DISEASE WITH ESOPHAGITIS WITHOUT HEMORRHAGE: ICD-10-CM

## 2021-12-08 DIAGNOSIS — Z13.6 CARDIOVASCULAR SCREENING; LDL GOAL LESS THAN 160: ICD-10-CM

## 2021-12-08 DIAGNOSIS — G89.29 CHRONIC ABDOMINAL PAIN: ICD-10-CM

## 2021-12-08 DIAGNOSIS — K57.30 DIVERTICULOSIS OF LARGE INTESTINE WITHOUT HEMORRHAGE: ICD-10-CM

## 2021-12-08 DIAGNOSIS — R10.9 CHRONIC ABDOMINAL PAIN: ICD-10-CM

## 2021-12-08 DIAGNOSIS — R32 URINARY INCONTINENCE, UNSPECIFIED TYPE: ICD-10-CM

## 2021-12-08 DIAGNOSIS — Z12.31 ENCOUNTER FOR SCREENING MAMMOGRAM FOR BREAST CANCER: ICD-10-CM

## 2021-12-08 DIAGNOSIS — Z00.01 ENCOUNTER FOR ROUTINE ADULT MEDICAL EXAM WITH ABNORMAL FINDINGS: Primary | ICD-10-CM

## 2021-12-08 DIAGNOSIS — K58.2 IRRITABLE BOWEL SYNDROME WITH BOTH CONSTIPATION AND DIARRHEA: ICD-10-CM

## 2021-12-08 DIAGNOSIS — F41.8 DEPRESSION WITH ANXIETY: ICD-10-CM

## 2021-12-08 LAB
ALBUMIN UR-MCNC: NEGATIVE MG/DL
APPEARANCE UR: CLEAR
BILIRUB UR QL STRIP: NEGATIVE
COLOR UR AUTO: YELLOW
GLUCOSE UR STRIP-MCNC: NEGATIVE MG/DL
HGB UR QL STRIP: NEGATIVE
KETONES UR STRIP-MCNC: NEGATIVE MG/DL
LEUKOCYTE ESTERASE UR QL STRIP: ABNORMAL
NITRATE UR QL: NEGATIVE
PH UR STRIP: 5.5 [PH] (ref 5–7)
RBC #/AREA URNS AUTO: ABNORMAL /HPF
SP GR UR STRIP: >=1.03 (ref 1–1.03)
SQUAMOUS #/AREA URNS AUTO: ABNORMAL /LPF
UROBILINOGEN UR STRIP-ACNC: 0.2 E.U./DL
WBC #/AREA URNS AUTO: ABNORMAL /HPF

## 2021-12-08 PROCEDURE — 99213 OFFICE O/P EST LOW 20 MIN: CPT | Mod: 25 | Performed by: PHYSICIAN ASSISTANT

## 2021-12-08 PROCEDURE — 81001 URINALYSIS AUTO W/SCOPE: CPT | Performed by: PHYSICIAN ASSISTANT

## 2021-12-08 PROCEDURE — 77063 BREAST TOMOSYNTHESIS BI: CPT

## 2021-12-08 PROCEDURE — 99396 PREV VISIT EST AGE 40-64: CPT | Performed by: PHYSICIAN ASSISTANT

## 2021-12-08 RX ORDER — OMEPRAZOLE 40 MG/1
CAPSULE, DELAYED RELEASE ORAL
Qty: 90 CAPSULE | Refills: 3 | Status: SHIPPED | OUTPATIENT
Start: 2021-12-08 | End: 2022-03-15

## 2021-12-08 RX ORDER — FLUOXETINE 10 MG/1
10 CAPSULE ORAL DAILY
Qty: 30 CAPSULE | Refills: 1 | Status: SHIPPED | OUTPATIENT
Start: 2021-12-08 | End: 2022-01-26

## 2021-12-08 RX ORDER — DICYCLOMINE HYDROCHLORIDE 10 MG/1
CAPSULE ORAL
Qty: 60 CAPSULE | Refills: 5 | Status: SHIPPED | OUTPATIENT
Start: 2021-12-08 | End: 2022-07-06

## 2021-12-08 RX ORDER — BUPROPION HYDROCHLORIDE 300 MG/1
TABLET ORAL
Qty: 90 TABLET | Refills: 3 | Status: SHIPPED | OUTPATIENT
Start: 2021-12-08 | End: 2023-09-21

## 2021-12-08 ASSESSMENT — MIFFLIN-ST. JEOR: SCORE: 1374.21

## 2021-12-08 ASSESSMENT — ENCOUNTER SYMPTOMS
COUGH: 0
DIZZINESS: 0
SHORTNESS OF BREATH: 1
DIARRHEA: 0
MYALGIAS: 1
HEMATOCHEZIA: 0
PARESTHESIAS: 1
ABDOMINAL PAIN: 0
CHILLS: 0
FEVER: 0
JOINT SWELLING: 0
CONSTIPATION: 0
SORE THROAT: 0
PALPITATIONS: 0
EYE PAIN: 0
HEADACHES: 0
NAUSEA: 0
ARTHRALGIAS: 0
NERVOUS/ANXIOUS: 0
DYSURIA: 0
WEAKNESS: 0
HEMATURIA: 0
FREQUENCY: 0
HEARTBURN: 1

## 2021-12-08 ASSESSMENT — PATIENT HEALTH QUESTIONNAIRE - PHQ9
SUM OF ALL RESPONSES TO PHQ QUESTIONS 1-9: 14
SUM OF ALL RESPONSES TO PHQ QUESTIONS 1-9: 14
10. IF YOU CHECKED OFF ANY PROBLEMS, HOW DIFFICULT HAVE THESE PROBLEMS MADE IT FOR YOU TO DO YOUR WORK, TAKE CARE OF THINGS AT HOME, OR GET ALONG WITH OTHER PEOPLE: SOMEWHAT DIFFICULT

## 2021-12-08 ASSESSMENT — PAIN SCALES - GENERAL: PAINLEVEL: NO PAIN (0)

## 2021-12-08 NOTE — PROGRESS NOTES
SUBJECTIVE:   CC: Sun De La Garza is an 61 year old woman who presents for preventive health visit.       Patient has been advised of split billing requirements and indicates understanding: Yes  Healthy Habits:     Getting at least 3 servings of Calcium per day:  NO    Bi-annual eye exam:  Yes    Dental care twice a year:  Yes    Sleep apnea or symptoms of sleep apnea:  Sleep apnea    Diet:  Other    Frequency of exercise:  None    Taking medications regularly:  Yes    Medication side effects:  None    PHQ-2 Total Score: 5    Additional concerns today:  No        Depression and Anxiety Follow-Up    How are you doing with your depression since your last visit? Worsened this past year, she is not sure why.  Initially she thought it was because of the death of her dad which was just prior to her last appointment December of last year.  She admits that she is struggling with her relationship with her daughter, she has gained weight, she has no energy.  She feels very down on herself like she cannot do anything right.  She wonders if we can increase her bupropion.  She is not doing counseling but wants to return to [employer] which is always helpful for her mentally    How are you doing with your anxiety since your last visit?  Worsened as above    Are you having other symptoms that might be associated with depression or anxiety? No    Have you had a significant life event? Grief or Loss     Do you have any concerns with your use of alcohol or other drugs? No      On Thanksgiving, on her way to her son's house she had an episode of incontinence.  She got up out of the car to use the bathroom and lost control of her bladder, this has happened once more.  No signs of uti other than incontinence.  She does have some history of constipation and is struggling with that currently.  Social History     Tobacco Use     Smoking status: Former Smoker     Packs/day: 0.25     Years: 32.00     Pack years: 8.00     Start date: 5/9/1980      Quit date: 3/4/2012     Years since quittin.7     Smokeless tobacco: Never Used   Substance Use Topics     Alcohol use: No     Comment: socailly     Drug use: No     PHQ 2020   PHQ-9 Total Score 1 9 14   Q9: Thoughts of better off dead/self-harm past 2 weeks Not at all Not at all Not at all     MARIAN-7 SCORE 2019   Total Score - - -   Total Score - - -   Total Score 3 0 2     Last PHQ-9 2021   1.  Little interest or pleasure in doing things 2   2.  Feeling down, depressed, or hopeless 3   3.  Trouble falling or staying asleep, or sleeping too much 1   4.  Feeling tired or having little energy 3   5.  Poor appetite or overeating 3   6.  Feeling bad about yourself 2   7.  Trouble concentrating 0   8.  Moving slowly or restless 0   Q9: Thoughts of better off dead/self-harm past 2 weeks 0   PHQ-9 Total Score 14   Difficulty at work, home, or with people -     MARIAN-7  2020   1. Feeling nervous, anxious, or on edge 1   2. Not being able to stop or control worrying 1   3. Worrying too much about different things 0   4. Trouble relaxing 0   5. Being so restless that it is hard to sit still 0   6. Becoming easily annoyed or irritable 0   7. Feeling afraid, as if something awful might happen 0   MARIAN-7 Total Score 2   If you checked any problems, how difficult have they made it for you to do your work, take care of things at home, or get along with other people? Somewhat difficult       Suicide Assessment Five-step Evaluation and Treatment (SAFE-T)      Today's PHQ-2 Score:   PHQ-2 (  [employer]) 2019   Q1: Little interest or pleasure in doing things 0   Q2: Feeling down, depressed or hopeless 3   PHQ-2 Score 3   PHQ-2 Total Score (12-17 Years)- Positive if 3 or more points; Administer PHQ-A if positive 3   Q1: Little interest or pleasure in doing things -   Q2: Feeling down, depressed or hopeless -   PHQ-2 Score -       Abuse: Current or Past (Physical,  Sexual or Emotional) - Answers for HPI/ROS submitted by the patient on 2021  If you checked off any problems, how difficult have these problems made it for you to do your work, take care of things at home, or get along with other people?: Somewhat difficult  PHQ9 TOTAL SCORE: 14      Do you feel safe in your environment? Yes        Social History     Tobacco Use     Smoking status: Former Smoker     Packs/day: 0.25     Years: 32.00     Pack years: 8.00     Start date: 1980     Quit date: 3/4/2012     Years since quittin.7     Smokeless tobacco: Never Used   Substance Use Topics     Alcohol use: No     Comment: socailly     Answers for HPI/ROS submitted by the patient on 2021  If you checked off any problems, how difficult have these problems made it for you to do your work, take care of things at home, or get along with other people?: Somewhat difficult  PHQ9 TOTAL SCORE: 14        Alcohol Use 2019   Prescreen: >3 drinks/day or >7 drinks/week? Not Applicable   Prescreen: >3 drinks/day or >7 drinks/week? -       Reviewed orders with patient.  Reviewed health maintenance and updated orders accordingly - Yes  Labs reviewed in EPIC  BP Readings from Last 3 Encounters:   21 118/62   21 120/68   02/10/21 100/70    Wt Readings from Last 3 Encounters:   21 83 kg (183 lb)   21 78.5 kg (173 lb)   02/10/21 81.2 kg (179 lb)                  Patient Active Problem List   Diagnosis     CARDIOVASCULAR SCREENING; LDL GOAL LESS THAN 160     Chronic abdominal pain     Skin tag     Sebaceous cyst     Vitamin D deficiency     Depression with anxiety     IBS (irritable bowel syndrome)     Diverticulosis of large intestine     Sleep apnea, unspecified type     Internal hemorrhoids     Allergic rhinitis     Depressive disorder     Constipation     Irritable bowel syndrome with both constipation and diarrhea     Past Surgical History:   Procedure Laterality Date     COLONOSCOPY N/A 2014     Procedure: COLONOSCOPY;  Surgeon: Mj Morales MD;  Location:  GI     ESOPHAGOSCOPY, GASTROSCOPY, DUODENOSCOPY (EGD), COMBINED N/A 2014    Procedure: COMBINED ESOPHAGOSCOPY, GASTROSCOPY, DUODENOSCOPY (EGD), BIOPSY SINGLE OR MULTIPLE;  Surgeon: Mj Morales MD;  Location:  GI     GYN SURGERY      ablation       Social History     Tobacco Use     Smoking status: Former Smoker     Packs/day: 0.25     Years: 32.00     Pack years: 8.00     Start date: 1980     Quit date: 3/4/2012     Years since quittin.7     Smokeless tobacco: Never Used   Substance Use Topics     Alcohol use: No     Comment: socailly     Family History   Problem Relation Age of Onset     Other Cancer Father      Melanoma Father      Hypertension Father      Hyperlipidemia Mother      Hypertension Brother      Hypertension Brother      Anesthesia Reaction No family hx of          Current Outpatient Medications   Medication Sig Dispense Refill     aspirin 81 MG tablet Take 1 tablet (81 mg) by mouth daily 30 tablet      buPROPion (WELLBUTRIN XL) 300 MG 24 hr tablet TAKE 1 TABLET BY MOUTH IN  THE MORNING 90 tablet 3     Calcium Carbonate-Vitamin D (CALCIUM 500 + D PO)        dicyclomine (BENTYL) 10 MG capsule TAKE 1 CAPSULE FOUR TIMES A DAY BEFORE MEALS AND NIGHTLY 60 capsule 1     FLUoxetine (PROZAC) 10 MG capsule Take 1 capsule (10 mg) by mouth daily 30 capsule 1     hydrocortisone (ANUSOL-HC) 2.5 % cream Place rectally 2 times daily 90 g 1     omeprazole (PRILOSEC) 40 MG DR capsule TAKE 1 CAPSULE BY MOUTH  DAILY 30-60 MINUTES BEFORE  A MEAL. 90 capsule 3     Prenatal Vit-Fe Fumarate-FA (PRENATAL VITAMIN PO) Take 1 tablet by mouth daily       No Known Allergies    Breast Cancer Screening:  Any new diagnosis of family breast, ovarian, or bowel cancer? No    FHS-7:   Breast CA Risk Assessment (FHS-7) 2021   Did any of your first-degree relatives have breast or ovarian cancer? No   Did any of your relatives have bilateral  breast cancer? No   Did any man in your family have breast cancer? No   Did any woman in your family have breast and ovarian cancer? No   Did any woman in your family have breast cancer before age 50 y? No   Do you have 2 or more relatives with breast and/or ovarian cancer? No   Do you have 2 or more relatives with breast and/or bowel cancer? No       Mammogram Screening: Recommended mammography every 1-2 years with patient discussion and risk factor consideration  Pertinent mammograms are reviewed under the imaging tab.    History of abnormal Pap smear:   NO - age 30-65 PAP every 5 years with negative HPV co-testing recommended  Last 3 Pap and HPV Results:   PAP / HPV Latest Ref Rng & Units 5/1/2019 3/4/2016 1/4/2013   PAP (Historical) - NIL NIL NIL   HPV16 NEG:Negative Negative Negative -   HPV18 NEG:Negative Negative Negative -   HRHPV NEG:Negative Negative Negative -     PAP / HPV Latest Ref Rng & Units 5/1/2019 3/4/2016 1/4/2013   PAP (Historical) - NIL NIL NIL   HPV16 NEG:Negative Negative Negative -   HPV18 NEG:Negative Negative Negative -   HRHPV NEG:Negative Negative Negative -     Reviewed and updated as needed this visit by clinical staff  Tobacco  Allergies    Med Hx  Surg Hx  Fam Hx  Soc Hx       Reviewed and updated as needed this visit by Provider                   Review of Systems   Constitutional: Negative for chills and fever.   HENT: Positive for hearing loss. Negative for congestion, ear pain and sore throat.    Eyes: Negative for pain and visual disturbance.   Respiratory: Positive for shortness of breath. Negative for cough.    Cardiovascular: Negative for chest pain, palpitations and peripheral edema.   Gastrointestinal: Positive for heartburn. Negative for abdominal pain, constipation, diarrhea, hematochezia and nausea.   Genitourinary: Negative for dysuria, frequency, genital sores, hematuria and urgency.   Musculoskeletal: Positive for myalgias. Negative for arthralgias and joint  "swelling.   Skin: Negative for rash.   Neurological: Positive for paresthesias. Negative for dizziness, weakness and headaches.   Psychiatric/Behavioral: Positive for mood changes. The patient is not nervous/anxious.      Hearing loss, wears hearing aides  SHORTNESS OF BREATH she thinks it is from weight gain and inactivity, this occurs walking up a hill once. She has no exercises at all.  No chest pains  Omeprazole OTC is not helpful, she ran out of her 40 mg prescription , still gets occasional heartburn we reviewed the fact that over-the-counter is only 20 mg omeprazole she does use dicyclomine in the morning daily to help with her diarrhea and irritable bowel syndrome.  It is prescribed up to 4 times per day but she typically does not need to use it that frequently  Myalgias lower back and neck, seems to be related to work  Sees her chiro  Paresthesias- she does get numbness in feet if she stands up a lot  Mental health as above     OBJECTIVE:   /62   Pulse 82   Temp 97.4  F (36.3  C) (Temporal)   Resp 16   Ht 1.6 m (5' 3\")   Wt 83 kg (183 lb)   SpO2 98%   BMI 32.42 kg/m    Physical Exam  GENERAL APPEARANCE: healthy, alert and no distress  EYES: Eyes grossly normal to inspection, PERRL and conjunctivae and sclerae normal  HENT: ear canals and TM's normal, nose and mouth without ulcers or lesions, oropharynx clear and oral mucous membranes moist  NECK: no adenopathy, no asymmetry, masses, or scars and thyroid normal to palpation  RESP: lungs clear to auscultation - no rales, rhonchi or wheezes  BREAST: normal without masses, tenderness or nipple discharge and no palpable axillary masses or adenopathy  CV: regular rate and rhythm, normal S1 S2, no S3 or S4, no murmur, click or rub, no peripheral edema and peripheral pulses strong  ABDOMEN: soft, nontender, no hepatosplenomegaly, no masses and bowel sounds normal  MS: no musculoskeletal defects are noted and gait is age appropriate without " ataxia  SKIN: no suspicious lesions or rashes  NEURO: Normal strength and tone, sensory exam grossly normal, mentation intact and speech normal  PSYCH: mentation appears normal and affect normal/bright    Diagnostic Test Results:  Labs reviewed in Epic  Results for orders placed or performed in visit on 12/08/21 (from the past 24 hour(s))   UA Macro with Reflex to Micro and Culture - lab collect    Specimen: Urine, Midstream   Result Value Ref Range    Color Urine Yellow Colorless, Straw, Light Yellow, Yellow    Appearance Urine Clear Clear    Glucose Urine Negative Negative mg/dL    Bilirubin Urine Negative Negative    Ketones Urine Negative Negative mg/dL    Specific Gravity Urine >=1.030 1.003 - 1.035    Blood Urine Negative Negative    pH Urine 5.5 5.0 - 7.0    Protein Albumin Urine Negative Negative mg/dL    Urobilinogen Urine 0.2 0.2, 1.0 E.U./dL    Nitrite Urine Negative Negative    Leukocyte Esterase Urine Small (A) Negative   Urine Microscopic   Result Value Ref Range    RBC Urine 0-2 0-2 /HPF /HPF    WBC Urine 5-10 (A) 0-5 /HPF /HPF    Squamous Epithelials Urine Few (A) None Seen /LPF    Narrative    Urine Culture not indicated       ASSESSMENT/PLAN:   (Z00.01) Encounter for routine adult medical exam with abnormal findings  (primary encounter diagnosis)  Comment:   Plan: Recommend routine annual visits    (F41.8) Depression with anxiety  Comment: Continue current dosage of bupropion small dose of fluoxetine Discussed use of medication, common side effects, how medication works and the fact that improvement in anticipated in 4-6 weeks.   Plan: FLUoxetine (PROZAC) 10 MG capsule, buPROPion         (WELLBUTRIN XL) 300 MG 24 hr tablet            (R32) Urinary incontinence, unspecified type  Comment:   Plan: UA Macro with Reflex to Micro and Culture - lab        collect, Urine Microscopic        Await urine we will notify her of the results on my chart tomorrow consider PT for pelvic floor if this continues she  "needs to improve her constipation which would help    (R10.9,  G89.29) Chronic abdominal pain  Comment:   Plan: dicyclomine (BENTYL) 10 MG capsule        As needed use    (K57.30) Diverticulosis of large intestine without hemorrhage  Comment: Currently not having any issues  Plan: dicyclomine (BENTYL) 10 MG capsule            (K58.2) Irritable bowel syndrome with both constipation and diarrhea  Comment: Managing symptoms well currently  Plan: dicyclomine (BENTYL) 10 MG capsule            (K21.00) Gastroesophageal reflux disease with esophagitis without hemorrhage  Comment:   Plan: omeprazole (PRILOSEC) 40 MG DR capsule        Recheck 1 year sooner if worsening of symptoms    (Z13.6) CARDIOVASCULAR SCREENING; LDL GOAL LESS THAN 160  Comment:   Plan: Lipid panel reflex to direct LDL Fasting              Patient has been advised of split billing requirements and indicates understanding: Yes  COUNSELING:  Reviewed preventive health counseling, as reflected in patient instructions    Estimated body mass index is 32.42 kg/m  as calculated from the following:    Height as of this encounter: 1.6 m (5' 3\").    Weight as of this encounter: 83 kg (183 lb).    Weight management plan: Discussed healthy diet and exercise guidelines    She reports that she quit smoking about 9 years ago. She started smoking about 41 years ago. She has a 8.00 pack-year smoking history. She has never used smokeless tobacco.      Counseling Resources:  ATP IV Guidelines  Pooled Cohorts Equation Calculator  Breast Cancer Risk Calculator  BRCA-Related Cancer Risk Assessment: FHS-7 Tool  FRAX Risk Assessment  ICSI Preventive Guidelines  Dietary Guidelines for Americans, 2010  USDA's MyPlate  ASA Prophylaxis  Lung CA Screening    Denise Schulte PA-C  [facility]  "

## 2021-12-09 ASSESSMENT — PATIENT HEALTH QUESTIONNAIRE - PHQ9: SUM OF ALL RESPONSES TO PHQ QUESTIONS 1-9: 14

## 2022-01-26 ENCOUNTER — OFFICE VISIT (OUTPATIENT)
Dept: FAMILY MEDICINE | Facility: OTHER | Age: 62
End: 2022-01-26
Payer: COMMERCIAL

## 2022-01-26 ENCOUNTER — TELEPHONE (OUTPATIENT)
Dept: FAMILY MEDICINE | Facility: OTHER | Age: 62
End: 2022-01-26

## 2022-01-26 VITALS
RESPIRATION RATE: 20 BRPM | TEMPERATURE: 98.8 F | OXYGEN SATURATION: 91 % | BODY MASS INDEX: 32.17 KG/M2 | SYSTOLIC BLOOD PRESSURE: 110 MMHG | DIASTOLIC BLOOD PRESSURE: 70 MMHG | WEIGHT: 181.6 LBS | HEART RATE: 75 BPM

## 2022-01-26 DIAGNOSIS — F41.8 DEPRESSION WITH ANXIETY: ICD-10-CM

## 2022-01-26 PROCEDURE — 99213 OFFICE O/P EST LOW 20 MIN: CPT | Performed by: PHYSICIAN ASSISTANT

## 2022-01-26 RX ORDER — FLUOXETINE 10 MG/1
10 CAPSULE ORAL DAILY
Qty: 90 CAPSULE | Refills: 1 | Status: SHIPPED | OUTPATIENT
Start: 2022-01-26 | End: 2022-03-15

## 2022-01-26 RX ORDER — DICYCLOMINE HYDROCHLORIDE 10 MG/1
CAPSULE ORAL
Qty: 60 CAPSULE | Refills: 5 | Status: CANCELLED | OUTPATIENT
Start: 2022-01-26

## 2022-01-26 RX ORDER — OMEPRAZOLE 40 MG/1
CAPSULE, DELAYED RELEASE ORAL
Qty: 90 CAPSULE | Refills: 3 | Status: CANCELLED | OUTPATIENT
Start: 2022-01-26

## 2022-01-26 ASSESSMENT — PATIENT HEALTH QUESTIONNAIRE - PHQ9
10. IF YOU CHECKED OFF ANY PROBLEMS, HOW DIFFICULT HAVE THESE PROBLEMS MADE IT FOR YOU TO DO YOUR WORK, TAKE CARE OF THINGS AT HOME, OR GET ALONG WITH OTHER PEOPLE: SOMEWHAT DIFFICULT
SUM OF ALL RESPONSES TO PHQ QUESTIONS 1-9: 5
SUM OF ALL RESPONSES TO PHQ QUESTIONS 1-9: 5

## 2022-01-26 NOTE — PROGRESS NOTES
Answers for HPI/ROS submitted by the patient on 2022  If you checked off any problems, how difficult have these problems made it for you to do your work, take care of things at home, or get along with other people?: Somewhat difficult  PHQ9 TOTAL SCORE: 5      Assessment & Plan     Depression with anxiety  Much improved we will continue with bupropion 300 mg and fluoxetine 10 mg recheck 6 months  - FLUoxetine (PROZAC) 10 MG capsule; Take 1 capsule (10 mg) by mouth daily                 Return in about 6 months (around 2022) for depression/anxiety.    Denise Schulte PA-C  [facility] VINAY Garsia is a 61 year old who presents for the following health issues     History of Present Illness       Mental Health Follow-up:  Patient presents to follow-up on Depression.Patient's depression since last visit has been:  Better  The patient is not having other symptoms associated with depression.      Any significant life events: health concerns  Patient is not feeling anxious or having panic attacks.  Patient has no concerns about alcohol or drug use.     Social History  Tobacco Use    Smoking status: Former Smoker      Packs/day: 0.25      Years: 32.00      Pack years: 8      Start date: 1980      Quit date: 3/4/2012      Years since quittin.9    Smokeless tobacco: Never Used  Vaping Use    Vaping Use: Never used  Alcohol use: No    Comment: socailly  Drug use: No      Today's PHQ-9         PHQ-9 Total Score:     (P) 5   PHQ-9 Q9 Thoughts of better off dead/self-harm past 2 weeks :   (P) Not at all   Thoughts of suicide or self harm:      Self-harm Plan:        Self-harm Action:          Safety concerns for self or others:           She eats 0-1 servings of fruits and vegetables daily.She consumes 0 sweetened beverage(s) daily.She exercises with enough effort to increase her heart rate 20 to 29 minutes per day.  She exercises with enough effort to increase her heart rate 4  days per week.   She is taking medications regularly.       She states she is doing so much better with the addition of fluoxetine. In December we added 10 mg of fluoxetine to her max dose of bupropion, 300 mg. She is not experiencing any side effects but overall feels that her depression is much improved. The increase in sunshine and daily has also been helpful per patient her energy is somewhat better. She had started going to the gym several times a week as well she is already done 11 times this month. She typically swims but does use an elliptical machine or stationary bike. She is excited to go to [place] next week    Review of Systems   See PHQ 9 and MARIAN 7 questionnaires for symptoms.       Objective    /70   Pulse 75   Temp 98.8  F (37.1  C) (Temporal)   Resp 20   Wt 82.4 kg (181 lb 9.6 oz)   SpO2 91%   BMI 32.17 kg/m    Body mass index is 32.17 kg/m .  Physical Exam   GENERAL: healthy, alert and no distress  PSYCH: mentation appears normal, affect normal/bright

## 2022-01-26 NOTE — TELEPHONE ENCOUNTER
Reason for Call:  Medication or medication refill: Pharmacy change    Do you use a [facility]?  Name of the pharmacy and phone number for the current request:  [facility] mail order    Name of the medication requested: All meds    Other request: Patient requesting refills be sent to [facility] as she would like to start using this as her mail order pharmacy.    Can we leave a detailed message on this number? YES    Phone number patient can be reached at: [phone]    Best Time: N/A    Call taken on 1/26/2022 at 4:59 PM by Tracy Marina

## 2022-01-27 ASSESSMENT — PATIENT HEALTH QUESTIONNAIRE - PHQ9: SUM OF ALL RESPONSES TO PHQ QUESTIONS 1-9: 5

## 2022-01-28 ENCOUNTER — OFFICE VISIT (OUTPATIENT)
Dept: FAMILY MEDICINE | Facility: OTHER | Age: 62
End: 2022-01-28
Payer: COMMERCIAL

## 2022-01-28 ENCOUNTER — TELEPHONE (OUTPATIENT)
Dept: FAMILY MEDICINE | Facility: OTHER | Age: 62
End: 2022-01-28

## 2022-01-28 VITALS
DIASTOLIC BLOOD PRESSURE: 80 MMHG | BODY MASS INDEX: 31.53 KG/M2 | TEMPERATURE: 97 F | RESPIRATION RATE: 16 BRPM | WEIGHT: 178 LBS | OXYGEN SATURATION: 97 % | SYSTOLIC BLOOD PRESSURE: 128 MMHG | HEART RATE: 70 BPM

## 2022-01-28 DIAGNOSIS — K57.32 DIVERTICULITIS OF COLON: ICD-10-CM

## 2022-01-28 DIAGNOSIS — R10.32 ABDOMINAL PAIN, LEFT LOWER QUADRANT: Primary | ICD-10-CM

## 2022-01-28 DIAGNOSIS — Z13.6 CARDIOVASCULAR SCREENING; LDL GOAL LESS THAN 160: ICD-10-CM

## 2022-01-28 LAB
ALBUMIN SERPL-MCNC: 3.8 G/DL (ref 3.4–5)
ALBUMIN UR-MCNC: NEGATIVE MG/DL
ALP SERPL-CCNC: 99 U/L (ref 40–150)
ALT SERPL W P-5'-P-CCNC: 24 U/L (ref 0–50)
ANION GAP SERPL CALCULATED.3IONS-SCNC: 5 MMOL/L (ref 3–14)
APPEARANCE UR: CLEAR
AST SERPL W P-5'-P-CCNC: 14 U/L (ref 0–45)
BACTERIA #/AREA URNS HPF: ABNORMAL /HPF
BILIRUB SERPL-MCNC: 0.5 MG/DL (ref 0.2–1.3)
BILIRUB UR QL STRIP: NEGATIVE
BUN SERPL-MCNC: 13 MG/DL (ref 7–30)
CALCIUM SERPL-MCNC: 9.3 MG/DL (ref 8.5–10.1)
CHLORIDE BLD-SCNC: 109 MMOL/L (ref 94–109)
CHOLEST SERPL-MCNC: 193 MG/DL
CO2 SERPL-SCNC: 27 MMOL/L (ref 20–32)
COLOR UR AUTO: YELLOW
CREAT SERPL-MCNC: 0.93 MG/DL (ref 0.52–1.04)
ERYTHROCYTE [DISTWIDTH] IN BLOOD BY AUTOMATED COUNT: 13.3 % (ref 10–15)
FASTING STATUS PATIENT QL REPORTED: NO
GFR SERPL CREATININE-BSD FRML MDRD: 70 ML/MIN/1.73M2
GLUCOSE BLD-MCNC: 92 MG/DL (ref 70–99)
GLUCOSE UR STRIP-MCNC: NEGATIVE MG/DL
HCT VFR BLD AUTO: 42.9 % (ref 35–47)
HDLC SERPL-MCNC: 57 MG/DL
HGB BLD-MCNC: 13.8 G/DL (ref 11.7–15.7)
HGB UR QL STRIP: ABNORMAL
KETONES UR STRIP-MCNC: 15 MG/DL
LDLC SERPL CALC-MCNC: 117 MG/DL
LEUKOCYTE ESTERASE UR QL STRIP: ABNORMAL
MCH RBC QN AUTO: 29.1 PG (ref 26.5–33)
MCHC RBC AUTO-ENTMCNC: 32.2 G/DL (ref 31.5–36.5)
MCV RBC AUTO: 90 FL (ref 78–100)
NITRATE UR QL: NEGATIVE
NONHDLC SERPL-MCNC: 136 MG/DL
PH UR STRIP: 7 [PH] (ref 5–7)
PLATELET # BLD AUTO: 277 10E3/UL (ref 150–450)
POTASSIUM BLD-SCNC: 3.7 MMOL/L (ref 3.4–5.3)
PROT SERPL-MCNC: 7.7 G/DL (ref 6.8–8.8)
RBC # BLD AUTO: 4.75 10E6/UL (ref 3.8–5.2)
RBC #/AREA URNS AUTO: ABNORMAL /HPF
SODIUM SERPL-SCNC: 141 MMOL/L (ref 133–144)
SP GR UR STRIP: 1.02 (ref 1–1.03)
SQUAMOUS #/AREA URNS AUTO: ABNORMAL /LPF
TRIGL SERPL-MCNC: 97 MG/DL
UROBILINOGEN UR STRIP-ACNC: 0.2 E.U./DL
WBC # BLD AUTO: 6.8 10E3/UL (ref 4–11)
WBC #/AREA URNS AUTO: ABNORMAL /HPF

## 2022-01-28 PROCEDURE — 80053 COMPREHEN METABOLIC PANEL: CPT | Performed by: STUDENT IN AN ORGANIZED HEALTH CARE EDUCATION/TRAINING PROGRAM

## 2022-01-28 PROCEDURE — 81001 URINALYSIS AUTO W/SCOPE: CPT | Performed by: STUDENT IN AN ORGANIZED HEALTH CARE EDUCATION/TRAINING PROGRAM

## 2022-01-28 PROCEDURE — 36415 COLL VENOUS BLD VENIPUNCTURE: CPT | Performed by: STUDENT IN AN ORGANIZED HEALTH CARE EDUCATION/TRAINING PROGRAM

## 2022-01-28 PROCEDURE — 80061 LIPID PANEL: CPT | Performed by: STUDENT IN AN ORGANIZED HEALTH CARE EDUCATION/TRAINING PROGRAM

## 2022-01-28 PROCEDURE — 99214 OFFICE O/P EST MOD 30 MIN: CPT | Performed by: STUDENT IN AN ORGANIZED HEALTH CARE EDUCATION/TRAINING PROGRAM

## 2022-01-28 PROCEDURE — 85027 COMPLETE CBC AUTOMATED: CPT | Performed by: STUDENT IN AN ORGANIZED HEALTH CARE EDUCATION/TRAINING PROGRAM

## 2022-01-28 RX ORDER — HYDROCODONE BITARTRATE AND ACETAMINOPHEN 5; 325 MG/1; MG/1
1 TABLET ORAL EVERY 6 HOURS PRN
Qty: 10 TABLET | Refills: 0 | Status: SHIPPED | OUTPATIENT
Start: 2022-01-28 | End: 2022-01-31

## 2022-01-28 ASSESSMENT — PAIN SCALES - GENERAL: PAINLEVEL: SEVERE PAIN (7)

## 2022-01-28 NOTE — TELEPHONE ENCOUNTER
Sorry for the confusion. I changed the amount of days that was needed      Thank you,    Scott Pryor MD

## 2022-01-28 NOTE — TELEPHONE ENCOUNTER
Pharmacy calling.   Patient seen today 1/28  Prescription sent for Augmentin is a 3.5 day supply. Please clarify if this is correct, or if you want 3 days or 4?    Patient is currently at the pharmacy waiting to pick this up.    Thanks,  Mara OBREGONN, RN

## 2022-01-28 NOTE — TELEPHONE ENCOUNTER
New med was e-prescribed. Pharmacy was closed for lunch-unable to update them. Closing encounter.     Jena Manzanares, SABASN, RN, PHN  Registered Nurse-Clinic Triage  [facility]  1/28/2022 at 1:56 PM

## 2022-01-28 NOTE — PROGRESS NOTES
Assessment & Plan     Abdominal pain, left lower quadrant  Diverticulitis of colon  Most likely does have a diverticulitis flareup again.  I would still like to rule out any other abdominal pathology though.  Lab work pending.  She does not have an acute abdomen or any concerning systemic symptoms.  I did stress to her though that a CT scan may be needed, including at any time in the near future including today.  I encouraged her to be seen in the ER or more urgently if things worsen with her pain.  She does understand this.  Overall I do not think she is acute enough to be sent into the ER right now.   In the meantime will start treatment for diverticulitis and will let her know about her results.  We will also send her to general surgery for recurrent bouts of diverticulitis for possible partial colectomy.    - UA Macro with Reflex to Micro and Culture - lab collect  - CBC with platelets  - Comprehensive metabolic panel  - amoxicillin-clavulanate (AUGMENTIN) 875-125 MG tablet; Take 1 tablet by mouth 2 times daily  - HYDROcodone-acetaminophen (NORCO) 5-325 MG tablet; Take 1 tablet by mouth every 6 hours as needed for moderate to severe pain or severe pain  - Adult General Surg Referral; Future      LESLIE RIVERA MD  [facility] is a 61 year old who presents for the following health issues     HPI     Diverticulitis flare up, blood in stool.  Left lower quadrant pain.  Pain is fairly resolved at this time.  This is a similar course that she has had in the past with her previous diverticulitis flareups.  Last episode was June 2021.  Denies any fevers or chills.  Previously was put on Augmentin which worked well to resolve her symptoms.        Review of Systems   Constitutional, HEENT, cardiovascular, pulmonary, gi and gu systems are negative, except as otherwise noted.      Objective    /80   Pulse 70   Temp 97  F (36.1  C) (Temporal)   Resp 16   Wt  80.7 kg (178 lb)   SpO2 97%   BMI 31.53 kg/m    Body mass index is 31.53 kg/m .  Physical Exam  Vitals and nursing note reviewed.   Constitutional:       General: She is not in acute distress.     Appearance: Normal appearance. She is not ill-appearing, toxic-appearing or diaphoretic.   HENT:      Head: Normocephalic and atraumatic.      Right Ear: Tympanic membrane, ear canal and external ear normal. There is no impacted cerumen.      Left Ear: Tympanic membrane, ear canal and external ear normal. There is no impacted cerumen.      Nose: Nose normal. No congestion or rhinorrhea.      Mouth/Throat:      Mouth: Mucous membranes are moist.      Pharynx: Oropharynx is clear. No oropharyngeal exudate or posterior oropharyngeal erythema.   Eyes:      General:         Right eye: No discharge.         Left eye: No discharge.      Extraocular Movements: Extraocular movements intact.      Conjunctiva/sclera: Conjunctivae normal.      Pupils: Pupils are equal, round, and reactive to light.   Cardiovascular:      Rate and Rhythm: Normal rate and regular rhythm.      Heart sounds: No murmur heard.      Pulmonary:      Effort: Pulmonary effort is normal. No respiratory distress.      Breath sounds: Normal breath sounds.   Abdominal:      General: Bowel sounds are normal. There is no distension.      Palpations: Abdomen is soft. There is no mass.      Tenderness: There is abdominal tenderness (mainly LLQ). There is no guarding or rebound.      Hernia: No hernia is present.   Musculoskeletal:         General: Normal range of motion.      Cervical back: Normal range of motion.   Lymphadenopathy:      Cervical: No cervical adenopathy.   Neurological:      Mental Status: She is alert.   Psychiatric:         Mood and Affect: Mood normal.         Behavior: Behavior normal.         Thought Content: Thought content normal.

## 2022-02-01 ENCOUNTER — MYC MEDICAL ADVICE (OUTPATIENT)
Dept: FAMILY MEDICINE | Facility: OTHER | Age: 62
End: 2022-02-01
Payer: COMMERCIAL

## 2022-02-01 ENCOUNTER — TELEPHONE (OUTPATIENT)
Dept: FAMILY MEDICINE | Facility: OTHER | Age: 62
End: 2022-02-01
Payer: COMMERCIAL

## 2022-02-01 NOTE — TELEPHONE ENCOUNTER
Left message to return call, see below      Team - please call patient with results.  They are looking good and her pain is most likely from her diverticulitis. Ask her how her pain is doing today.      Thank you,     Rosendo Pryor MD

## 2022-02-15 ENCOUNTER — OFFICE VISIT (OUTPATIENT)
Dept: SURGERY | Facility: CLINIC | Age: 62
End: 2022-02-15
Attending: STUDENT IN AN ORGANIZED HEALTH CARE EDUCATION/TRAINING PROGRAM
Payer: COMMERCIAL

## 2022-02-15 VITALS
WEIGHT: 174 LBS | DIASTOLIC BLOOD PRESSURE: 78 MMHG | BODY MASS INDEX: 30.83 KG/M2 | OXYGEN SATURATION: 92 % | SYSTOLIC BLOOD PRESSURE: 122 MMHG | HEIGHT: 63 IN | HEART RATE: 72 BPM

## 2022-02-15 DIAGNOSIS — R10.32 ABDOMINAL PAIN, LEFT LOWER QUADRANT: ICD-10-CM

## 2022-02-15 DIAGNOSIS — K57.32 DIVERTICULITIS OF COLON: ICD-10-CM

## 2022-02-15 PROCEDURE — 99204 OFFICE O/P NEW MOD 45 MIN: CPT | Performed by: SURGERY

## 2022-02-15 ASSESSMENT — MIFFLIN-ST. JEOR: SCORE: 1333.39

## 2022-02-15 NOTE — PROGRESS NOTES
I was asked to see Sun De La Garza for diverticulitis by Denise Schulte    HPI:  Patient is a 61 year old female with complaints of diarrhea and abdominal pain. The pain usually proceeds the diarrhea. She has has had multiple boats up to once a month. Pt reports having had 50 boats of diverticulitis. She has been hospitalized at least once a year. She has fevers associated with her attacks. Attacks last from   She has missed work on average every other month and usually misses between 2-3 days. Attacks usually get better after antibiotics, they used to get better without antibiotics. She is not having any current symptoms.    Review Of Systems  10 point ROS neg other than the symptoms noted above in the HPI    No past medical history on file.    Past Surgical History:   Procedure Laterality Date     COLONOSCOPY N/A 2014    Procedure: COLONOSCOPY;  Surgeon: Mj Morales MD;  Location:  GI     ESOPHAGOSCOPY, GASTROSCOPY, DUODENOSCOPY (EGD), COMBINED N/A 2014    Procedure: COMBINED ESOPHAGOSCOPY, GASTROSCOPY, DUODENOSCOPY (EGD), BIOPSY SINGLE OR MULTIPLE;  Surgeon: Mj Morales MD;  Location:  GI     GYN SURGERY      ablation       Social History     Socioeconomic History     Marital status: Single     Spouse name: Not on file     Number of children: Not on file     Years of education: Not on file     Highest education level: Not on file   Occupational History     Not on file   Tobacco Use     Smoking status: Former Smoker     Packs/day: 0.25     Years: 32.00     Pack years: 8.00     Start date: 1980     Quit date: 3/4/2012     Years since quittin.9     Smokeless tobacco: Never Used   Vaping Use     Vaping Use: Never used   Substance and Sexual Activity     Alcohol use: No     Comment: socailly     Drug use: No     Sexual activity: Not Currently     Partners: Male   Other Topics Concern     Parent/sibling w/ CABG, MI or angioplasty before 65F 55M? Not Asked   Social History Narrative  "    Not on file     Social Determinants of Health     Financial Resource Strain: Not on file   Food Insecurity: Not on file   Transportation Needs: Not on file   Physical Activity: Not on file   Stress: Not on file   Social Connections: Not on file   Intimate Partner Violence: Not on file   Housing Stability: Not on file       Current Outpatient Medications   Medication Sig Dispense Refill     aspirin 81 MG tablet Take 1 tablet (81 mg) by mouth daily 30 tablet      buPROPion (WELLBUTRIN XL) 300 MG 24 hr tablet TAKE 1 TABLET BY MOUTH IN  THE MORNING 90 tablet 3     Calcium Carbonate-Vitamin D (CALCIUM 500 + D PO)        dicyclomine (BENTYL) 10 MG capsule TAKE 1 CAPSULE FOUR TIMES A DAY BEFORE MEALS AND NIGHTLY 60 capsule 5     FLUoxetine (PROZAC) 10 MG capsule Take 1 capsule (10 mg) by mouth daily 90 capsule 1     omeprazole (PRILOSEC) 40 MG DR capsule TAKE 1 CAPSULE BY MOUTH  DAILY 30-60 MINUTES BEFORE  A MEAL. 90 capsule 3     Prenatal Vit-Fe Fumarate-FA (PRENATAL VITAMIN PO) Take 1 tablet by mouth daily         Medications and history reviewed    Physical exam:  Vitals: /78   Pulse 72   Ht 1.6 m (5' 3\")   Wt 78.9 kg (174 lb)   SpO2 92%   BMI 30.82 kg/m    BMI= Body mass index is 30.82 kg/m .    Constitutional: healthy, alert and no distress  Eyes: Pupils round and equal, no icterus   ENT: Mucous membranes moist  Respiratory:  Non-labored respiration  Gastrointestinal: Abdomen soft, non-tender. BS normal. No masses, organomegaly  Musculoskeletal: No gross deformity  Neurologic: No gross focal deficits  Psychiatric: mentation appears normal and affect normal/bright  Hematologic/Lymphatic/Immunologic: No bruising noted  Skin: No lesions, rashes, erythema or jaundice noted    Labs  UA, CBC, BMP reviewed    Imaging  CT reviewed    Assessment:     ICD-10-CM    1. Abdominal pain, left lower quadrant  R10.32 Adult General Surg Referral   2. Diverticulitis of colon  K57.32 Adult General Surg Referral     Plan: " The risks benefits and alternatives of surgery were discussed with the patient including but not limited to pain, bleeding, infection, risks of general anesthesia (i.e. MI, CVA, PE, and death), and cosmetic deformity. Additionally we discussed the risk of recurrence leak, etc.     We discussed colonoscopy at length - will do MAC and she will call insurance about low volume prep and call us with the name of one if covered if not covered will prescribe suprep or moviprep based on which one she can get cheaper. Will Do split prep.     Will get colonoscopy and then lpan for surgery. Will meet again after colonoscopy     46 minutes spent on the date of the encounter doing chart review, review of test results, interpretation of tests, patient visit, and documentation           Zeferino Awan, DO

## 2022-02-15 NOTE — LETTER
2/15/2022         RE: Sun De La Garza  [id number] [address] [facility] [id number]        Dear Colleague,    Thank you for referring your patient, Sun De La Garza, to the [facility]. Please see a copy of my visit note below.    I was asked to see Sun De La Garza for diverticulitis by Denise Schulte    HPI:  Patient is a 61 year old female with complaints of diarrhea and abdominal pain. The pain usually proceeds the diarrhea. She has has had multiple boats up to once a month. Pt reports having had 50 boats of diverticulitis. She has been hospitalized at least once a year. She has fevers associated with her attacks. Attacks last from   She has missed work on average every other month and usually misses between 2-3 days. Attacks usually get better after antibiotics, they used to get better without antibiotics. She is not having any current symptoms.    Review Of Systems  10 point ROS neg other than the symptoms noted above in the HPI    No past medical history on file.    Past Surgical History:   Procedure Laterality Date     COLONOSCOPY N/A 2014    Procedure: COLONOSCOPY;  Surgeon: Mj Morales MD;  Location:  GI     ESOPHAGOSCOPY, GASTROSCOPY, DUODENOSCOPY (EGD), COMBINED N/A 2014    Procedure: COMBINED ESOPHAGOSCOPY, GASTROSCOPY, DUODENOSCOPY (EGD), BIOPSY SINGLE OR MULTIPLE;  Surgeon: Mj Morales MD;  Location:  GI     GYN SURGERY      ablation       Social History     Socioeconomic History     Marital status: Single     Spouse name: Not on file     Number of children: Not on file     Years of education: Not on file     Highest education level: Not on file   Occupational History     Not on file   Tobacco Use     Smoking status: Former Smoker     Packs/day: 0.25     Years: 32.00     Pack years: 8.00     Start date: 1980     Quit date: 3/4/2012     Years since quittin.9     Smokeless tobacco: Never Used   Vaping Use     Vaping Use: Never used  "  Substance and Sexual Activity     Alcohol use: No     Comment: socailly     Drug use: No     Sexual activity: Not Currently     Partners: Male   Other Topics Concern     Parent/sibling w/ CABG, MI or angioplasty before 65F 55M? Not Asked   Social History Narrative     Not on file     Social Determinants of Health     Financial Resource Strain: Not on file   Food Insecurity: Not on file   Transportation Needs: Not on file   Physical Activity: Not on file   Stress: Not on file   Social Connections: Not on file   Intimate Partner Violence: Not on file   Housing Stability: Not on file       Current Outpatient Medications   Medication Sig Dispense Refill     aspirin 81 MG tablet Take 1 tablet (81 mg) by mouth daily 30 tablet      buPROPion (WELLBUTRIN XL) 300 MG 24 hr tablet TAKE 1 TABLET BY MOUTH IN  THE MORNING 90 tablet 3     Calcium Carbonate-Vitamin D (CALCIUM 500 + D PO)        dicyclomine (BENTYL) 10 MG capsule TAKE 1 CAPSULE FOUR TIMES A DAY BEFORE MEALS AND NIGHTLY 60 capsule 5     FLUoxetine (PROZAC) 10 MG capsule Take 1 capsule (10 mg) by mouth daily 90 capsule 1     omeprazole (PRILOSEC) 40 MG DR capsule TAKE 1 CAPSULE BY MOUTH  DAILY 30-60 MINUTES BEFORE  A MEAL. 90 capsule 3     Prenatal Vit-Fe Fumarate-FA (PRENATAL VITAMIN PO) Take 1 tablet by mouth daily         Medications and history reviewed    Physical exam:  Vitals: /78   Pulse 72   Ht 1.6 m (5' 3\")   Wt 78.9 kg (174 lb)   SpO2 92%   BMI 30.82 kg/m    BMI= Body mass index is 30.82 kg/m .    Constitutional: healthy, alert and no distress  Eyes: Pupils round and equal, no icterus   ENT: Mucous membranes moist  Respiratory:  Non-labored respiration  Gastrointestinal: Abdomen soft, non-tender. BS normal. No masses, organomegaly  Musculoskeletal: No gross deformity  Neurologic: No gross focal deficits  Psychiatric: mentation appears normal and affect normal/bright  Hematologic/Lymphatic/Immunologic: No bruising noted  Skin: No lesions, " rashes, erythema or jaundice noted    Labs  UA, CBC, BMP reviewed    Imaging  CT reviewed    Assessment:     ICD-10-CM    1. Abdominal pain, left lower quadrant  R10.32 Adult General Surg Referral   2. Diverticulitis of colon  K57.32 Adult General Surg Referral     Plan: The risks benefits and alternatives of surgery were discussed with the patient including but not limited to pain, bleeding, infection, risks of general anesthesia (i.e. MI, CVA, PE, and death), and cosmetic deformity. Additionally we discussed the risk of recurrence leak, etc.     We discussed colonoscopy at length - will do MAC and she will call insurance about low volume prep and call us with the name of one if covered if not covered will prescribe suprep or moviprep based on which one she can get cheaper. Will Do split prep.     Will get colonoscopy and then lpan for surgery. Will meet again after colonoscopy     46 minutes spent on the date of the encounter doing chart review, review of test results, interpretation of tests, patient visit, and documentation           Zeferino Awan, DO       Again, thank you for allowing me to participate in the care of your patient.        Sincerely,        Zeferino Awan, DO

## 2022-02-28 ENCOUNTER — TELEPHONE (OUTPATIENT)
Dept: GASTROENTEROLOGY | Facility: CLINIC | Age: 62
End: 2022-02-28
Payer: COMMERCIAL

## 2022-02-28 NOTE — TELEPHONE ENCOUNTER
Screening Questions  BlueKIND OF PREP RedLOCATION [review exclusion criteria] GreenSEDATION TYPE    1. Have you had a positive covid test in the last 90 days? N     2. Are you active on mychart? Y    3. What insurance is in the chart? HP     3.  Ordering/Referring Provider: TRE    4. BMI 30.3 [BMI OVER 40-EXTENDED PREP]  If greater than 40 review exclusion criteria [PAC APPT IF @ [facility]]    5.  Respiratory Screening :  [If yes to any of the following HOSPITAL setting only]     Do you use daily home oxygen? N    Do you have mod to severe Obstructive Sleep Apnea? Y- USES CPAP  [OKAY @ [facility] SH PH RI]   Do you have Pulmonary Hypertension? N     Do you have UNCONTROLLED asthma? N      6. Have you had a heart or lung transplant? N      7. Are you currently on dialysis? N [ If yes, G-PREP & HOSPITAL setting only]     8. Do you have chronic kidney disease? N [ If yes, G-PREP ]    9. Have you had a stroke or Transient ischemic attack (TIA) within 6 months? N (If yes, do not schedule at [facility])    10. In the past 6 months, have you had any heart related issues including cardiomyopathy or heart attack? N        If yes, did it require cardiac stenting or other implantable device?       11. Do you have any implantable devices in your body (pacemaker, defib, LVAD)? N (If yes, schedule at [facility])    12. Do you take nitroglycerin? N   If yes, how often?   (if yes, HOSPITAL setting ONLY)    13. Are you currently taking any blood thinners? N   [IF YES, INFORM PATIENT TO FOLLOW UP W/ ORDERING PROVIDER FOR BRIDGING INSTRUCTIONS]     14. Are you a diabetic? N   [ If yes, G-PREP ]    15. [FEMALES] Are you currently pregnant?     If yes, how many weeks?     16. Are you taking any prescription pain medications on a routine schedule?  N  [ If yes, EXTENDED PREP.]    17. Do you have any chemical dependencies such as alcohol, street drugs, or methadone?  N [If yes, MAC]    18. Do you have any history of post-traumatic stress syndrome,  severe anxiety or history of psychosis?  N  [If yes, MAC]    19. Do you transfer independently?  Y    20.  Do you have any issues with constipation?  N  [ If yes, EXTENDED PREP.]    21. Preferred LOCAL Pharmacy for Pre Prescription     [facility] [facility] [address] [facility]  Scheduling Details      Caller : Sun De La Garza  (Please ask for phone number if not scheduled by patient)    Type of Procedure Scheduled: COLON  Which Colonoscopy Prep was Sent?: MPREP      2/28: [facility] TO [facility] TO SEE IF PATIENT CAN BE SEEN THERE DESPITE PATRICA

## 2022-03-03 ENCOUNTER — TELEPHONE (OUTPATIENT)
Dept: GASTROENTEROLOGY | Facility: CLINIC | Age: 62
End: 2022-03-03
Payer: COMMERCIAL

## 2022-03-03 NOTE — TELEPHONE ENCOUNTER
[facility]    Reason for Call: Other: Message to let patient know she's been cleared to be seen by Dr Awan @ instead of . Time being held on 4/6/22 for her. Left scheduling line and asked her to call us back. Covid test will need rescheduling as well.         ----- Message from Anya Martinez sent at 3/3/2022 12:26 PM CST -----  Regarding: RE: Scheduling colonoscopy  Hi Ivette,    This patient is cleared to come to [facility].  I have some MAC block time held for Dr. Awan on April 6th in the afternoon.  Please call me if this does not work with the patient and we can work out a different date.    Thank you,  Anya  ----- Message -----  From: Ivette Guzman  Sent: 2/28/2022   4:28 PM CST  To: Anya Martinez  Subject: RE: Scheduling colonoscopy                       Mo Joyner,     Thank you for your assistance with this. This would be MAC sedation.    Thank you,  Ivette  ----- Message -----  From: Anya Martinez  Sent: 2/28/2022   4:09 PM CST  To: Zeferino Marmolejo DO, #  Subject: RE: Scheduling colonoscopy                       Hi Ivette,    PATRICA is not a hard stop itself but should be reviewed by anesthesia.  I will put this for review and let you know what is decided if she can be done here or not.  Was this also a MAC request?  I am including Edel Simental on this message as she is also involved a lot with answering endo questions and scheduling when I am not in the office.  I am off the next couple days so she can respond once the review comes back and help facilitate a plan moving forward.    Thank you,  Anya Martinez  ----- Message -----  From: Ivette Guzman  Sent: 2/28/2022   3:45 PM CST  To: Zeferino Miller DO  Subject: Scheduling colonoscopy                           Dr Awan and Anya,    I'm attempting to schedule Sun for her colonoscopy. She has PATRICA so I was looking at availability at [facility]. I wanted  to see how you'd like me to proceed. Would it be acceptable to schedule her at [facility]? And if so, would there be other scheduling options? The times being held on 3/30 have been filled.    Thank you very much,  Ivette Guzman  Endoscopy Scheduling

## 2022-03-03 NOTE — TELEPHONE ENCOUNTER
Left message for Bernadette to call back and schedule now with MAC with  at  due to patient can only do Monday mornings due to her new work schedule and not being able to get off. Please see notes in chart that it has been okayed. Will need to reschedule covid test as well once new date and time is scheduled for patient.     Anya Martinez Lezli  Caller: Unspecified (Today,  1:11 PM)  Dr. Gunter has Monday MAC blocks in the AM.  If she needs a Monday morning with MAC she could schedule with Dr. Gunter in one of his MAC slots.  I know Dr. Awan was the ordering MD but sometimes if patients have little flexibility, we have to offer another provider.  I will remove the other hold I had for her.     Thanks,   Anya             Previous Messages

## 2022-03-03 NOTE — TELEPHONE ENCOUNTER
Sun Winters did call back and I gave her info from below but she hates to be like this she can not do any other days but Monday mornings as she just started a new job. Is there a possibility that  could do a Monday with Olaf?   I told her we would have to call her back. She may have to go to another location then to get it to work for her.    Thank you!  Susu-scheduling

## 2022-03-04 ENCOUNTER — TELEPHONE (OUTPATIENT)
Dept: GASTROENTEROLOGY | Facility: CLINIC | Age: 62
End: 2022-03-04
Payer: COMMERCIAL

## 2022-03-04 NOTE — TELEPHONE ENCOUNTER
Caller: Sun De La Garza    Procedure: Colon    Date, Location, and Surgeon of Procedure Cancelled: 4/18, Chiara Vanegas    Ordering Provider: Zeferino Awan, DO     Reason for cancel (please be detailed, any staff messages or encounters to note?): See Previous TE in chart notes        Rescheduled: Y     If rescheduled:    Date: 5/16   Location:    Note any change or update to original order/sedation:

## 2022-03-05 DIAGNOSIS — F41.8 DEPRESSION WITH ANXIETY: ICD-10-CM

## 2022-03-05 DIAGNOSIS — K21.00 GASTROESOPHAGEAL REFLUX DISEASE WITH ESOPHAGITIS WITHOUT HEMORRHAGE: ICD-10-CM

## 2022-03-05 NOTE — TELEPHONE ENCOUNTER
Reason for Call:  Medication or medication refill:omeprazole fluoxetine called in to her ins for the mail order prescription. please re send or call her back so pt can get meds asap    Do you use a [facility]?  Name of the pharmacy and phone number for the current request:  Pt doesn't recall where you sent the mail order for those meds?    Name of the medication requested: see above     Other request: you can send to [facility] so pt can get her meds but she needs them through the mail in program you sent these to before??    Can we leave a detailed message on this number? yes    Phone number patient can be reached at: [phone]    Best Time: ASAP    Call taken on 3/5/2022 at 9:05 AM by Louisa Lowe

## 2022-03-07 DIAGNOSIS — Z11.59 ENCOUNTER FOR SCREENING FOR OTHER VIRAL DISEASES: Primary | ICD-10-CM

## 2022-03-07 NOTE — TELEPHONE ENCOUNTER
Please call patient, these meds were originally sent to [facility] in [place].  Which mail order does she use?  We have 2 listed on her record.  Once I know which mail order to send it to I will fax them to her mail order pharmacy.  She should have refills available for the fluoxetine and omeprazole at [facility] in [place] right now though she would need to call to have them filled  Denise Schulte PA-C

## 2022-03-14 ENCOUNTER — MYC MEDICAL ADVICE (OUTPATIENT)
Dept: FAMILY MEDICINE | Facility: OTHER | Age: 62
End: 2022-03-14
Payer: COMMERCIAL

## 2022-03-14 NOTE — TELEPHONE ENCOUNTER
Called and left a Voicemail to have her give us a call back regarding mail order.     Nadia Harvey, CMA

## 2022-03-15 RX ORDER — OMEPRAZOLE 40 MG/1
CAPSULE, DELAYED RELEASE ORAL
Qty: 90 CAPSULE | Refills: 3 | Status: SHIPPED | OUTPATIENT
Start: 2022-03-15 | End: 2023-07-25

## 2022-03-15 RX ORDER — FLUOXETINE 10 MG/1
10 CAPSULE ORAL DAILY
Qty: 90 CAPSULE | Refills: 1 | Status: SHIPPED | OUTPATIENT
Start: 2022-03-15 | End: 2023-09-21

## 2022-04-29 ENCOUNTER — ALLIED HEALTH/NURSE VISIT (OUTPATIENT)
Dept: FAMILY MEDICINE | Facility: OTHER | Age: 62
End: 2022-04-29
Payer: COMMERCIAL

## 2022-04-29 ENCOUNTER — HOSPITAL ENCOUNTER (EMERGENCY)
Facility: CLINIC | Age: 62
Discharge: HOME | End: 2022-04-29
Attending: EMERGENCY MEDICINE | Admitting: EMERGENCY MEDICINE
Payer: COMMERCIAL

## 2022-04-29 ENCOUNTER — APPOINTMENT (OUTPATIENT)
Dept: CT IMAGING | Facility: CLINIC | Age: 62
End: 2022-04-29
Attending: EMERGENCY MEDICINE
Payer: COMMERCIAL

## 2022-04-29 VITALS
TEMPERATURE: 97.7 F | HEIGHT: 63 IN | RESPIRATION RATE: 16 BRPM | DIASTOLIC BLOOD PRESSURE: 64 MMHG | WEIGHT: 171 LBS | OXYGEN SATURATION: 95 % | HEART RATE: 81 BPM | SYSTOLIC BLOOD PRESSURE: 106 MMHG | BODY MASS INDEX: 30.3 KG/M2

## 2022-04-29 DIAGNOSIS — R10.32 ABDOMINAL PAIN, LEFT LOWER QUADRANT: ICD-10-CM

## 2022-04-29 DIAGNOSIS — Z78.9 TRIAGE ASSESSMENT CLASS 2, URGENT: Primary | ICD-10-CM

## 2022-04-29 LAB
ALBUMIN SERPL-MCNC: 3.6 G/DL (ref 3.4–5)
ALBUMIN UR-MCNC: NEGATIVE MG/DL
ALP SERPL-CCNC: 94 U/L (ref 40–150)
ALT SERPL W P-5'-P-CCNC: 26 U/L (ref 0–50)
ANION GAP SERPL CALCULATED.3IONS-SCNC: 4 MMOL/L (ref 3–14)
APPEARANCE UR: CLEAR
AST SERPL W P-5'-P-CCNC: 17 U/L (ref 0–45)
BASOPHILS # BLD AUTO: 0.1 10E3/UL (ref 0–0.2)
BASOPHILS NFR BLD AUTO: 1 %
BILIRUB SERPL-MCNC: 0.4 MG/DL (ref 0.2–1.3)
BILIRUB UR QL STRIP: NEGATIVE
BUN SERPL-MCNC: 13 MG/DL (ref 7–30)
CALCIUM SERPL-MCNC: 9 MG/DL (ref 8.5–10.1)
CHLORIDE BLD-SCNC: 110 MMOL/L (ref 94–109)
CO2 SERPL-SCNC: 30 MMOL/L (ref 20–32)
COLOR UR AUTO: COLORLESS
CREAT SERPL-MCNC: 0.95 MG/DL (ref 0.52–1.04)
EOSINOPHIL # BLD AUTO: 0.3 10E3/UL (ref 0–0.7)
EOSINOPHIL NFR BLD AUTO: 5 %
ERYTHROCYTE [DISTWIDTH] IN BLOOD BY AUTOMATED COUNT: 13 % (ref 10–15)
GFR SERPL CREATININE-BSD FRML MDRD: 69 ML/MIN/1.73M2
GLUCOSE BLD-MCNC: 126 MG/DL (ref 70–99)
GLUCOSE UR STRIP-MCNC: NEGATIVE MG/DL
HCT VFR BLD AUTO: 40.9 % (ref 35–47)
HGB BLD-MCNC: 13.5 G/DL (ref 11.7–15.7)
HGB UR QL STRIP: NEGATIVE
IMM GRANULOCYTES # BLD: 0 10E3/UL
IMM GRANULOCYTES NFR BLD: 0 %
KETONES UR STRIP-MCNC: NEGATIVE MG/DL
LEUKOCYTE ESTERASE UR QL STRIP: NEGATIVE
LIPASE SERPL-CCNC: 127 U/L (ref 73–393)
LYMPHOCYTES # BLD AUTO: 2.6 10E3/UL (ref 0.8–5.3)
LYMPHOCYTES NFR BLD AUTO: 35 %
MCH RBC QN AUTO: 29.4 PG (ref 26.5–33)
MCHC RBC AUTO-ENTMCNC: 33 G/DL (ref 31.5–36.5)
MCV RBC AUTO: 89 FL (ref 78–100)
MONOCYTES # BLD AUTO: 0.6 10E3/UL (ref 0–1.3)
MONOCYTES NFR BLD AUTO: 8 %
NEUTROPHILS # BLD AUTO: 3.7 10E3/UL (ref 1.6–8.3)
NEUTROPHILS NFR BLD AUTO: 51 %
NITRATE UR QL: NEGATIVE
NRBC # BLD AUTO: 0 10E3/UL
NRBC BLD AUTO-RTO: 0 /100
PH UR STRIP: 6 [PH] (ref 5–7)
PLATELET # BLD AUTO: 271 10E3/UL (ref 150–450)
POTASSIUM BLD-SCNC: 3.6 MMOL/L (ref 3.4–5.3)
PROT SERPL-MCNC: 7.3 G/DL (ref 6.8–8.8)
RBC # BLD AUTO: 4.59 10E6/UL (ref 3.8–5.2)
RBC URINE: 0 /HPF
SODIUM SERPL-SCNC: 144 MMOL/L (ref 133–144)
SP GR UR STRIP: 1 (ref 1–1.03)
SQUAMOUS EPITHELIAL: <1 /HPF
UROBILINOGEN UR STRIP-MCNC: NORMAL MG/DL
WBC # BLD AUTO: 7.2 10E3/UL (ref 4–11)
WBC URINE: 0 /HPF

## 2022-04-29 PROCEDURE — 99285 EMERGENCY DEPT VISIT HI MDM: CPT | Mod: 25 | Performed by: EMERGENCY MEDICINE

## 2022-04-29 PROCEDURE — 96374 THER/PROPH/DIAG INJ IV PUSH: CPT | Mod: 59 | Performed by: EMERGENCY MEDICINE

## 2022-04-29 PROCEDURE — 80053 COMPREHEN METABOLIC PANEL: CPT | Performed by: EMERGENCY MEDICINE

## 2022-04-29 PROCEDURE — 250N000009 HC RX 250: Performed by: EMERGENCY MEDICINE

## 2022-04-29 PROCEDURE — 258N000003 HC RX IP 258 OP 636: Performed by: EMERGENCY MEDICINE

## 2022-04-29 PROCEDURE — 74177 CT ABD & PELVIS W/CONTRAST: CPT

## 2022-04-29 PROCEDURE — 250N000011 HC RX IP 250 OP 636: Performed by: EMERGENCY MEDICINE

## 2022-04-29 PROCEDURE — 99284 EMERGENCY DEPT VISIT MOD MDM: CPT | Performed by: EMERGENCY MEDICINE

## 2022-04-29 PROCEDURE — 81001 URINALYSIS AUTO W/SCOPE: CPT | Performed by: EMERGENCY MEDICINE

## 2022-04-29 PROCEDURE — 96361 HYDRATE IV INFUSION ADD-ON: CPT | Performed by: EMERGENCY MEDICINE

## 2022-04-29 PROCEDURE — 83690 ASSAY OF LIPASE: CPT | Performed by: EMERGENCY MEDICINE

## 2022-04-29 PROCEDURE — 85025 COMPLETE CBC W/AUTO DIFF WBC: CPT | Performed by: EMERGENCY MEDICINE

## 2022-04-29 PROCEDURE — 99207 PR NO CHARGE NURSE ONLY: CPT

## 2022-04-29 PROCEDURE — 36415 COLL VENOUS BLD VENIPUNCTURE: CPT | Performed by: EMERGENCY MEDICINE

## 2022-04-29 RX ORDER — IOPAMIDOL 755 MG/ML
500 INJECTION, SOLUTION INTRAVASCULAR ONCE
Status: COMPLETED | OUTPATIENT
Start: 2022-04-29 | End: 2022-04-29

## 2022-04-29 RX ORDER — HYDROMORPHONE HYDROCHLORIDE 1 MG/ML
0.5 INJECTION, SOLUTION INTRAMUSCULAR; INTRAVENOUS; SUBCUTANEOUS
Status: DISCONTINUED | OUTPATIENT
Start: 2022-04-29 | End: 2022-04-29 | Stop reason: HOSPADM

## 2022-04-29 RX ORDER — SODIUM CHLORIDE 9 MG/ML
INJECTION, SOLUTION INTRAVENOUS CONTINUOUS
Status: DISCONTINUED | OUTPATIENT
Start: 2022-04-29 | End: 2022-04-29 | Stop reason: HOSPADM

## 2022-04-29 RX ORDER — ONDANSETRON 2 MG/ML
4 INJECTION INTRAMUSCULAR; INTRAVENOUS EVERY 30 MIN PRN
Status: DISCONTINUED | OUTPATIENT
Start: 2022-04-29 | End: 2022-04-29 | Stop reason: HOSPADM

## 2022-04-29 RX ADMIN — ONDANSETRON 4 MG: 2 INJECTION INTRAMUSCULAR; INTRAVENOUS at 11:40

## 2022-04-29 RX ADMIN — SODIUM CHLORIDE 1000 ML: 9 INJECTION, SOLUTION INTRAVENOUS at 10:58

## 2022-04-29 RX ADMIN — SODIUM CHLORIDE 60 ML: 9 INJECTION, SOLUTION INTRAVENOUS at 11:18

## 2022-04-29 RX ADMIN — IOPAMIDOL 85 ML: 755 INJECTION, SOLUTION INTRAVENOUS at 11:19

## 2022-04-29 NOTE — PROGRESS NOTES
Patient walked in today with concerns of ongoing abdominal pain.     States ongoing for the last 20 plus years.     States she has Diverticulitis and is having a colonoscopy on 5/16    Was told surgery may be an option. Has flare-ups.    Pain is in the LLQ, this AM it was in the RLQ and moved over. Was on and off yesterday and today is now constant.     States it starts with the pain and then turns into diarrhea with rectal bleeding.    Also states she has heart burn and feels like something is stuck in her throat. Had this quite some time ago and was thought to be one of her vitamins lodged in there.    Patient states she wants a CT scan.    Advised patient to be seen in emergency room, as we do not do these in clinic and would need to be ordered. Not sure if it would get done today.     Patient states understanding and states she preferred to go to ED anyway's and almost went there rather than coming into clinic.     No availability in clinic today. Ran this by [facility] and states no availability in clinic and ED for possible CT scan if this is what patient is requesting.     Patient agrees and will head over to emergency room at [facility].     Angela Palacios, SABASN, RN  [facility]  April 29, 2022

## 2022-04-29 NOTE — DISCHARGE INSTRUCTIONS
Return to the emergency department if you develop new or worsening symptoms.  Drink frequent small sips of water to stay hydrated.  If you start having a fever or increasing pain please return so we can reevaluate you.  I will be working on the weekend if you need to come back I be happy to take a look to see if there is something else we need to do.  I hope this just resolves on its own.  This could be gas pains.  I am really not sure the cause of your symptoms but the good news is that your CAT scan was normal and your blood work looked good.  It was a pleasure to meet you.  Good luck moving forward.

## 2022-04-29 NOTE — ED NOTES
Pt refused pain medication at this time, states she drove here and doesn't have anyone to call at this time for a ride home.

## 2022-04-29 NOTE — ED PROVIDER NOTES
History     Chief Complaint   Patient presents with     Abdominal Pain     HPI  Sun De La Garza is a 61 year old female who presents to the emergency department secondary to abdominal pain.  It started 3 days ago and is located in the left lower quadrant.  It feels sharp and uncomfortable.  She states it is 8 out of 10 in intensity.  It feels like a bubble or bloating in her lower abdomen.  She is long since suffered from diverticulitis.  She is due to have a colonoscopy in May.  She has not had a CAT scan recently.  They are considering doing a partial colectomy apparently.  She has had nausea but no vomiting.  When the symptoms started she felt a rush of warmth and chills but never had a fever.  She has not had a cough, shortness of breath, melena, hematochezia.  This feels a little bit different than her usual diverticulitis because it feels more bloated and full in her low abdomen all the way across to the right abdomen.    Allergies:  No Known Allergies    Problem List:    Patient Active Problem List    Diagnosis Date Noted     Irritable bowel syndrome with both constipation and diarrhea 04/19/2018     Priority: Medium     Sleep apnea, unspecified type 09/13/2017     Priority: Medium     IBS (irritable bowel syndrome) 03/16/2016     Priority: Medium     Diverticulosis of large intestine 03/16/2016     Priority: Medium     Vitamin D deficiency 09/05/2014     Priority: Medium     Problem list name updated by automated process. Provider to review       Depression with anxiety 09/05/2014     Priority: Medium     CARDIOVASCULAR SCREENING; LDL GOAL LESS THAN 160 01/04/2013     Priority: Medium     Chronic abdominal pain 01/04/2013     Priority: Medium     Skin tag 01/04/2013     Priority: Medium     Sebaceous cyst 01/04/2013     Priority: Medium     head       Internal hemorrhoids 08/25/1997     Priority: Medium     Allergic rhinitis 08/25/1997     Priority: Medium     Depressive disorder 08/25/1997     Priority:  "Medium     Constipation 08/25/1997     Priority: Medium        Past Medical History:    History reviewed. No pertinent past medical history.    Past Surgical History:    Past Surgical History:   Procedure Laterality Date     COLONOSCOPY N/A 9/22/2014    Procedure: COLONOSCOPY;  Surgeon: Mj Morales MD;  Location:  GI     ESOPHAGOSCOPY, GASTROSCOPY, DUODENOSCOPY (EGD), COMBINED N/A 9/22/2014    Procedure: COMBINED ESOPHAGOSCOPY, GASTROSCOPY, DUODENOSCOPY (EGD), BIOPSY SINGLE OR MULTIPLE;  Surgeon: Mj Morales MD;  Location:  GI     GYN SURGERY      ablation       Family History:    Family History   Problem Relation Age of Onset     Other Cancer Father      Melanoma Father      Hypertension Father      Hyperlipidemia Mother      Hypertension Brother      Hypertension Brother      Anesthesia Reaction No family hx of        Social History:  Marital Status:  Single [1]  Social History     Tobacco Use     Smoking status: Former Smoker     Packs/day: 0.25     Years: 32.00     Pack years: 8.00     Start date: 5/9/1980     Quit date: 3/4/2012     Years since quitting: 10.1     Smokeless tobacco: Never Used   Vaping Use     Vaping Use: Never used   Substance Use Topics     Alcohol use: No     Comment: socailly     Drug use: No        Medications:    aspirin 81 MG tablet  buPROPion (WELLBUTRIN XL) 300 MG 24 hr tablet  Calcium Carbonate-Vitamin D (CALCIUM 500 + D PO)  dicyclomine (BENTYL) 10 MG capsule  FLUoxetine (PROZAC) 10 MG capsule  omeprazole (PRILOSEC) 40 MG DR capsule  Prenatal Vit-Fe Fumarate-FA (PRENATAL VITAMIN PO)          Review of Systems   All other systems reviewed and are negative.      Physical Exam   BP: 134/80  Pulse: 86  Temp: 97.7  F (36.5  C)  Resp: 18  Height: 160 cm (5' 3\")  Weight: 77.6 kg (171 lb)  SpO2: 96 %      Physical Exam  Vitals and nursing note reviewed.   Constitutional:       General: She is not in acute distress.     Appearance: She is well-developed. She is not " diaphoretic.   HENT:      Head: Normocephalic and atraumatic.   Eyes:      General: No scleral icterus.  Cardiovascular:      Rate and Rhythm: Normal rate and regular rhythm.      Heart sounds: Normal heart sounds.   Pulmonary:      Effort: Pulmonary effort is normal.      Breath sounds: Normal breath sounds. No stridor. No rhonchi.   Abdominal:      Palpations: Abdomen is soft. There is no fluid wave.      Tenderness: There is abdominal tenderness in the left lower quadrant. There is guarding.      Comments: Mild lower abdominal distention   Musculoskeletal:      Cervical back: Normal range of motion and neck supple.   Skin:     General: Skin is warm and dry.      Coloration: Skin is not pale.      Findings: No erythema or rash.   Neurological:      General: No focal deficit present.      Mental Status: She is alert and oriented to person, place, and time.   Psychiatric:         Mood and Affect: Mood normal.         Behavior: Behavior normal.         ED Course                 Procedures             Results for orders placed or performed during the hospital encounter of 04/29/22 (from the past 24 hour(s))   CBC with platelets differential    Narrative    The following orders were created for panel order CBC with platelets differential.  Procedure                               Abnormality         Status                     ---------                               -----------         ------                     CBC with platelets and d...[[id number]]                      Final result                 Please view results for these tests on the individual orders.   Comprehensive metabolic panel   Result Value Ref Range    Sodium 144 133 - 144 mmol/L    Potassium 3.6 3.4 - 5.3 mmol/L    Chloride 110 (H) 94 - 109 mmol/L    Carbon Dioxide (CO2) 30 20 - 32 mmol/L    Anion Gap 4 3 - 14 mmol/L    Urea Nitrogen 13 7 - 30 mg/dL    Creatinine 0.95 0.52 - 1.04 mg/dL    Calcium 9.0 8.5 - 10.1 mg/dL    Glucose 126 (H) 70 - 99 mg/dL     Alkaline Phosphatase 94 40 - 150 U/L    AST 17 0 - 45 U/L    ALT 26 0 - 50 U/L    Protein Total 7.3 6.8 - 8.8 g/dL    Albumin 3.6 3.4 - 5.0 g/dL    Bilirubin Total 0.4 0.2 - 1.3 mg/dL    GFR Estimate 69 >60 mL/min/1.73m2   Lipase   Result Value Ref Range    Lipase 127 73 - 393 U/L   CBC with platelets and differential   Result Value Ref Range    WBC Count 7.2 4.0 - 11.0 10e3/uL    RBC Count 4.59 3.80 - 5.20 10e6/uL    Hemoglobin 13.5 11.7 - 15.7 g/dL    Hematocrit 40.9 35.0 - 47.0 %    MCV 89 78 - 100 fL    MCH 29.4 26.5 - 33.0 pg    MCHC 33.0 31.5 - 36.5 g/dL    RDW 13.0 10.0 - 15.0 %    Platelet Count 271 150 - 450 10e3/uL    % Neutrophils 51 %    % Lymphocytes 35 %    % Monocytes 8 %    % Eosinophils 5 %    % Basophils 1 %    % Immature Granulocytes 0 %    NRBCs per 100 WBC 0 <1 /100    Absolute Neutrophils 3.7 1.6 - 8.3 10e3/uL    Absolute Lymphocytes 2.6 0.8 - 5.3 10e3/uL    Absolute Monocytes 0.6 0.0 - 1.3 10e3/uL    Absolute Eosinophils 0.3 0.0 - 0.7 10e3/uL    Absolute Basophils 0.1 0.0 - 0.2 10e3/uL    Absolute Immature Granulocytes 0.0 <=0.4 10e3/uL    Absolute NRBCs 0.0 10e3/uL   UA with Microscopic reflex to Culture    Specimen: Urine, Clean Catch   Result Value Ref Range    Color Urine Colorless Colorless, Straw, Light Yellow, Yellow    Appearance Urine Clear Clear    Glucose Urine Negative Negative mg/dL    Bilirubin Urine Negative Negative    Ketones Urine Negative Negative mg/dL    Specific Gravity Urine 1.001 (L) 1.003 - 1.035    Blood Urine Negative Negative    pH Urine 6.0 5.0 - 7.0    Protein Albumin Urine Negative Negative mg/dL    Urobilinogen Urine Normal Normal, 2.0 mg/dL    Nitrite Urine Negative Negative    Leukocyte Esterase Urine Negative Negative    RBC Urine 0 <=2 /HPF    WBC Urine 0 <=5 /HPF    Squamous Epithelials Urine <1 <=1 /HPF    Narrative    Urine Culture not indicated   CT Abdomen Pelvis w Contrast    Narrative    CT ABDOMEN PELVIS W CONTRAST 4/29/2022 11:28 AM    CLINICAL  HISTORY: Diverticulitis suspected. Abdominal pain.    TECHNIQUE: CT scan of the abdomen and pelvis was performed following  injection of IV contrast. Multiplanar reformats were obtained. Dose  reduction techniques were used.  CONTRAST: 85 mL Isovue 370     COMPARISON: 6/12/2021    FINDINGS:   LOWER CHEST: Normal.    HEPATOBILIARY: Normal.    PANCREAS: Normal.    SPLEEN: Normal.    ADRENAL GLANDS: Normal.    KIDNEYS/BLADDER: Normal.    BOWEL: No small bowel or colonic obstruction or temperature changes.  Normal appendix. Sigmoid diverticulosis.    PELVIC ORGANS: No pelvic or adnexal masses.    ADDITIONAL FINDINGS: No lymphadenopathy. No abdominal aortic aneurysm.  No free fluid, fluid collections or extraluminal air.    MUSCULOSKELETAL: Normal.      Impression    IMPRESSION:   1.  No acute findings in the abdomen and pelvis.  2.  Sigmoid diverticulosis without CT evidence for acute  diverticulitis.    ASHLEY GAY MD         SYSTEM ID:  [id number]       Medications   0.9% sodium chloride BOLUS (0 mLs Intravenous Stopped 4/29/22 1142)     Followed by   sodium chloride 0.9% infusion (has no administration in time range)   ondansetron (ZOFRAN) injection 4 mg (4 mg Intravenous Given 4/29/22 1140)   HYDROmorphone (PF) (DILAUDID) injection 0.5 mg (has no administration in time range)   Saline Bag 100mL  CT  flush use only (60 mLs Intravenous Given 4/29/22 1118)   iopamidol (ISOVUE-370) solution 500 mL (85 mLs Intravenous Given 4/29/22 1119)       Assessments & Plan (with Medical Decision Making)  Left lower quadrant abdominal pain in a patient with a history of diverticulitis.  This time around she says it feels more bloated and has carried over to the right side as well.  She is concerned about her symptoms despite having diverticulitis in the past.  Vital signs are reviewed and are reassuring.  Blood pressure is normal.  She is afebrile pulse of 86.  Given her history and concerns today I am concerned for diverticulitis  with perforation or abscess formation.  Because of these concerns we decided to proceed with CT scan of the abdomen and pelvis.  She was given IV fluids, Dilaudid, Zofran.  Labs, urinalysis, CT scan are all reassuring.  Patient's vital signs are reassuring.  White blood cell count is normal.  No evidence for diverticulitis.  She does have a history of irritable bowel and that could be the cause of her abdominal pain today.  The patient was reevaluated prior to discharge and pain was minimal at that time.  She did feel nauseated.  She did not get any pain medicines while here because she wanted to drive home.  I went over the results with her and she understood.  I specifically gave her return to ER precautions.  At this point she has lower abdominal pain of uncertain etiology.  I am reassured by the work-up but informed her that if things change or something worsens she should return.  All questions answered and the patient was discharged home in stable condition.     I have reviewed the nursing notes.    I have reviewed the findings, diagnosis, plan and need for follow up with the patient.      New Prescriptions    No medications on file       Final diagnoses:   Abdominal pain, left lower quadrant       4/29/2022   [facility] EMERGENCY DEPT     Matthew Dillard MD  04/29/22 [id number]

## 2022-05-02 ENCOUNTER — PATIENT OUTREACH (OUTPATIENT)
Dept: FAMILY MEDICINE | Facility: OTHER | Age: 62
End: 2022-05-02
Payer: COMMERCIAL

## 2022-05-02 NOTE — TELEPHONE ENCOUNTER
Reason for follow up call: Sun De La Garza appeared on our list for being seen in and recenlty discharge from the Emergency Room/In Patient Hospital Admission.    Chief Complaint   Patient presents with     Hospital F/U     ED: Abdominal pain       TCM Episode No    Encounter routed for Clinic Triage RN to call for follow up.    Jena Manzanares, SABASN, RN, PHN  Registered Nurse-Clinic Triage  [facility]  5/2/2022 at 2:41 PM

## 2022-05-03 NOTE — TELEPHONE ENCOUNTER
ED / Discharge Outreach Protocol    Patient Contact    Attempt # 1    Was call answered?  No.  Left message on voicemail with information to call a clinic RN back. [phone]. Jil Chavis RN

## 2022-05-04 NOTE — TELEPHONE ENCOUNTER
ED / Discharge Outreach Protocol    Patient Contact    Attempt # 2    Was call answered?  No.  Left message on voicemail with information to call back and speak with triage nurse.     Closing encounter as 2 attempts have been made.     Mara OBREGONN, RN

## 2022-05-06 ENCOUNTER — OFFICE VISIT (OUTPATIENT)
Dept: FAMILY MEDICINE | Facility: OTHER | Age: 62
End: 2022-05-06
Payer: COMMERCIAL

## 2022-05-06 ENCOUNTER — TELEPHONE (OUTPATIENT)
Dept: FAMILY MEDICINE | Facility: OTHER | Age: 62
End: 2022-05-06
Payer: COMMERCIAL

## 2022-05-06 VITALS
WEIGHT: 168 LBS | OXYGEN SATURATION: 93 % | HEART RATE: 95 BPM | DIASTOLIC BLOOD PRESSURE: 72 MMHG | BODY MASS INDEX: 29.76 KG/M2 | TEMPERATURE: 98.5 F | RESPIRATION RATE: 16 BRPM | SYSTOLIC BLOOD PRESSURE: 110 MMHG

## 2022-05-06 DIAGNOSIS — K57.32 DIVERTICULITIS OF COLON: Primary | ICD-10-CM

## 2022-05-06 DIAGNOSIS — Z20.822 PERSON UNDER INVESTIGATION FOR COVID-19: ICD-10-CM

## 2022-05-06 DIAGNOSIS — R11.2 NON-INTRACTABLE VOMITING WITH NAUSEA, UNSPECIFIED VOMITING TYPE: ICD-10-CM

## 2022-05-06 DIAGNOSIS — J02.9 SORE THROAT: ICD-10-CM

## 2022-05-06 LAB — DEPRECATED S PYO AG THROAT QL EIA: NEGATIVE

## 2022-05-06 PROCEDURE — 99214 OFFICE O/P EST MOD 30 MIN: CPT | Performed by: PHYSICIAN ASSISTANT

## 2022-05-06 PROCEDURE — U0003 INFECTIOUS AGENT DETECTION BY NUCLEIC ACID (DNA OR RNA); SEVERE ACUTE RESPIRATORY SYNDROME CORONAVIRUS 2 (SARS-COV-2) (CORONAVIRUS DISEASE [COVID-19]), AMPLIFIED PROBE TECHNIQUE, MAKING USE OF HIGH THROUGHPUT TECHNOLOGIES AS DESCRIBED BY CMS-2020-01-R: HCPCS | Performed by: PHYSICIAN ASSISTANT

## 2022-05-06 PROCEDURE — 87651 STREP A DNA AMP PROBE: CPT | Performed by: PHYSICIAN ASSISTANT

## 2022-05-06 PROCEDURE — U0005 INFEC AGEN DETEC AMPLI PROBE: HCPCS | Performed by: PHYSICIAN ASSISTANT

## 2022-05-06 RX ORDER — ONDANSETRON 4 MG/1
4 TABLET, ORALLY DISINTEGRATING ORAL EVERY 8 HOURS PRN
Qty: 20 TABLET | Refills: 0 | Status: SHIPPED | OUTPATIENT
Start: 2022-05-06 | End: 2023-09-21

## 2022-05-06 ASSESSMENT — PAIN SCALES - GENERAL: PAINLEVEL: SEVERE PAIN (7)

## 2022-05-06 NOTE — PROGRESS NOTES
Assessment & Plan       ICD-10-CM    1. Diverticulitis of colon  K57.32 amoxicillin-clavulanate (AUGMENTIN) 875-125 MG tablet   2. Non-intractable vomiting with nausea, unspecified vomiting type  R11.2 ondansetron (ZOFRAN ODT) 4 MG ODT tab   3. Sore throat  J02.9 Streptococcus A Rapid Screen w/Reflex to PCR - Clinic Collect     Group A Streptococcus PCR Throat Swab   4. Person under investigation for COVID-19  Z20.822 Symptomatic; Yes; 5/4/2022 COVID-19 Virus (Coronavirus) by PCR Nose       1-2. History of IBS along with recurrent diverticulitis, now with worsening left lower quadrant pain, nausea, vomiting and diarrhea. Although the CT did not reveal acute diverticulitis last week, I feel it is appropriate to treat her with Augmentin given her progression of symptoms, especially since she has a colonoscopy scheduled in 10 days. Will prescribe Zofran to use as needed for nausea. She will continue with a clear liquid and bland diet and recommend she try Pedialyte or Gatorade to help replenish her electrolytes. If symptoms are not improving, contact the clinic.    3-4. Recent sore throat and cough the past few days. Exam is normal although there is a wet sounding cough appreciated intermittently throughout the visit. Strep swab completed and initial swab is negative. I also completed a COVID swab. I recommend symptomatic treatment with plenty of fluids, Tylenol, ibuprofen, and rest. She should stay quarantined until results are back and if symptoms worsen, she will contact the clinic.      Damaso Anderson PA-C  [facility] is a 61 year [facility] who presents for the following health issues     History of Present Illness       Reason for visit:  Diverticulitis sore throat  Symptom onset:  1-3 days ago  Symptoms include:  Diverticulitis diarrhea @and sore throat hard to swallow  Symptom intensity:  Severe  Symptom progression:  Improving  Had these symptoms before:  Yes  Has  tried/received treatment for these symptoms:  Yes  Previous treatment was successful:  Yes  Prior treatment description:  Cream pain meds puking med  What makes it worse:  Constant diarehea  What makes it better:  Soaking in a  bathtub and the cream    She eats 2-3 servings of fruits and vegetables daily.She consumes 0 sweetened beverage(s) daily.She exercises with enough effort to increase her heart rate 30 to 60 minutes per day.  She exercises with enough effort to increase her heart rate 4 days per week. She is missing 1 dose(s) of medications per week.  She is not taking prescribed medications regularly due to other.     She has a history of IBS along with recurrent diverticulitis, with at least 2 CT confirmed diverticulitis episodes in the past few years. She was seen in the ED on 4/29 with bilateral lower abdominal pain and diarrhea. She underwent a CT which did not reveal any evidence of acute diverticulitis. She was encouraged to continue with conservative treatment but since last week, the left lower abdominal pain has worsened and she is experiencing frequent nausea and vomiting episodes along with diarrhea. She denies any bloody stools. She has responded well to Augmentin in the past so is really hoping she can be treated with this again. She has also noticed a sore throat for the past few days along with a cough, but she is unable to cough up any mucous. No fevers or chills. No known exposure to COVID-19. She has a colonoscopy scheduled for 5/16 in hopes that she can undergo surgery for her recurrent diverticulitis.     ED Note 4/29/22 [facility] ER    Assessments & Plan (with Medical Decision Making)  Left lower quadrant abdominal pain in a patient with a history of diverticulitis.  This time around she says it feels more bloated and has carried over to the right side as well.  She is concerned about her symptoms despite having diverticulitis in the past.  Vital signs are reviewed and are  reassuring.  Blood pressure is normal.  She is afebrile pulse of 86.  Given her history and concerns today I am concerned for diverticulitis with perforation or abscess formation.  Because of these concerns we decided to proceed with CT scan of the abdomen and pelvis.  She was given IV fluids, Dilaudid, Zofran.  Labs, urinalysis, CT scan are all reassuring.  Patient's vital signs are reassuring.  White blood cell count is normal.  No evidence for diverticulitis.  She does have a history of irritable bowel and that could be the cause of her abdominal pain today.  The patient was reevaluated prior to discharge and pain was minimal at that time.  She did feel nauseated.  She did not get any pain medicines while here because she wanted to drive home.  I went over the results with her and she understood.  I specifically gave her return to ER precautions.  At this point she has lower abdominal pain of uncertain etiology.  I am reassured by the work-up but informed her that if things change or something worsens she should return.  All questions answered and the patient was discharged home in stable condition.    Review of Systems   Constitutional, HEENT, cardiovascular, pulmonary, gi and gu systems are negative, except as otherwise noted.      Objective    /72   Pulse 95   Temp 98.5  F (36.9  C) (Temporal)   Resp 16   Wt 76.2 kg (168 lb)   SpO2 93%   BMI 29.76 kg/m    Body mass index is 29.76 kg/m .  Physical Exam   GENERAL: healthy, alert and no distress  EYES: Eyes grossly normal to inspection, PERRL and conjunctivae and sclerae normal  HENT: ear canals and TM's normal, nasal mucosa is mildly erythematous, pharynx is clear  NECK: no adenopathy, no asymmetry, masses, or scars and thyroid normal to palpation  RESP: lungs clear to auscultation - no rales, rhonchi or wheezes  CV: regular rate and rhythm, normal S1 S2, no S3 or S4, no murmur, click or rub  ABDOMEN: soft, moderate left lower quadrant tenderness  without rebound or guarding, no hepatosplenomegaly, no masses and bowel sounds normal  NEURO: Normal strength and tone, mentation intact and speech normal. Gait is stable.  PSYCH: mentation appears normal, affect normal/bright    Results for orders placed or performed in visit on 05/06/22   Streptococcus A Rapid Screen w/Reflex to PCR - Clinic Collect     Status: Normal    Specimen: Throat; Swab   Result Value Ref Range    Group A Strep antigen Negative Negative

## 2022-05-06 NOTE — TELEPHONE ENCOUNTER
Reason for Call:  Other appointment    Detailed comments: SORE THROAT POSS STREP -- PATIENT WANTS A IN PERSON VISIT     Phone Number Patient can be reached at: Cell number on file:    Telephone Information:   Mobile [phone]       Best Time: ANY    Can we leave a detailed message on this number? YES    Call taken on 5/6/2022 at 7:20 AM by Cathy Adan

## 2022-05-06 NOTE — PATIENT INSTRUCTIONS
Will order strep and COVID testing.  Stay quarantined until results are back.    Will treat the diverticulitis with Augmentin twice daily for 10 days.  Try a probiotic again while on this.    Stay well hydrated and continue with a bland diet.    Try Mucinex to help with the congestion.  If not improving, let me know.

## 2022-05-06 NOTE — TELEPHONE ENCOUNTER
Patient scheduled today at 1:30.   Patient has been notified  Marie Lauren MA on 5/6/2022 at 8:42 AM

## 2022-05-07 LAB
GROUP A STREP BY PCR: NOT DETECTED
SARS-COV-2 RNA RESP QL NAA+PROBE: POSITIVE

## 2022-05-09 ENCOUNTER — TELEPHONE (OUTPATIENT)
Dept: GASTROENTEROLOGY | Facility: CLINIC | Age: 62
End: 2022-05-09
Payer: COMMERCIAL

## 2022-05-09 NOTE — TELEPHONE ENCOUNTER
Caller: Sun De La Garza      Procedure: Colonoscopy     Date, Location, and Surgeon of Procedure Cancelled: 05/16/2022 - [facility]     Ordering Provider: mabel     Reason for cancel (please be detailed, any staff messages or encounters to note?):     05/06/2022 -- Positive COVID test -- needed to r/s.         Rescheduled: yes      If rescheduled:    Date: 07/18/2022   Location:     Prep Resent: yes (changes to prep?)   Covid Test Rescheduled: no - within 90 day window    Note any change or update to original order/sedation: n/a

## 2022-07-06 ENCOUNTER — OFFICE VISIT (OUTPATIENT)
Dept: FAMILY MEDICINE | Facility: OTHER | Age: 62
End: 2022-07-06
Payer: COMMERCIAL

## 2022-07-06 VITALS
BODY MASS INDEX: 28.99 KG/M2 | HEIGHT: 65 IN | WEIGHT: 174 LBS | SYSTOLIC BLOOD PRESSURE: 128 MMHG | TEMPERATURE: 97.6 F | DIASTOLIC BLOOD PRESSURE: 72 MMHG | HEART RATE: 85 BPM | OXYGEN SATURATION: 92 %

## 2022-07-06 DIAGNOSIS — G47.30 SLEEP APNEA, UNSPECIFIED TYPE: ICD-10-CM

## 2022-07-06 DIAGNOSIS — Z01.818 PREOP GENERAL PHYSICAL EXAM: Primary | ICD-10-CM

## 2022-07-06 DIAGNOSIS — K57.32 DIVERTICULITIS OF COLON: ICD-10-CM

## 2022-07-06 PROCEDURE — 99214 OFFICE O/P EST MOD 30 MIN: CPT | Performed by: PHYSICIAN ASSISTANT

## 2022-07-06 ASSESSMENT — PATIENT HEALTH QUESTIONNAIRE - PHQ9: SUM OF ALL RESPONSES TO PHQ QUESTIONS 1-9: 4

## 2022-07-06 ASSESSMENT — PAIN SCALES - GENERAL: PAINLEVEL: NO PAIN (0)

## 2022-07-06 NOTE — PROGRESS NOTES
[address]  [facility] [address]  Phone: [phone]  Primary Provider: Denise Schulte  Pre-op Performing Provider: BAIRON BALLARD    PREOPERATIVE EVALUATION:  Today's date: 7/6/2022    Sun De La Garza is a 61 year old female who presents for a preoperative evaluation.    Surgical Information:  Surgery/Procedure: Colonoscopy  Surgery Location: [place]  Surgeon: Dr. Gunter  Surgery Date: 7/18/22  Time of Surgery: 7:30AM  Where patient plans to recover: At home with family  Fax number for surgical facility: Note does not need to be faxed, will be available electronically in Epic.    Type of Anesthesia Anticipated: MAC    Assessment & Plan     The proposed surgical procedure is considered LOW risk.      ICD-10-CM    1. Preop general physical exam  Z01.818 Asymptomatic COVID-19 Virus (Coronavirus) by PCR Nose   2. Diverticulitis of colon  K57.32 Asymptomatic COVID-19 Virus (Coronavirus) by PCR Nose   3. Sleep apnea, unspecified type  G47.30        Cleared for surgery.    HOLD aspirin 1 week prior to surgery    RECOMMENDATION:  APPROVAL GIVEN to proceed with proposed procedure, without further diagnostic evaluation.      Subjective     HPI related to upcoming procedure: She has episodes of recurrent diverticulitis so will be undergoing a colonoscopy for further evaluation prior to definitive surgery (sigmoidectomy) for her symptoms.     Preop Questions 7/4/2022   1. Have you ever had a heart attack or stroke? No   2. Have you ever had surgery on your heart or blood vessels, such as a stent placement, a coronary artery bypass, or surgery on an artery in your head, neck, heart, or legs? No   3. Do you have chest pain with activity? No   4. Do you have a history of  heart failure? No   5. Do you currently have a cold, bronchitis or symptoms of other infection? No   6. Do you have a cough, shortness of breath, or wheezing? No   7. Do you or anyone in your  family have previous history of blood clots? No   8. Do you or does anyone in your family have a serious bleeding problem such as prolonged bleeding following surgeries or cuts? No   9. Have you ever had problems with anemia or been told to take iron pills? YES - during pregnancy years ago   10. Have you had any abnormal blood loss such as black, tarry or bloody stools, or abnormal vaginal bleeding? YES - during pregnancy years ago   11. Have you ever had a blood transfusion? No   12. Are you willing to have a blood transfusion if it is medically needed before, during, or after your surgery? Yes   13. Have you or any of your relatives ever had problems with anesthesia? YES - took a little longer to come out of anaesthesia during a previous surgery   14. Do you have sleep apnea, excessive snoring or daytime drowsiness? YES - uses a CPAP   14a. Do you have a CPAP machine? Yes   15. Do you have any artifical heart valves or other implanted medical devices like a pacemaker, defibrillator, or continuous glucose monitor? No   16. Do you have artificial joints? No   17. Are you allergic to latex? No   18. Is there any chance that you may be pregnant? No       Health Care Directive:  Patient does not have a Health Care Directive or Living Will: Discussed advance care planning with patient; however, patient declined at this time.    Status of Chronic Conditions:  See problem list for active medical problems.  Problems all longstanding and stable, except as noted/documented.  See ROS for pertinent symptoms related to these conditions.      Review of Systems  CONSTITUTIONAL: NEGATIVE for fever, chills, change in weight  INTEGUMENTARY/SKIN: NEGATIVE for worrisome rashes, moles or lesions  EYES: NEGATIVE for vision changes or irritation  ENT/MOUTH: NEGATIVE for ear, mouth and throat problems  RESP: NEGATIVE for significant cough or SOB  CV: NEGATIVE for chest pain, palpitations or peripheral edema  GI: NEGATIVE for nausea,  abdominal pain, heartburn, or change in bowel habits  : NEGATIVE for frequency, dysuria, or hematuria  MUSCULOSKELETAL: NEGATIVE for significant arthralgias or myalgia  NEURO: NEGATIVE for weakness, dizziness or paresthesias  ENDOCRINE: NEGATIVE for temperature intolerance, skin/hair changes  HEME: NEGATIVE for bleeding problems  PSYCHIATRIC: NEGATIVE for changes in mood or affect    Patient Active Problem List    Diagnosis Date Noted     Irritable bowel syndrome with both constipation and diarrhea 04/19/2018     Priority: Medium     Sleep apnea, unspecified type 09/13/2017     Priority: Medium     IBS (irritable bowel syndrome) 03/16/2016     Priority: Medium     Diverticulosis of large intestine 03/16/2016     Priority: Medium     Vitamin D deficiency 09/05/2014     Priority: Medium     Problem list name updated by automated process. Provider to review       Depression with anxiety 09/05/2014     Priority: Medium     CARDIOVASCULAR SCREENING; LDL GOAL LESS THAN 160 01/04/2013     Priority: Medium     Chronic abdominal pain 01/04/2013     Priority: Medium     Skin tag 01/04/2013     Priority: Medium     Sebaceous cyst 01/04/2013     Priority: Medium     head       Internal hemorrhoids 08/25/1997     Priority: Medium     Allergic rhinitis 08/25/1997     Priority: Medium     Depressive disorder 08/25/1997     Priority: Medium     Constipation 08/25/1997     Priority: Medium      History reviewed. No pertinent past medical history.  Past Surgical History:   Procedure Laterality Date     COLONOSCOPY N/A 9/22/2014    Procedure: COLONOSCOPY;  Surgeon: Mj Morales MD;  Location: [facility]     ESOPHAGOSCOPY, GASTROSCOPY, DUODENOSCOPY (EGD), COMBINED N/A 9/22/2014    Procedure: COMBINED ESOPHAGOSCOPY, GASTROSCOPY, DUODENOSCOPY (EGD), BIOPSY SINGLE OR MULTIPLE;  Surgeon: Mj Morales MD;  Location:  GI     GYN SURGERY      ablation     Current Outpatient Medications   Medication Sig Dispense Refill     aspirin  "81 MG tablet Take 1 tablet (81 mg) by mouth daily 30 tablet      buPROPion (WELLBUTRIN XL) 300 MG 24 hr tablet TAKE 1 TABLET BY MOUTH IN  THE MORNING 90 tablet 3     Calcium Carbonate-Vitamin D (CALCIUM 500 + D PO)        FLUoxetine (PROZAC) 10 MG capsule Take 1 capsule (10 mg) by mouth daily 90 capsule 1     omeprazole (PRILOSEC) 40 MG DR capsule TAKE 1 CAPSULE BY MOUTH  DAILY 30-60 MINUTES BEFORE  A MEAL. (Patient taking differently: TAKE 1 CAPSULE BY MOUTH  DAILY 30-60 MINUTES BEFORE  A MEAL.) 90 capsule 3     dicyclomine (BENTYL) 10 MG capsule TAKE 1 CAPSULE FOUR TIMES A DAY BEFORE MEALS AND NIGHTLY (Patient not taking: Reported on 5/6/2022) 60 capsule 5     ondansetron (ZOFRAN ODT) 4 MG ODT tab Take 1 tablet (4 mg) by mouth every 8 hours as needed for nausea 20 tablet 0       No Known Allergies     Social History     Tobacco Use     Smoking status: Former Smoker     Packs/day: 0.25     Years: 32.00     Pack years: 8.00     Start date: 5/9/1980     Quit date: 3/4/2012     Years since quitting: 10.3     Smokeless tobacco: Never Used   Substance Use Topics     Alcohol use: No     Comment: socailly       History   Drug Use No         Objective     /72   Pulse 85   Temp 97.6  F (36.4  C) (Temporal)   Ht 1.655 m (5' 5.16\")   Wt 78.9 kg (174 lb)   SpO2 92%   BMI 28.82 kg/m      Physical Exam    GENERAL APPEARANCE: healthy, alert and no distress     EYES: EOMI,  PERRL     HENT: ear canals and TM's normal and nose and mouth without ulcers or lesions     NECK: no adenopathy, no asymmetry, masses, or scars and thyroid normal to palpation     RESP: lungs clear to auscultation - no rales, rhonchi or wheezes     CV: regular rate and rhythm, normal S1 S2, no S3 or S4 and no murmur, click or rub     ABDOMEN:  soft, nontender, no HSM or masses and bowel sounds normal     MS: extremities normal- no gross deformities noted, no evidence of inflammation in joints, FROM in all extremities.     SKIN: no suspicious lesions " or rashes     NEURO: Normal strength and tone, sensory exam grossly normal, mentation intact and speech normal. Gait is stable.     PSYCH: mentation appears normal. and affect normal/bright     LYMPHATICS: No cervical adenopathy    Recent Labs   Lab Test 04/29/22  1057 01/28/22  1150 01/28/22  1149   HGB 13.5 13.8  --     277  --      --  141   POTASSIUM 3.6  --  3.7   CR 0.95  --  0.93        Diagnostics:  No labs were ordered during this visit.   No EKG required for low risk surgery (cataract, skin procedure, breast biopsy, etc).    Revised Cardiac Risk Index (RCRI):  The patient has the following serious cardiovascular risks for perioperative complications:   - No serious cardiac risks = 0 points     RCRI Interpretation: 0 points: Class I (very low risk - 0.4% complication rate)           Signed Electronically by: Damaso Anderson PA-C  Copy of this evaluation report is provided to requesting physician.

## 2022-07-06 NOTE — PATIENT INSTRUCTIONS
Hold aspirin 1 week prior to surgery.  They will call for the COVID test.    Preparing for Your Surgery  Getting started  A nurse will call you to review your health history and instructions. They will give you an arrival time based on your scheduled surgery time. Please be ready to share:  Your doctor's clinic name and phone number  Your medical, surgical and anesthesia history  A list of allergies and sensitivities  A list of medicines, including herbal treatments and over-the-counter drugs  Whether the patient has a legal guardian (ask how to send us the papers in advance)  Please tell us if you're pregnant--or if there's any chance you might be pregnant. Some surgeries may injure a fetus (unborn baby), so they require a pregnancy test. Surgeries that are safe for a fetus don't always need a test, and you can choose whether to have one.   If you have a child who's having surgery, please ask for a copy of Preparing for Your Child's Surgery.    Preparing for surgery  Within 30 days of surgery: Have a pre-op exam (sometimes called an H&P, or History and Physical). This can be done at a clinic or pre-operative center.  If you're having a , you may not need this exam. Talk to your care team.  At your pre-op exam, talk to your care team about all medicines you take. If you need to stop any medicines before surgery, ask when to start taking them again.  We do this for your safety. Many medicines can make you bleed too much during surgery. Some change how well surgery (anesthesia) drugs work.  Call your insurance company to let them know you're having surgery. (If you don't have insurance, call [phone].)  Call your clinic if there's any change in your health. This includes signs of a cold or flu (sore throat, runny nose, cough, rash, fever). It also includes a scrape or scratch near the surgery site.  If you have questions on the day of surgery, call your hospital or surgery center.  COVID testing  You may  need to be tested for COVID-19 before having surgery. If so, we will give you instructions.  Eating and drinking guidelines  For your safety: Unless your surgeon tells you otherwise, follow the guidelines below.  Eat and drink as usual until 8 hours before surgery. After that, no food or milk.  Drink clear liquids until 2 hours before surgery. These are liquids you can see through, like water, Gatorade and Propel Water. You may also have black coffee and tea (no cream or milk).  Nothing by mouth within 2 hours of surgery. This includes gum, candy and breath mints.  If you drink alcohol: Stop drinking it the night before surgery.  If your care team tells you to take medicine on the morning of surgery, it's okay to take it with a sip of water.  Preventing infection  Shower or bathe the night before and morning of your surgery. Follow the instructions your clinic gave you. (If no instructions, use regular soap.)  Don't shave or clip hair near your surgery site. We'll remove the hair if needed.  Don't smoke or vape the morning of surgery. You may chew nicotine gum up to 2 hours before surgery. A nicotine patch is okay.  Note: Some surgeries require you to completely quit smoking and nicotine. Check with your surgeon.  Your care team will make every effort to keep you safe from infection. We will:  Clean our hands often with soap and water (or an alcohol-based hand rub).  Clean the skin at your surgery site with a special soap that kills germs.  Give you a special gown to keep you warm. (Cold raises the risk of infection.)  Wear special hair covers, masks, gowns and gloves during surgery.  Give antibiotic medicine, if prescribed. Not all surgeries need antibiotics.  What to bring on the day of surgery  Photo ID and insurance card  Copy of your health care directive, if you have one  Glasses and hearing aides (bring cases)  You can't wear contacts during surgery  Inhaler and eye drops, if you use them (tell us about these  when you arrive)  CPAP machine or breathing device, if you use them  A few personal items, if spending the night  If you have . . .  A pacemaker, ICD (cardiac defibrillator) or other implant: Bring the ID card.  An implanted stimulator: Bring the remote control.  A legal guardian: Bring a copy of the certified (court-stamped) guardianship papers.  Please remove any jewelry, including body piercings. Leave jewelry and other valuables at home.  If you're going home the day of surgery  You must have a responsible adult drive you home. They should stay with you overnight as well.  If you don't have someone to stay with you, and you aren't safe to go home alone, we may keep you overnight. Insurance often won't pay for this.  After surgery  If it's hard to control your pain or you need more pain medicine, please call your surgeon's office.  Questions?   If you have any questions for your care team, list them here: _________________________________________________________________________________________________________________________________________________________________________ ____________________________________ ____________________________________ ____________________________________  For informational purposes only. Not to replace the advice of your health care provider. Copyright   2003, 2019 [facility]. All rights reserved. Clinically reviewed by Alivia Morris MD. [employer] [id number] - REV 07/21.

## 2022-07-06 NOTE — H&P (VIEW-ONLY)
[address]  [facility] [address]  Phone: [phone]  Primary Provider: Denise Schulte  Pre-op Performing Provider: BAIRON BALLARD    PREOPERATIVE EVALUATION:  Today's date: 7/6/2022    Sun De La Garza is a 61 year old female who presents for a preoperative evaluation.    Surgical Information:  Surgery/Procedure: Colonoscopy  Surgery Location: [place]  Surgeon: Dr. Gunter  Surgery Date: 7/18/22  Time of Surgery: 7:30AM  Where patient plans to recover: At home with family  Fax number for surgical facility: Note does not need to be faxed, will be available electronically in Epic.    Type of Anesthesia Anticipated: MAC    Assessment & Plan     The proposed surgical procedure is considered LOW risk.      ICD-10-CM    1. Preop general physical exam  Z01.818 Asymptomatic COVID-19 Virus (Coronavirus) by PCR Nose   2. Diverticulitis of colon  K57.32 Asymptomatic COVID-19 Virus (Coronavirus) by PCR Nose   3. Sleep apnea, unspecified type  G47.30        Cleared for surgery.    HOLD aspirin 1 week prior to surgery    RECOMMENDATION:  APPROVAL GIVEN to proceed with proposed procedure, without further diagnostic evaluation.      Subjective     HPI related to upcoming procedure: She has episodes of recurrent diverticulitis so will be undergoing a colonoscopy for further evaluation prior to definitive surgery (sigmoidectomy) for her symptoms.     Preop Questions 7/4/2022   1. Have you ever had a heart attack or stroke? No   2. Have you ever had surgery on your heart or blood vessels, such as a stent placement, a coronary artery bypass, or surgery on an artery in your head, neck, heart, or legs? No   3. Do you have chest pain with activity? No   4. Do you have a history of  heart failure? No   5. Do you currently have a cold, bronchitis or symptoms of other infection? No   6. Do you have a cough, shortness of breath, or wheezing? No   7. Do you or anyone in your  family have previous history of blood clots? No   8. Do you or does anyone in your family have a serious bleeding problem such as prolonged bleeding following surgeries or cuts? No   9. Have you ever had problems with anemia or been told to take iron pills? YES - during pregnancy years ago   10. Have you had any abnormal blood loss such as black, tarry or bloody stools, or abnormal vaginal bleeding? YES - during pregnancy years ago   11. Have you ever had a blood transfusion? No   12. Are you willing to have a blood transfusion if it is medically needed before, during, or after your surgery? Yes   13. Have you or any of your relatives ever had problems with anesthesia? YES - took a little longer to come out of anaesthesia during a previous surgery   14. Do you have sleep apnea, excessive snoring or daytime drowsiness? YES - uses a CPAP   14a. Do you have a CPAP machine? Yes   15. Do you have any artifical heart valves or other implanted medical devices like a pacemaker, defibrillator, or continuous glucose monitor? No   16. Do you have artificial joints? No   17. Are you allergic to latex? No   18. Is there any chance that you may be pregnant? No       Health Care Directive:  Patient does not have a Health Care Directive or Living Will: Discussed advance care planning with patient; however, patient declined at this time.    Status of Chronic Conditions:  See problem list for active medical problems.  Problems all longstanding and stable, except as noted/documented.  See ROS for pertinent symptoms related to these conditions.      Review of Systems  CONSTITUTIONAL: NEGATIVE for fever, chills, change in weight  INTEGUMENTARY/SKIN: NEGATIVE for worrisome rashes, moles or lesions  EYES: NEGATIVE for vision changes or irritation  ENT/MOUTH: NEGATIVE for ear, mouth and throat problems  RESP: NEGATIVE for significant cough or SOB  CV: NEGATIVE for chest pain, palpitations or peripheral edema  GI: NEGATIVE for nausea,  abdominal pain, heartburn, or change in bowel habits  : NEGATIVE for frequency, dysuria, or hematuria  MUSCULOSKELETAL: NEGATIVE for significant arthralgias or myalgia  NEURO: NEGATIVE for weakness, dizziness or paresthesias  ENDOCRINE: NEGATIVE for temperature intolerance, skin/hair changes  HEME: NEGATIVE for bleeding problems  PSYCHIATRIC: NEGATIVE for changes in mood or affect    Patient Active Problem List    Diagnosis Date Noted     Irritable bowel syndrome with both constipation and diarrhea 04/19/2018     Priority: Medium     Sleep apnea, unspecified type 09/13/2017     Priority: Medium     IBS (irritable bowel syndrome) 03/16/2016     Priority: Medium     Diverticulosis of large intestine 03/16/2016     Priority: Medium     Vitamin D deficiency 09/05/2014     Priority: Medium     Problem list name updated by automated process. Provider to review       Depression with anxiety 09/05/2014     Priority: Medium     CARDIOVASCULAR SCREENING; LDL GOAL LESS THAN 160 01/04/2013     Priority: Medium     Chronic abdominal pain 01/04/2013     Priority: Medium     Skin tag 01/04/2013     Priority: Medium     Sebaceous cyst 01/04/2013     Priority: Medium     head       Internal hemorrhoids 08/25/1997     Priority: Medium     Allergic rhinitis 08/25/1997     Priority: Medium     Depressive disorder 08/25/1997     Priority: Medium     Constipation 08/25/1997     Priority: Medium      History reviewed. No pertinent past medical history.  Past Surgical History:   Procedure Laterality Date     COLONOSCOPY N/A 9/22/2014    Procedure: COLONOSCOPY;  Surgeon: Mj Morales MD;  Location: [facility]     ESOPHAGOSCOPY, GASTROSCOPY, DUODENOSCOPY (EGD), COMBINED N/A 9/22/2014    Procedure: COMBINED ESOPHAGOSCOPY, GASTROSCOPY, DUODENOSCOPY (EGD), BIOPSY SINGLE OR MULTIPLE;  Surgeon: Mj Morales MD;  Location:  GI     GYN SURGERY      ablation     Current Outpatient Medications   Medication Sig Dispense Refill     aspirin  "81 MG tablet Take 1 tablet (81 mg) by mouth daily 30 tablet      buPROPion (WELLBUTRIN XL) 300 MG 24 hr tablet TAKE 1 TABLET BY MOUTH IN  THE MORNING 90 tablet 3     Calcium Carbonate-Vitamin D (CALCIUM 500 + D PO)        FLUoxetine (PROZAC) 10 MG capsule Take 1 capsule (10 mg) by mouth daily 90 capsule 1     omeprazole (PRILOSEC) 40 MG DR capsule TAKE 1 CAPSULE BY MOUTH  DAILY 30-60 MINUTES BEFORE  A MEAL. (Patient taking differently: TAKE 1 CAPSULE BY MOUTH  DAILY 30-60 MINUTES BEFORE  A MEAL.) 90 capsule 3     dicyclomine (BENTYL) 10 MG capsule TAKE 1 CAPSULE FOUR TIMES A DAY BEFORE MEALS AND NIGHTLY (Patient not taking: Reported on 5/6/2022) 60 capsule 5     ondansetron (ZOFRAN ODT) 4 MG ODT tab Take 1 tablet (4 mg) by mouth every 8 hours as needed for nausea 20 tablet 0       No Known Allergies     Social History     Tobacco Use     Smoking status: Former Smoker     Packs/day: 0.25     Years: 32.00     Pack years: 8.00     Start date: 5/9/1980     Quit date: 3/4/2012     Years since quitting: 10.3     Smokeless tobacco: Never Used   Substance Use Topics     Alcohol use: No     Comment: socailly       History   Drug Use No         Objective     /72   Pulse 85   Temp 97.6  F (36.4  C) (Temporal)   Ht 1.655 m (5' 5.16\")   Wt 78.9 kg (174 lb)   SpO2 92%   BMI 28.82 kg/m      Physical Exam    GENERAL APPEARANCE: healthy, alert and no distress     EYES: EOMI,  PERRL     HENT: ear canals and TM's normal and nose and mouth without ulcers or lesions     NECK: no adenopathy, no asymmetry, masses, or scars and thyroid normal to palpation     RESP: lungs clear to auscultation - no rales, rhonchi or wheezes     CV: regular rate and rhythm, normal S1 S2, no S3 or S4 and no murmur, click or rub     ABDOMEN:  soft, nontender, no HSM or masses and bowel sounds normal     MS: extremities normal- no gross deformities noted, no evidence of inflammation in joints, FROM in all extremities.     SKIN: no suspicious lesions " or rashes     NEURO: Normal strength and tone, sensory exam grossly normal, mentation intact and speech normal. Gait is stable.     PSYCH: mentation appears normal. and affect normal/bright     LYMPHATICS: No cervical adenopathy    Recent Labs   Lab Test 04/29/22  1057 01/28/22  1150 01/28/22  1149   HGB 13.5 13.8  --     277  --      --  141   POTASSIUM 3.6  --  3.7   CR 0.95  --  0.93        Diagnostics:  No labs were ordered during this visit.   No EKG required for low risk surgery (cataract, skin procedure, breast biopsy, etc).    Revised Cardiac Risk Index (RCRI):  The patient has the following serious cardiovascular risks for perioperative complications:   - No serious cardiac risks = 0 points     RCRI Interpretation: 0 points: Class I (very low risk - 0.4% complication rate)           Signed Electronically by: Damaso Anderson PA-C  Copy of this evaluation report is provided to requesting physician.

## 2022-07-15 ENCOUNTER — ANESTHESIA EVENT (OUTPATIENT)
Dept: SURGERY | Facility: AMBULATORY SURGERY CENTER | Age: 62
End: 2022-07-15
Payer: COMMERCIAL

## 2022-07-18 ENCOUNTER — ANESTHESIA (OUTPATIENT)
Dept: SURGERY | Facility: AMBULATORY SURGERY CENTER | Age: 62
End: 2022-07-18
Payer: COMMERCIAL

## 2022-07-18 ENCOUNTER — HOSPITAL ENCOUNTER (OUTPATIENT)
Facility: AMBULATORY SURGERY CENTER | Age: 62
Discharge: HOME | End: 2022-07-18
Attending: INTERNAL MEDICINE
Payer: COMMERCIAL

## 2022-07-18 VITALS
SYSTOLIC BLOOD PRESSURE: 128 MMHG | HEART RATE: 88 BPM | DIASTOLIC BLOOD PRESSURE: 74 MMHG | RESPIRATION RATE: 16 BRPM | OXYGEN SATURATION: 93 % | TEMPERATURE: 97.2 F

## 2022-07-18 VITALS — HEART RATE: 77 BPM

## 2022-07-18 LAB — COLONOSCOPY: NORMAL

## 2022-07-18 PROCEDURE — 45385 COLONOSCOPY W/LESION REMOVAL: CPT

## 2022-07-18 PROCEDURE — 88305 TISSUE EXAM BY PATHOLOGIST: CPT | Mod: 26 | Performed by: PATHOLOGY

## 2022-07-18 PROCEDURE — G8918 PT W/O PREOP ORDER IV AB PRO: HCPCS

## 2022-07-18 PROCEDURE — G8907 PT DOC NO EVENTS ON DISCHARG: HCPCS

## 2022-07-18 RX ORDER — NALOXONE HYDROCHLORIDE 0.4 MG/ML
0.4 INJECTION, SOLUTION INTRAMUSCULAR; INTRAVENOUS; SUBCUTANEOUS
Status: DISCONTINUED | OUTPATIENT
Start: 2022-07-18 | End: 2022-07-19 | Stop reason: HOSPADM

## 2022-07-18 RX ORDER — LIDOCAINE 40 MG/G
CREAM TOPICAL
Status: DISCONTINUED | OUTPATIENT
Start: 2022-07-18 | End: 2022-07-19 | Stop reason: HOSPADM

## 2022-07-18 RX ORDER — PROPOFOL 10 MG/ML
INJECTION, EMULSION INTRAVENOUS PRN
Status: DISCONTINUED | OUTPATIENT
Start: 2022-07-18 | End: 2022-07-18

## 2022-07-18 RX ORDER — ONDANSETRON 2 MG/ML
4 INJECTION INTRAMUSCULAR; INTRAVENOUS
Status: DISCONTINUED | OUTPATIENT
Start: 2022-07-18 | End: 2022-07-19 | Stop reason: HOSPADM

## 2022-07-18 RX ORDER — FLUMAZENIL 0.1 MG/ML
0.2 INJECTION, SOLUTION INTRAVENOUS
Status: ACTIVE | OUTPATIENT
Start: 2022-07-18 | End: 2022-07-18

## 2022-07-18 RX ORDER — ONDANSETRON 2 MG/ML
4 INJECTION INTRAMUSCULAR; INTRAVENOUS EVERY 30 MIN PRN
Status: DISCONTINUED | OUTPATIENT
Start: 2022-07-18 | End: 2022-07-19 | Stop reason: HOSPADM

## 2022-07-18 RX ORDER — ONDANSETRON 2 MG/ML
4 INJECTION INTRAMUSCULAR; INTRAVENOUS EVERY 6 HOURS PRN
Status: DISCONTINUED | OUTPATIENT
Start: 2022-07-18 | End: 2022-07-19 | Stop reason: HOSPADM

## 2022-07-18 RX ORDER — ONDANSETRON 4 MG/1
4 TABLET, ORALLY DISINTEGRATING ORAL EVERY 6 HOURS PRN
Status: DISCONTINUED | OUTPATIENT
Start: 2022-07-18 | End: 2022-07-19 | Stop reason: HOSPADM

## 2022-07-18 RX ORDER — PROPOFOL 10 MG/ML
INJECTION, EMULSION INTRAVENOUS CONTINUOUS PRN
Status: DISCONTINUED | OUTPATIENT
Start: 2022-07-18 | End: 2022-07-18

## 2022-07-18 RX ORDER — ONDANSETRON 4 MG/1
4 TABLET, ORALLY DISINTEGRATING ORAL EVERY 30 MIN PRN
Status: DISCONTINUED | OUTPATIENT
Start: 2022-07-18 | End: 2022-07-19 | Stop reason: HOSPADM

## 2022-07-18 RX ORDER — LIDOCAINE HYDROCHLORIDE 20 MG/ML
INJECTION, SOLUTION INFILTRATION; PERINEURAL PRN
Status: DISCONTINUED | OUTPATIENT
Start: 2022-07-18 | End: 2022-07-18

## 2022-07-18 RX ORDER — SODIUM CHLORIDE, SODIUM LACTATE, POTASSIUM CHLORIDE, CALCIUM CHLORIDE 600; 310; 30; 20 MG/100ML; MG/100ML; MG/100ML; MG/100ML
INJECTION, SOLUTION INTRAVENOUS CONTINUOUS
Status: DISCONTINUED | OUTPATIENT
Start: 2022-07-18 | End: 2022-07-19 | Stop reason: HOSPADM

## 2022-07-18 RX ORDER — PROCHLORPERAZINE MALEATE 10 MG
10 TABLET ORAL EVERY 6 HOURS PRN
Status: DISCONTINUED | OUTPATIENT
Start: 2022-07-18 | End: 2022-07-19 | Stop reason: HOSPADM

## 2022-07-18 RX ORDER — NALOXONE HYDROCHLORIDE 0.4 MG/ML
0.2 INJECTION, SOLUTION INTRAMUSCULAR; INTRAVENOUS; SUBCUTANEOUS
Status: DISCONTINUED | OUTPATIENT
Start: 2022-07-18 | End: 2022-07-19 | Stop reason: HOSPADM

## 2022-07-18 RX ORDER — METOPROLOL TARTRATE 1 MG/ML
1-2 INJECTION, SOLUTION INTRAVENOUS EVERY 5 MIN PRN
Status: DISCONTINUED | OUTPATIENT
Start: 2022-07-18 | End: 2022-07-19 | Stop reason: HOSPADM

## 2022-07-18 RX ADMIN — PROPOFOL 150 MCG/KG/MIN: 10 INJECTION, EMULSION INTRAVENOUS at 07:47

## 2022-07-18 RX ADMIN — LIDOCAINE HYDROCHLORIDE 60 MG: 20 INJECTION, SOLUTION INFILTRATION; PERINEURAL at 07:47

## 2022-07-18 RX ADMIN — SODIUM CHLORIDE, SODIUM LACTATE, POTASSIUM CHLORIDE, CALCIUM CHLORIDE: 600; 310; 30; 20 INJECTION, SOLUTION INTRAVENOUS at 07:44

## 2022-07-18 RX ADMIN — PROPOFOL 100 MG: 10 INJECTION, EMULSION INTRAVENOUS at 07:47

## 2022-07-18 NOTE — ANESTHESIA PREPROCEDURE EVALUATION
Anesthesia Pre-Procedure Evaluation    Patient: Sun De La Garza   MRN: [id number] : 1960        Procedure : Procedure(s):  COLONOSCOPY, WITH CO2 INSUFFLATION          No past medical history on file.   Past Surgical History:   Procedure Laterality Date     COLONOSCOPY N/A 2014    Procedure: COLONOSCOPY;  Surgeon: Mj Morales MD;  Location:  GI     ESOPHAGOSCOPY, GASTROSCOPY, DUODENOSCOPY (EGD), COMBINED N/A 2014    Procedure: COMBINED ESOPHAGOSCOPY, GASTROSCOPY, DUODENOSCOPY (EGD), BIOPSY SINGLE OR MULTIPLE;  Surgeon: Mj Morales MD;  Location:  GI     GYN SURGERY      ablation      No Known Allergies   Social History     Tobacco Use     Smoking status: Former Smoker     Packs/day: 0.25     Years: 32.00     Pack years: 8.00     Start date: 1980     Quit date: 3/4/2012     Years since quitting: 10.3     Smokeless tobacco: Never Used   Substance Use Topics     Alcohol use: No     Comment: socailly      Wt Readings from Last 1 Encounters:   22 78.9 kg (174 lb)        Anesthesia Evaluation            ROS/MED HX  ENT/Pulmonary:     (+) sleep apnea, allergic rhinitis,     Neurologic:       Cardiovascular:       METS/Exercise Tolerance:     Hematologic:       Musculoskeletal:       GI/Hepatic:       Renal/Genitourinary:       Endo:       Psychiatric/Substance Use:     (+) psychiatric history anxiety and depression     Infectious Disease:       Malignancy:       Other:            Physical Exam    Airway  airway exam normal      Mallampati: II   TM distance: > 3 FB   Neck ROM: full   Mouth opening: > 3 cm    Respiratory Devices and Support         Dental  no notable dental history         Cardiovascular          Rhythm and rate: regular and normal     Pulmonary   pulmonary exam normal        breath sounds clear to auscultation           OUTSIDE LABS:  CBC:   Lab Results   Component Value Date    WBC 7.2 2022    WBC 6.8 2022    HGB 13.5 2022    HGB 13.8  01/28/2022    HCT 40.9 04/29/2022    HCT 42.9 01/28/2022     04/29/2022     01/28/2022     BMP:   Lab Results   Component Value Date     04/29/2022     01/28/2022    POTASSIUM 3.6 04/29/2022    POTASSIUM 3.7 01/28/2022    CHLORIDE 110 (H) 04/29/2022    CHLORIDE 109 01/28/2022    CO2 30 04/29/2022    CO2 27 01/28/2022    BUN 13 04/29/2022    BUN 13 01/28/2022    CR 0.95 04/29/2022    CR 0.93 01/28/2022     (H) 04/29/2022    GLC 92 01/28/2022     COAGS: No results found for: PTT, INR, FIBR  POC:   Lab Results   Component Value Date    HCG Negative 01/23/2013     HEPATIC:   Lab Results   Component Value Date    ALBUMIN 3.6 04/29/2022    PROTTOTAL 7.3 04/29/2022    ALT 26 04/29/2022    AST 17 04/29/2022    ALKPHOS 94 04/29/2022    BILITOTAL 0.4 04/29/2022     OTHER:   Lab Results   Component Value Date    DANELLE 9.0 04/29/2022    MAG 2.1 01/13/2005    LIPASE 127 04/29/2022    AMYLASE 51 07/28/2015    TSH 1.53 10/14/2019    CRP 25.2 (H) 03/24/2020    SED 19 05/29/2019       Anesthesia Plan    ASA Status:  2   NPO Status:  NPO Appropriate    Anesthesia Type: MAC.     - Reason for MAC: immobility needed, straight local not clinically adequate   Induction: Intravenous.   Maintenance: TIVA.        Consents    Anesthesia Plan(s) and associated risks, benefits, and realistic alternatives discussed. Questions answered and patient/representative(s) expressed understanding.    - Discussed:     - Discussed with:  Patient      - Extended Intubation/Ventilatory Support Discussed: No.      - Patient is DNR/DNI Status: No    Use of blood products discussed: No .     Postoperative Care    Pain management: IV analgesics, Oral pain medications, Multi-modal analgesia.   PONV prophylaxis: Ondansetron (or other 5HT-3), Background Propofol Infusion     Comments:                Abhishek Ramirez MD

## 2022-07-18 NOTE — ANESTHESIA POSTPROCEDURE EVALUATION
Patient: Sun De La Garza    Procedure: Procedure(s):  COLONOSCOPY, WITH CO2 INSUFFLATION  COLONOSCOPY, FLEXIBLE, WITH LESION REMOVAL USING SNARE       Anesthesia Type:  MAC    Note:  Disposition: Outpatient   Postop Pain Control: Uneventful            Sign Out: Well controlled pain   PONV: No   Neuro/Psych: Uneventful            Sign Out: Acceptable/Baseline neuro status   Airway/Respiratory: Uneventful            Sign Out: Acceptable/Baseline resp. status   CV/Hemodynamics: Uneventful            Sign Out: Acceptable CV status   Other NRE: NONE   DID A NON-ROUTINE EVENT OCCUR? No           Last vitals:  Vitals Value Taken Time   /74 07/18/22 0830   Temp 36.2  C (97.2  F) 07/18/22 0813   Pulse 88 07/18/22 0830   Resp 16 07/18/22 0830   SpO2 93 % 07/18/22 0830       Electronically Signed By: Abhishek Ramirez MD  July 18, 2022  4:07 PM

## 2022-07-18 NOTE — INTERVAL H&P NOTE
"I have reviewed the surgical (or preoperative) H&P that is linked to this encounter, and examined the patient. There are no significant changes    Clinical Conditions Present on Arrival:  Clinically Significant Risk Factors Present on Admission                   # Overweight: Estimated body mass index is 28.82 kg/m  as calculated from the following:    Height as of 7/6/22: 1.655 m (5' 5.16\").    Weight as of 7/6/22: 78.9 kg (174 lb).       "

## 2022-07-18 NOTE — ANESTHESIA CARE TRANSFER NOTE
Patient: Sun De La Garza    Procedure: Procedure(s):  COLONOSCOPY, WITH CO2 INSUFFLATION  COLONOSCOPY, FLEXIBLE, WITH LESION REMOVAL USING SNARE       Diagnosis: Diverticulitis of colon [K57.32]  Diagnosis Additional Information: No value filed.    Anesthesia Type:   MAC     Note:    Oropharynx: oropharynx clear of all foreign objects  Level of Consciousness: drowsy  Oxygen Supplementation: room air    Independent Airway: airway patency satisfactory and stable  Dentition: dentition unchanged  Vital Signs Stable: post-procedure vital signs reviewed and stable  Report to RN Given: handoff report given  Patient transferred to: Phase II  Comments: To Phase II. Report to RN.  VSS Resp status stable.  Handoff Report: Identifed the Patient, Identified the Reponsible Provider, Reviewed the pertinent medical history, Discussed the surgical course, Reviewed Intra-OP anesthesia mangement and issues during anesthesia, Set expectations for post-procedure period and Allowed opportunity for questions and acknowledgement of understanding      Vitals:  Vitals Value Taken Time   BP     Temp     Pulse     Resp     SpO2         Electronically Signed By: LEN Nur CRNA  July 18, 2022  8:11 AM

## 2022-07-19 LAB
PATH REPORT.COMMENTS IMP SPEC: NORMAL
PATH REPORT.COMMENTS IMP SPEC: NORMAL
PATH REPORT.FINAL DX SPEC: NORMAL
PATH REPORT.GROSS SPEC: NORMAL
PATH REPORT.MICROSCOPIC SPEC OTHER STN: NORMAL
PATH REPORT.RELEVANT HX SPEC: NORMAL
PHOTO IMAGE: NORMAL

## 2022-11-20 ENCOUNTER — HEALTH MAINTENANCE LETTER (OUTPATIENT)
Age: 62
End: 2022-11-20

## 2023-01-20 ENCOUNTER — ALLIED HEALTH/NURSE VISIT (OUTPATIENT)
Dept: FAMILY MEDICINE | Facility: OTHER | Age: 63
End: 2023-01-20
Payer: COMMERCIAL

## 2023-01-20 ENCOUNTER — NURSE TRIAGE (OUTPATIENT)
Dept: FAMILY MEDICINE | Facility: OTHER | Age: 63
End: 2023-01-20

## 2023-01-20 ENCOUNTER — HOSPITAL ENCOUNTER (EMERGENCY)
Facility: CLINIC | Age: 63
Discharge: HOME | End: 2023-01-20
Attending: PHYSICIAN ASSISTANT | Admitting: PHYSICIAN ASSISTANT
Payer: COMMERCIAL

## 2023-01-20 ENCOUNTER — APPOINTMENT (OUTPATIENT)
Dept: CT IMAGING | Facility: CLINIC | Age: 63
End: 2023-01-20
Attending: PHYSICIAN ASSISTANT
Payer: COMMERCIAL

## 2023-01-20 VITALS
BODY MASS INDEX: 33.2 KG/M2 | HEIGHT: 63 IN | RESPIRATION RATE: 18 BRPM | TEMPERATURE: 97.9 F | DIASTOLIC BLOOD PRESSURE: 79 MMHG | WEIGHT: 187.4 LBS | HEART RATE: 65 BPM | SYSTOLIC BLOOD PRESSURE: 122 MMHG | OXYGEN SATURATION: 96 %

## 2023-01-20 DIAGNOSIS — Z78.9 TRIAGE ASSESSMENT CLASS 2, URGENT: Primary | ICD-10-CM

## 2023-01-20 DIAGNOSIS — R10.32 LLQ ABDOMINAL PAIN: ICD-10-CM

## 2023-01-20 LAB
ALBUMIN SERPL BCG-MCNC: 4.5 G/DL (ref 3.5–5.2)
ALBUMIN UR-MCNC: NEGATIVE MG/DL
ALP SERPL-CCNC: 107 U/L (ref 35–104)
ALT SERPL W P-5'-P-CCNC: 17 U/L (ref 10–35)
ANION GAP SERPL CALCULATED.3IONS-SCNC: 12 MMOL/L (ref 7–15)
APPEARANCE UR: CLEAR
AST SERPL W P-5'-P-CCNC: 17 U/L (ref 10–35)
BASOPHILS # BLD AUTO: 0.1 10E3/UL (ref 0–0.2)
BASOPHILS NFR BLD AUTO: 1 %
BILIRUB SERPL-MCNC: 0.4 MG/DL
BILIRUB UR QL STRIP: NEGATIVE
BUN SERPL-MCNC: 13.8 MG/DL (ref 8–23)
CALCIUM SERPL-MCNC: 9.5 MG/DL (ref 8.8–10.2)
CHLORIDE SERPL-SCNC: 103 MMOL/L (ref 98–107)
COLOR UR AUTO: YELLOW
CREAT SERPL-MCNC: 0.82 MG/DL (ref 0.51–0.95)
CRP SERPL-MCNC: 4.21 MG/L
DEPRECATED HCO3 PLAS-SCNC: 26 MMOL/L (ref 22–29)
EOSINOPHIL # BLD AUTO: 0.4 10E3/UL (ref 0–0.7)
EOSINOPHIL NFR BLD AUTO: 4 %
ERYTHROCYTE [DISTWIDTH] IN BLOOD BY AUTOMATED COUNT: 12.8 % (ref 10–15)
GFR SERPL CREATININE-BSD FRML MDRD: 81 ML/MIN/1.73M2
GLUCOSE SERPL-MCNC: 90 MG/DL (ref 70–99)
GLUCOSE UR STRIP-MCNC: NEGATIVE MG/DL
HCT VFR BLD AUTO: 42 % (ref 35–47)
HGB BLD-MCNC: 13.6 G/DL (ref 11.7–15.7)
HGB UR QL STRIP: NEGATIVE
IMM GRANULOCYTES # BLD: 0 10E3/UL
IMM GRANULOCYTES NFR BLD: 0 %
KETONES UR STRIP-MCNC: NEGATIVE MG/DL
LEUKOCYTE ESTERASE UR QL STRIP: ABNORMAL
LYMPHOCYTES # BLD AUTO: 4.6 10E3/UL (ref 0.8–5.3)
LYMPHOCYTES NFR BLD AUTO: 49 %
MCH RBC QN AUTO: 28.3 PG (ref 26.5–33)
MCHC RBC AUTO-ENTMCNC: 32.4 G/DL (ref 31.5–36.5)
MCV RBC AUTO: 88 FL (ref 78–100)
MONOCYTES # BLD AUTO: 0.7 10E3/UL (ref 0–1.3)
MONOCYTES NFR BLD AUTO: 8 %
MUCOUS THREADS #/AREA URNS LPF: PRESENT /LPF
NEUTROPHILS # BLD AUTO: 3.5 10E3/UL (ref 1.6–8.3)
NEUTROPHILS NFR BLD AUTO: 38 %
NITRATE UR QL: NEGATIVE
NRBC # BLD AUTO: 0 10E3/UL
NRBC BLD AUTO-RTO: 0 /100
PH UR STRIP: 6 [PH] (ref 5–7)
PLATELET # BLD AUTO: 270 10E3/UL (ref 150–450)
POTASSIUM SERPL-SCNC: 3.7 MMOL/L (ref 3.4–5.3)
PROT SERPL-MCNC: 7.6 G/DL (ref 6.4–8.3)
RBC # BLD AUTO: 4.8 10E6/UL (ref 3.8–5.2)
RBC URINE: 1 /HPF
SODIUM SERPL-SCNC: 141 MMOL/L (ref 136–145)
SP GR UR STRIP: 1.02 (ref 1–1.03)
SQUAMOUS EPITHELIAL: <1 /HPF
UROBILINOGEN UR STRIP-MCNC: NORMAL MG/DL
WBC # BLD AUTO: 9.4 10E3/UL (ref 4–11)
WBC URINE: 1 /HPF

## 2023-01-20 PROCEDURE — 99284 EMERGENCY DEPT VISIT MOD MDM: CPT | Performed by: PHYSICIAN ASSISTANT

## 2023-01-20 PROCEDURE — 85025 COMPLETE CBC W/AUTO DIFF WBC: CPT | Performed by: PHYSICIAN ASSISTANT

## 2023-01-20 PROCEDURE — 99285 EMERGENCY DEPT VISIT HI MDM: CPT | Mod: 25 | Performed by: PHYSICIAN ASSISTANT

## 2023-01-20 PROCEDURE — 80053 COMPREHEN METABOLIC PANEL: CPT | Performed by: PHYSICIAN ASSISTANT

## 2023-01-20 PROCEDURE — 86140 C-REACTIVE PROTEIN: CPT | Performed by: PHYSICIAN ASSISTANT

## 2023-01-20 PROCEDURE — 36415 COLL VENOUS BLD VENIPUNCTURE: CPT | Performed by: PHYSICIAN ASSISTANT

## 2023-01-20 PROCEDURE — 250N000011 HC RX IP 250 OP 636: Performed by: PHYSICIAN ASSISTANT

## 2023-01-20 PROCEDURE — 74177 CT ABD & PELVIS W/CONTRAST: CPT

## 2023-01-20 PROCEDURE — 81001 URINALYSIS AUTO W/SCOPE: CPT | Performed by: PHYSICIAN ASSISTANT

## 2023-01-20 PROCEDURE — 250N000009 HC RX 250: Performed by: PHYSICIAN ASSISTANT

## 2023-01-20 PROCEDURE — 99207 PR NO CHARGE NURSE ONLY: CPT

## 2023-01-20 RX ORDER — IOPAMIDOL 755 MG/ML
500 INJECTION, SOLUTION INTRAVASCULAR ONCE
Status: COMPLETED | OUTPATIENT
Start: 2023-01-20 | End: 2023-01-20

## 2023-01-20 RX ADMIN — IOPAMIDOL 88 ML: 755 INJECTION, SOLUTION INTRAVENOUS at 20:09

## 2023-01-20 RX ADMIN — SODIUM CHLORIDE 60 ML: 9 INJECTION, SOLUTION INTRAVENOUS at 20:09

## 2023-01-20 ASSESSMENT — ACTIVITIES OF DAILY LIVING (ADL)
ADLS_ACUITY_SCORE: 35
ADLS_ACUITY_SCORE: 35

## 2023-01-20 NOTE — TELEPHONE ENCOUNTER
Patient has had abdominal pain for 1 month.  Pain is located in lower left quadrant.  Patient has history of diverticulitis.    Pain started increasing 1 week ago.  Pain is 8 out of 10.    She has a constant dull ache.    Per protocol patient advised to be seen today.  Huddled with Matthew Couch PA-C. -Provider offered to see patient today.  Patient would prefer to go to the ER.  Ivory Reyna RN on 1/20/2023 at 4:05 PM    Reason for Disposition    Constant abdominal pain lasting > 2 hours    Additional Information    Negative: Passed out (i.e., fainted, collapsed and was not responding)    Negative: Shock suspected (e.g., cold/pale/clammy skin, too weak to stand, low BP, rapid pulse)    Negative: Sounds like a life-threatening emergency to the triager    Negative: Chest pain    Negative: Pain is mainly in upper abdomen (if needed ask: 'is it mainly above the belly button?')    Negative: Abdominal pain and pregnant < 20 weeks    Negative: Abdominal pain and pregnant 20 or more weeks    Negative: SEVERE abdominal pain (e.g., excruciating)    Negative: Vomiting red blood or black (coffee ground) material    Negative: Bloody, black, or tarry bowel movements  (Exception: Chronic-unchanged black-grey bowel movements and is taking iron pills or Pepto-Bismol.)    Protocols used: ABDOMINAL PAIN - FEMALE-A-OH

## 2023-01-20 NOTE — ED TRIAGE NOTES
Comes in with lower left quadrant pain that she has had for about 1 month, states it has gotten worse today.       Triage Assessment     Row Name 01/20/23 4958       Triage Assessment (Adult)    Airway WDL WDL       Respiratory WDL    Respiratory WDL WDL       Skin Circulation/Temperature WDL    Skin Circulation/Temperature WDL WDL       Cardiac WDL    Cardiac WDL WDL       Cognitive/Neuro/Behavioral WDL    Cognitive/Neuro/Behavioral WDL WDL

## 2023-01-21 NOTE — DISCHARGE INSTRUCTIONS
It was a pleasure working with you today!  I hope your condition improves rapidly!     Thankfully, we did not find any emergent cause for your abdominal pain today with your CT and laboratory work-up.  Your urine test was normal as well.  I am concerned that you could be experiencing pain from an abdominal muscle wall strain.  Try heat over the painful area for 20 minutes every 2-3 hours.  Try and avoid lifting over 5 pounds until this area heals.  Please also try and avoid any type of movement or activity that causes increased pain.  Follow-up with your primary care provider early next week to recheck your condition and follow-up with your progress.  It is okay to use ibuprofen 600 mg every 6 hours with food as needed for inflammation and discomfort.

## 2023-01-21 NOTE — ED PROVIDER NOTES
"  History     Chief Complaint   Patient presents with     Abdominal Pain     HPI  Sun De La Garza is a 62 year old female who presents for evaluation of left lower quadrant abdominal pain off and on for the past 1 month.  Now experiencing daily symptoms in the last week.  She describes the pain as a \"contraction-like pain \"rated upwards of 8 on a scale of 10.  Seems to be worse with standing or moving.  Better with rest.  Experiences 2-3 bowel movements per day in small amounts which is typical for her.  No recent change.  No blood or pus in the stool.  No melena.  She denies any dysuria, frequency, urgency, or or gross hematuria.  No fever, chills, or vomiting.  She did experience nausea for the first time today.  Has not taken anything for the pain.  No recent injury to the abdomen.  Denies any bulge or masses of the abdomen.  No skin rashes.  She is very concerned that she has a case of diverticulitis, as she has had this multiple times in the past.        Allergies:  No Known Allergies    Problem List:    Patient Active Problem List    Diagnosis Date Noted     Irritable bowel syndrome with both constipation and diarrhea 04/19/2018     Priority: Medium     Sleep apnea, unspecified type 09/13/2017     Priority: Medium     IBS (irritable bowel syndrome) 03/16/2016     Priority: Medium     Diverticulosis of large intestine 03/16/2016     Priority: Medium     Vitamin D deficiency 09/05/2014     Priority: Medium     Problem list name updated by automated process. Provider to review       Depression with anxiety 09/05/2014     Priority: Medium     CARDIOVASCULAR SCREENING; LDL GOAL LESS THAN 160 01/04/2013     Priority: Medium     Chronic abdominal pain 01/04/2013     Priority: Medium     Skin tag 01/04/2013     Priority: Medium     Sebaceous cyst 01/04/2013     Priority: Medium     head       Internal hemorrhoids 08/25/1997     Priority: Medium     Allergic rhinitis 08/25/1997     Priority: Medium     Depressive " disorder 08/25/1997     Priority: Medium     Constipation 08/25/1997     Priority: Medium        Past Medical History:    History reviewed. No pertinent past medical history.    Past Surgical History:    Past Surgical History:   Procedure Laterality Date     COLONOSCOPY N/A 9/22/2014    Procedure: COLONOSCOPY;  Surgeon: Mj Morales MD;  Location:  GI     COLONOSCOPY N/A 7/18/2022    Procedure: COLONOSCOPY, FLEXIBLE, WITH LESION REMOVAL USING SNARE;  Surgeon: Jomar Gunter MD;  Location: [facility] OR     COLONOSCOPY WITH CO2 INSUFFLATION N/A 7/18/2022    Procedure: COLONOSCOPY, WITH CO2 INSUFFLATION;  Surgeon: Jomar Gunter MD;  Location: [facility] OR     ESOPHAGOSCOPY, GASTROSCOPY, DUODENOSCOPY (EGD), COMBINED N/A 9/22/2014    Procedure: COMBINED ESOPHAGOSCOPY, GASTROSCOPY, DUODENOSCOPY (EGD), BIOPSY SINGLE OR MULTIPLE;  Surgeon: Mj Morales MD;  Location:  GI     GYN SURGERY      ablation       Family History:    Family History   Problem Relation Age of Onset     Other Cancer Father      Melanoma Father      Hypertension Father      Hyperlipidemia Mother      Hypertension Brother      Hypertension Brother      Anesthesia Reaction No family hx of        Social History:  Marital Status:  Single [1]  Social History     Tobacco Use     Smoking status: Former     Packs/day: 0.25     Years: 32.00     Pack years: 8.00     Types: Cigarettes     Start date: 5/9/1980     Quit date: 3/4/2012     Years since quitting: 10.8     Smokeless tobacco: Never   Vaping Use     Vaping Use: Never used   Substance Use Topics     Alcohol use: No     Comment: socailly     Drug use: No        Medications:    aspirin 81 MG tablet  buPROPion (WELLBUTRIN XL) 300 MG 24 hr tablet  Calcium Carbonate-Vitamin D (CALCIUM 500 + D PO)  FLUoxetine (PROZAC) 10 MG capsule  omeprazole (PRILOSEC) 40 MG DR capsule  ondansetron (ZOFRAN ODT) 4 MG ODT tab          Review of Systems   All other systems reviewed and are  "negative.      Physical Exam   BP: 117/83  Pulse: 65  Temp: 97.9  F (36.6  C)  Resp: 18  Height: 160 cm (5' 3\")  Weight: 85 kg (187 lb 6.4 oz)  SpO2: 96 %      Physical Exam  Vitals and nursing note reviewed.   Constitutional:       General: She is not in acute distress.     Appearance: She is not diaphoretic.   HENT:      Head: Normocephalic and atraumatic.      Right Ear: External ear normal.      Left Ear: External ear normal.      Nose: Nose normal.      Mouth/Throat:      Pharynx: No oropharyngeal exudate.   Eyes:      General: No scleral icterus.        Right eye: No discharge.         Left eye: No discharge.      Conjunctiva/sclera: Conjunctivae normal.      Pupils: Pupils are equal, round, and reactive to light.   Neck:      Thyroid: No thyromegaly.   Cardiovascular:      Rate and Rhythm: Normal rate and regular rhythm.      Heart sounds: Normal heart sounds. No murmur heard.  Pulmonary:      Effort: Pulmonary effort is normal. No respiratory distress.      Breath sounds: Normal breath sounds. No wheezing or rales.   Chest:      Chest wall: No tenderness.   Abdominal:      General: Bowel sounds are normal. There is no distension.      Palpations: Abdomen is soft. There is no mass.      Tenderness: There is abdominal tenderness in the left lower quadrant. There is no right CVA tenderness, left CVA tenderness, guarding or rebound. Negative signs include Nguyen's sign, Rovsing's sign, McBurney's sign and psoas sign.      Hernia: There is no hernia in the umbilical area or ventral area.   Musculoskeletal:         General: No tenderness or deformity. Normal range of motion.      Cervical back: Normal range of motion and neck supple.   Lymphadenopathy:      Cervical: No cervical adenopathy.   Skin:     General: Skin is warm and dry.      Capillary Refill: Capillary refill takes less than 2 seconds.      Findings: No erythema or rash.   Neurological:      Mental Status: She is alert and oriented to person, place, " and time.      Cranial Nerves: No cranial nerve deficit.   Psychiatric:         Behavior: Behavior normal.         Thought Content: Thought content normal.         ED Course                 Procedures              Critical Care time:  none               Results for orders placed or performed during the hospital encounter of 01/20/23 (from the past 24 hour(s))   CBC with platelets differential    Narrative    The following orders were created for panel order CBC with platelets differential.  Procedure                               Abnormality         Status                     ---------                               -----------         ------                     CBC with platelets and d...[[id number]]                      Final result                 Please view results for these tests on the individual orders.   Comprehensive metabolic panel   Result Value Ref Range    Sodium 141 136 - 145 mmol/L    Potassium 3.7 3.4 - 5.3 mmol/L    Chloride 103 98 - 107 mmol/L    Carbon Dioxide (CO2) 26 22 - 29 mmol/L    Anion Gap 12 7 - 15 mmol/L    Urea Nitrogen 13.8 8.0 - 23.0 mg/dL    Creatinine 0.82 0.51 - 0.95 mg/dL    Calcium 9.5 8.8 - 10.2 mg/dL    Glucose 90 70 - 99 mg/dL    Alkaline Phosphatase 107 (H) 35 - 104 U/L    AST 17 10 - 35 U/L    ALT 17 10 - 35 U/L    Protein Total 7.6 6.4 - 8.3 g/dL    Albumin 4.5 3.5 - 5.2 g/dL    Bilirubin Total 0.4 <=1.2 mg/dL    GFR Estimate 81 >60 mL/min/1.73m2   CRP inflammation   Result Value Ref Range    CRP Inflammation 4.21 <5.00 mg/L   UA with Microscopic reflex to Culture    Specimen: Urine, NOS   Result Value Ref Range    Color Urine Yellow Colorless, Straw, Light Yellow, Yellow    Appearance Urine Clear Clear    Glucose Urine Negative Negative mg/dL    Bilirubin Urine Negative Negative    Ketones Urine Negative Negative mg/dL    Specific Gravity Urine 1.020 1.003 - 1.035    Blood Urine Negative Negative    pH Urine 6.0 5.0 - 7.0    Protein Albumin Urine Negative Negative mg/dL     Urobilinogen Urine Normal Normal, 2.0 mg/dL    Nitrite Urine Negative Negative    Leukocyte Esterase Urine Trace (A) Negative    Mucus Urine Present (A) None Seen /LPF    RBC Urine 1 <=2 /HPF    WBC Urine 1 <=5 /HPF    Squamous Epithelials Urine <1 <=1 /HPF    Narrative    Urine Culture not indicated   CBC with platelets and differential   Result Value Ref Range    WBC Count 9.4 4.0 - 11.0 10e3/uL    RBC Count 4.80 3.80 - 5.20 10e6/uL    Hemoglobin 13.6 11.7 - 15.7 g/dL    Hematocrit 42.0 35.0 - 47.0 %    MCV 88 78 - 100 fL    MCH 28.3 26.5 - 33.0 pg    MCHC 32.4 31.5 - 36.5 g/dL    RDW 12.8 10.0 - 15.0 %    Platelet Count 270 150 - 450 10e3/uL    % Neutrophils 38 %    % Lymphocytes 49 %    % Monocytes 8 %    % Eosinophils 4 %    % Basophils 1 %    % Immature Granulocytes 0 %    NRBCs per 100 WBC 0 <1 /100    Absolute Neutrophils 3.5 1.6 - 8.3 10e3/uL    Absolute Lymphocytes 4.6 0.8 - 5.3 10e3/uL    Absolute Monocytes 0.7 0.0 - 1.3 10e3/uL    Absolute Eosinophils 0.4 0.0 - 0.7 10e3/uL    Absolute Basophils 0.1 0.0 - 0.2 10e3/uL    Absolute Immature Granulocytes 0.0 <=0.4 10e3/uL    Absolute NRBCs 0.0 10e3/uL   CT Abdomen Pelvis w Contrast    Narrative    EXAM: CT ABDOMEN PELVIS W CONTRAST  LOCATION: [facility]  DATE/TIME: 1/20/2023 8:15 PM    INDICATION: LLQ abdominal pain  COMPARISON: 4/29/2022  TECHNIQUE: CT scan of the abdomen and pelvis was performed following injection of IV contrast. Multiplanar reformats were obtained. Dose reduction techniques were used.  CONTRAST: 88mLs Isovue 370    FINDINGS:   LOWER CHEST: Mild dependent atelectasis bilaterally. Strand of scarring or atelectasis left lower lobe.    HEPATOBILIARY: Normal.    PANCREAS: Normal.    SPLEEN: Normal.    ADRENAL GLANDS: Normal.    KIDNEYS/BLADDER: Normal.    BOWEL: Scattered colonic diverticula. No diverticulitis. Normal appendix.    LYMPH NODES: Normal.    VASCULATURE: Normal.    PELVIC ORGANS:  Normal.    MUSCULOSKELETAL: Calcified injection granuloma left buttock.      Impression    IMPRESSION:   No findings to account for abdominal pain       Medications   iopamidol (ISOVUE-370) solution 500 mL (88 mLs Intravenous Given 1/20/23 2009)   Sodium Chloride 0.9 % bag 100mL for CT scan (60 mLs Intravenous Given 1/20/23 2009)       Assessments & Plan (with Medical Decision Making)  LLQ abdominal pain     62 year old female presents for evaluation of left lower quadrant abdominal discomfort off and on for the past 1 month.  Worse in the past week.  Concern regarding diverticulitis.  No GI or  symptoms.  Nausea today but no fever, chills, or vomiting.  See HPI above for details.  Has had multiple cases of diverticulitis in the past.  On exam blood pressure 122/79, temperature 97.9, pulse 65, respiration 18, oxygen saturation 96% on room air.  Patient is in no acute distress.  Cardiopulmonary exam normal.  Abdomen soft.  Minor tenderness in the left lower quadrant.  No rebound or guarding.  No CVA tenderness.  Remainder of the exam is otherwise negative.  IV was established.  Full laboratory work-up including CBC, CRP, comprehensive metabolic panel, and urinalysis all within normal limits.  CT of the abdomen/pelvis with no acute findings.  I reviewed the CT as well.  There is normal stool burden.  No sign of constipation.  No masses identified.  No kidney stones.  No inflammatory changes in the abdomen.  Reassured the patient that we did not identify any acute emergent abnormalities with her full work-up today.  Specifically, we do not find any evidence to suggest a repeat case of diverticulitis.  I reassured the patient.  She certainly could have an abdominal muscle wall strain.  She reports that she has started a new workout regimen and has been walking a lot of stairs.  She denies any recent fall.  On repeat exam, I did not identify any hernia type defect.  This also was not noted on CT.  Encouraged her to  rest.  Avoid any activities that cause pain.  Do not lift over 5 pounds for a week to see if this helps.  Use warm compresses over the area for 20 minutes every few hours.  Ibuprofen 600 mg every 6 hours can be used for inflammation and discomfort.  Follow-up with primary care in the next 5-7 days to recheck her status and determine if anything further needs to be done.  ED return instructions reviewed with the patient in detail.  She was in agreement.     I have reviewed the nursing notes.    I have reviewed the findings, diagnosis, plan and need for follow up with the patient.       Medical Decision Making  The patient presented with a problem that is acute and uncomplicated.    The patient's evaluation involved:  ordering and review of 3+ test(s) (see separate area of note for details)    The patient's management involved only simple and very low risk treatment.        Discharge Medication List as of 1/20/2023  8:55 PM          Final diagnoses:   LLQ abdominal pain     Disclaimer: This note consists of symbols derived from keyboarding, dictation and/or voice recognition software. As a result, there may be errors in the script that have gone undetected. Please consider this when interpreting information found in this chart.      1/20/2023   [facility] EMERGENCY DEPT     Eduardo Rodriguez PA-C  01/20/23 [id number]

## 2023-04-15 ENCOUNTER — HEALTH MAINTENANCE LETTER (OUTPATIENT)
Age: 63
End: 2023-04-15

## 2023-07-25 DIAGNOSIS — K21.00 GASTROESOPHAGEAL REFLUX DISEASE WITH ESOPHAGITIS WITHOUT HEMORRHAGE: ICD-10-CM

## 2023-07-25 RX ORDER — OMEPRAZOLE 40 MG/1
CAPSULE, DELAYED RELEASE ORAL
Qty: 90 CAPSULE | Refills: 0 | Status: SHIPPED | OUTPATIENT
Start: 2023-07-25 | End: 2023-09-21

## 2023-07-25 NOTE — TELEPHONE ENCOUNTER
Medication Question or Refill    Contacts         Type Contact Phone/Fax    07/25/2023 04:15 PM CDT Phone (Incoming) Sun De La Garza (Self) [phone] (M)            What medication are you calling about (include dose and sig)?: Omeprazole 40mg capsules     Preferred Pharmacy:     [facility] [address]  [address] [address]  Phone: [phone] Fax: [phone]      Controlled Substance Agreement on file:   CSA -- Patient Level:    CSA: None found at the patient level.       Who prescribed the medication?: Katya Schulte but is now a pt of Leon Anderson's     Do you need a refill? Yes    When did you use the medication last? Daily, but is out next week if she takes it every other day     Patient offered an appointment? Yes: is wondering if this can be refilled with out an appt as Damaso Anderson is booked out until 9/21. Pt has scheduled a med check for first available with PCP (kerri) Please Update care team as well. :)    Do you have any questions or concerns?  Yes: is having heartburn due to under dosing      [facility] or would you prefer to receive a phone call?:   Patient would prefer a phone call   Okay to leave a detailed message?: Yes at Cell number on file:    Telephone Information:   Mobile [phone]

## 2023-07-25 NOTE — TELEPHONE ENCOUNTER
Pending Prescriptions:                       Disp   Refills    omeprazole (PRILOSEC) 40 MG DR capsule    90 cap*0            Sig: TAKE 1 CAPSULE BY MOUTH  DAILY 30-60 MINUTES           BEFORE  A MEAL.    Medication is being filled for 1 time amie refill only due to:  Patient is due for visit.   Next 5 appointments (look out 90 days)      Sep 21, 2023  5:00 PM  (Arrive by 4:40 PM)  Provider Visit with Damaso Anderson PA-C  [facility] ([facility] ) [address] [place] [id number] [phone]              Please call and help schedule.  Thank you!

## 2023-09-12 ENCOUNTER — OFFICE VISIT (OUTPATIENT)
Dept: PODIATRY | Facility: CLINIC | Age: 63
End: 2023-09-12
Payer: COMMERCIAL

## 2023-09-12 VITALS
SYSTOLIC BLOOD PRESSURE: 122 MMHG | DIASTOLIC BLOOD PRESSURE: 74 MMHG | HEIGHT: 63 IN | BODY MASS INDEX: 33.31 KG/M2 | WEIGHT: 188 LBS

## 2023-09-12 DIAGNOSIS — G60.9 IDIOPATHIC PERIPHERAL NEUROPATHY: Primary | ICD-10-CM

## 2023-09-12 PROCEDURE — 99203 OFFICE O/P NEW LOW 30 MIN: CPT | Performed by: PODIATRIST

## 2023-09-12 ASSESSMENT — PAIN SCALES - GENERAL: PAINLEVEL: SEVERE PAIN (6)
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
## 2023-09-21 ENCOUNTER — OFFICE VISIT (OUTPATIENT)
Dept: FAMILY MEDICINE | Facility: OTHER | Age: 63
End: 2023-09-21
Payer: COMMERCIAL

## 2023-09-21 VITALS
WEIGHT: 189.5 LBS | TEMPERATURE: 97.5 F | HEIGHT: 63 IN | SYSTOLIC BLOOD PRESSURE: 104 MMHG | RESPIRATION RATE: 14 BRPM | OXYGEN SATURATION: 93 % | HEART RATE: 75 BPM | DIASTOLIC BLOOD PRESSURE: 72 MMHG | BODY MASS INDEX: 33.58 KG/M2

## 2023-09-21 DIAGNOSIS — M54.12 CERVICAL RADICULOPATHY: ICD-10-CM

## 2023-09-21 DIAGNOSIS — K21.00 GASTROESOPHAGEAL REFLUX DISEASE WITH ESOPHAGITIS WITHOUT HEMORRHAGE: ICD-10-CM

## 2023-09-21 DIAGNOSIS — M54.42 ACUTE MIDLINE LOW BACK PAIN WITH LEFT-SIDED SCIATICA: ICD-10-CM

## 2023-09-21 DIAGNOSIS — E66.811 CLASS 1 OBESITY DUE TO EXCESS CALORIES WITHOUT SERIOUS COMORBIDITY WITH BODY MASS INDEX (BMI) OF 33.0 TO 33.9 IN ADULT: ICD-10-CM

## 2023-09-21 DIAGNOSIS — F41.8 DEPRESSION WITH ANXIETY: Primary | ICD-10-CM

## 2023-09-21 DIAGNOSIS — R20.2 PARESTHESIA OF FOOT, BILATERAL: ICD-10-CM

## 2023-09-21 DIAGNOSIS — Z12.31 VISIT FOR SCREENING MAMMOGRAM: ICD-10-CM

## 2023-09-21 DIAGNOSIS — R73.03 PREDIABETES: ICD-10-CM

## 2023-09-21 DIAGNOSIS — E66.09 CLASS 1 OBESITY DUE TO EXCESS CALORIES WITHOUT SERIOUS COMORBIDITY WITH BODY MASS INDEX (BMI) OF 33.0 TO 33.9 IN ADULT: ICD-10-CM

## 2023-09-21 LAB
ANION GAP SERPL CALCULATED.3IONS-SCNC: 10 MMOL/L (ref 7–15)
BUN SERPL-MCNC: 16.7 MG/DL (ref 8–23)
CALCIUM SERPL-MCNC: 9.1 MG/DL (ref 8.8–10.2)
CHLORIDE SERPL-SCNC: 107 MMOL/L (ref 98–107)
CREAT SERPL-MCNC: 0.84 MG/DL (ref 0.51–0.95)
DEPRECATED HCO3 PLAS-SCNC: 26 MMOL/L (ref 22–29)
EGFRCR SERPLBLD CKD-EPI 2021: 79 ML/MIN/1.73M2
GLUCOSE SERPL-MCNC: 108 MG/DL (ref 70–99)
HBA1C MFR BLD: 5.8 % (ref 0–5.6)
POTASSIUM SERPL-SCNC: 3.8 MMOL/L (ref 3.4–5.3)
SODIUM SERPL-SCNC: 143 MMOL/L (ref 136–145)
TSH SERPL DL<=0.005 MIU/L-ACNC: 2.17 UIU/ML (ref 0.3–4.2)

## 2023-09-21 PROCEDURE — 36415 COLL VENOUS BLD VENIPUNCTURE: CPT | Performed by: PHYSICIAN ASSISTANT

## 2023-09-21 PROCEDURE — 80048 BASIC METABOLIC PNL TOTAL CA: CPT | Performed by: PHYSICIAN ASSISTANT

## 2023-09-21 PROCEDURE — 99215 OFFICE O/P EST HI 40 MIN: CPT | Mod: 25 | Performed by: PHYSICIAN ASSISTANT

## 2023-09-21 PROCEDURE — 82607 VITAMIN B-12: CPT | Performed by: PHYSICIAN ASSISTANT

## 2023-09-21 PROCEDURE — 96127 BRIEF EMOTIONAL/BEHAV ASSMT: CPT | Mod: 59 | Performed by: PHYSICIAN ASSISTANT

## 2023-09-21 PROCEDURE — 83036 HEMOGLOBIN GLYCOSYLATED A1C: CPT | Performed by: PHYSICIAN ASSISTANT

## 2023-09-21 PROCEDURE — 84443 ASSAY THYROID STIM HORMONE: CPT | Performed by: PHYSICIAN ASSISTANT

## 2023-09-21 RX ORDER — FLUOXETINE 10 MG/1
10 CAPSULE ORAL DAILY
Qty: 90 CAPSULE | Refills: 1 | Status: SHIPPED | OUTPATIENT
Start: 2023-09-21 | End: 2024-02-01

## 2023-09-21 RX ORDER — BUPROPION HYDROCHLORIDE 300 MG/1
TABLET ORAL
Qty: 90 TABLET | Refills: 1 | Status: SHIPPED | OUTPATIENT
Start: 2023-09-21 | End: 2024-03-26

## 2023-09-21 RX ORDER — OMEPRAZOLE 40 MG/1
CAPSULE, DELAYED RELEASE ORAL
Qty: 90 CAPSULE | Refills: 3 | Status: SHIPPED | OUTPATIENT
Start: 2023-09-21 | End: 2024-02-01

## 2023-09-21 ASSESSMENT — PATIENT HEALTH QUESTIONNAIRE - PHQ9
SUM OF ALL RESPONSES TO PHQ QUESTIONS 1-9: 18
SUM OF ALL RESPONSES TO PHQ QUESTIONS 1-9: 18
10. IF YOU CHECKED OFF ANY PROBLEMS, HOW DIFFICULT HAVE THESE PROBLEMS MADE IT FOR YOU TO DO YOUR WORK, TAKE CARE OF THINGS AT HOME, OR GET ALONG WITH OTHER PEOPLE: EXTREMELY DIFFICULT
5. POOR APPETITE OR OVEREATING: MORE THAN HALF THE DAYS

## 2023-09-21 ASSESSMENT — ANXIETY QUESTIONNAIRES
GAD7 TOTAL SCORE: 11
2. NOT BEING ABLE TO STOP OR CONTROL WORRYING: NEARLY EVERY DAY
7. FEELING AFRAID AS IF SOMETHING AWFUL MIGHT HAPPEN: SEVERAL DAYS
6. BECOMING EASILY ANNOYED OR IRRITABLE: MORE THAN HALF THE DAYS
5. BEING SO RESTLESS THAT IT IS HARD TO SIT STILL: NOT AT ALL
1. FEELING NERVOUS, ANXIOUS, OR ON EDGE: NOT AT ALL
3. WORRYING TOO MUCH ABOUT DIFFERENT THINGS: NEARLY EVERY DAY
GAD7 TOTAL SCORE: 11
IF YOU CHECKED OFF ANY PROBLEMS ON THIS QUESTIONNAIRE, HOW DIFFICULT HAVE THESE PROBLEMS MADE IT FOR YOU TO DO YOUR WORK, TAKE CARE OF THINGS AT HOME, OR GET ALONG WITH OTHER PEOPLE: VERY DIFFICULT

## 2023-09-21 ASSESSMENT — PAIN SCALES - GENERAL: PAINLEVEL: EXTREME PAIN (9)

## 2023-09-21 NOTE — PATIENT INSTRUCTIONS
Will order an MRI of your neck and low back.    Start Wegovy weekly injections for weight loss.   If you have any side effects, let me know.    Recheck in 8 weeks.    Restart Prozac and Wellbutrin.    Continue chiropractic and home exercise.  Try to stay active and walk frequently.    Updated mammogram ordered.

## 2023-09-21 NOTE — PROGRESS NOTES
Assessment & Plan       ICD-10-CM    1. Depression with anxiety  F41.8 FLUoxetine (PROZAC) 10 MG capsule     buPROPion (WELLBUTRIN XL) 300 MG 24 hr tablet      2. Cervical radiculopathy  M54.12 MR Cervical Spine w/o Contrast      3. Acute midline low back pain with left-sided sciatica  M54.42 MR Lumbar Spine w/o Contrast      4. Paresthesia of foot, bilateral  R20.2 Hemoglobin A1c     MR Lumbar Spine w/o Contrast     Vitamin B12     Hemoglobin A1c     Vitamin B12      5. Gastroesophageal reflux disease with esophagitis without hemorrhage  K21.00 omeprazole (PRILOSEC) 40 MG DR capsule      6. Class 1 obesity due to excess calories without serious comorbidity with body mass index (BMI) of 33.0 to 33.9 in adult  E66.09 TSH with free T4 reflex    Z68.33 Semaglutide-Weight Management (WEGOVY) 0.25 MG/0.5ML pen     Semaglutide-Weight Management (WEGOVY) 0.5 MG/0.5ML pen     Semaglutide-Weight Management (WEGOVY) 1 MG/0.5ML pen     Semaglutide-Weight Management (WEGOVY) 1 MG/0.5ML pen     Semaglutide-Weight Management (WEGOVY) 1.7 MG/0.75ML pen     Semaglutide-Weight Management (WEGOVY) 2.4 MG/0.75ML pen     insulin pen needle (32G X 4 MM) 32G X 4 MM miscellaneous     TSH with free T4 reflex      7. Prediabetes  R73.03 Hemoglobin A1c     Basic metabolic panel  (Ca, Cl, CO2, Creat, Gluc, K, Na, BUN)     Hemoglobin A1c     Basic metabolic panel  (Ca, Cl, CO2, Creat, Gluc, K, Na, BUN)      8. Visit for screening mammogram  Z12.31 MA SCREENING DIGITAL BILAT - Future  (s+30)          1. She tried to get off her medications for anxiety and depression as she thought this was causing weight gain but is only worsened her mood so will restart these again at the same doses. Will recheck in 8 weeks.    2-4. Worsening neck and low back pain with arthritic changes on x-ray per her chiropractor and what I could see personally in the x-ray photos she provided. Given her radicular left arm and leg symptoms with paresthesias, I recommend  an cervical and lumbar MRI for further evaluation. Continue with chiropractic along with home stretching and heat. Depending on MRI results, will consider formal physical therapy versus spine referral versus injections. Will also check labs today.     5. Continue omeprazole.    6. She has gained weight over the past few years and has been trying a low carb, low calorie diet without much benefit. I discussed trying Wegovy and discussed potential side effects. She would like to move forward with this so will titrate as directed if covered by insurance. Will recheck in 8 weeks and she will continue with a healthy, low carb diet and I recommend more routine weight bearing exercise.    7. History of elevated glucose and A1c today is 5.8 indicating prediabetes. The Wegovy and healthier lifestyle will help.    8. Screening mammogram ordered.    She declines influenza vaccine.     A total of 40 minutes spent on reviewing history, completing exam, discussing plan and completing note.     Damaso Anderson PA-C  [facility] VINAY Garsia is a 63 year old, presenting for the following health issues:  Recheck Medication        9/21/2023     5:02 PM   Additional Questions   Roomed by Leslee   Accompanied by Self         9/21/2023     5:02 PM   Patient Reported Additional Medications   Patient reports taking the following new medications none       History of Present Illness       Reason for visit:  Depression,meds refill,Request MRI and a Spine doctor per Chiropactor an Pediatrist.Need help with weiht loss also.Can we run a blood panel again and thyroid test    She eats 0-1 servings of fruits and vegetables daily.She consumes 0 sweetened beverage(s) daily.She exercises with enough effort to increase her heart rate 10 to 19 minutes per day.  She exercises with enough effort to increase her heart rate 3 or less days per week.   She is taking medications regularly.         Depression and Anxiety  Follow-Up  How are you doing with your depression since your last visit? Worsened   How are you doing with your anxiety since your last visit?  No change  Are you having other symptoms that might be associated with depression or anxiety? No  Have you had a significant life event? Health Concerns   Do you have any concerns with your use of alcohol or other drugs? No  Patient stopped taking depression medication about 2 months ago, she thought her weight gain was from the pills, but still gaining weight so she wants to get back on these medications. She denies any thoughts of self harm.      Over the past 3-6 months, she has noticed increased pain in her neck and low back with radiation of pain into the left arm and into the hand with numbness and tingling in this area. She also has noticed pain from her back going into the left hip and down the left leg to the foot, with numbness and tingling from the knee to the foot on this side as well. She also has tingling in her right foot but no shooting right leg pain. She has been following with a chiropractor who completed x-rays of her neck, low back and hips and she was told she has arthritis and likely has some pinched nerves so should undergo an MRI for further evaluation. She denies any loss of bowel/bladder control or saddle anesthesia. She has taken ibuprofen and Tylenol intermittent with minimal benefit. She is trying to do home stretching/exercises. She did see podiatry for her left foot pain but was told the tingling and pain in her feet is likely from her low back.     She would like to discuss a medication to help with weight loss. She has cut back on carbs and calories but still cannot lose weight. She cannot perform much routine exercise due to her pain.      Social History     Tobacco Use    Smoking status: Former     Packs/day: 0.25     Years: 32.00     Pack years: 8.00     Types: Cigarettes     Start date: 5/9/1980     Quit date: 3/4/2012     Years since  "quittin.5    Smokeless tobacco: Never   Vaping Use    Vaping Use: Never used   Substance Use Topics    Alcohol use: No     Comment: socailly    Drug use: No         2022     3:53 PM 2022     1:56 PM 2023     4:41 PM   PHQ   PHQ-9 Total Score 5 4 18   Q9: Thoughts of better off dead/self-harm past 2 weeks Not at all Not at all Not at all         2020     2:19 PM 2020     3:57 PM 2023     5:05 PM   MARIAN-7 SCORE   Total Score 0 2 11         Review of Systems   Constitutional, HEENT, cardiovascular, pulmonary, musculoskeletal, neuro, psych, gi and gu systems are negative, except as otherwise noted.      Objective    /72   Pulse 75   Temp 97.5  F (36.4  C) (Temporal)   Resp 14   Ht 1.6 m (5' 3\")   Wt 86 kg (189 lb 8 oz)   SpO2 93%   BMI 33.57 kg/m    Body mass index is 33.57 kg/m .  Physical Exam   GENERAL: healthy, alert and no distress  EYES: Eyes grossly normal to inspection, PERRL and conjunctivae and sclerae normal  RESP: lungs clear to auscultation - no rales, rhonchi or wheezes  CV: regular rate and rhythm, normal S1 S2, no S3 or S4, no murmur, click or rub, no peripheral edema and peripheral pulses strong  ABDOMEN: soft, nontender, no hepatosplenomegaly, no masses and bowel sounds normal  MS: no gross musculoskeletal defects noted, no edema. Tenderness over the lower cervical spinous processes and upper lumbar spinous processes.   NEURO: Normal strength and tone, mentation intact and speech normal. Cranial nerves II-XII are grossly intact. Positive straight leg raise on the left. Negative Starkey's sign. DTRs are 2+/4 throughout and symmetric. Gait is stable.   PSYCH: mentation appears normal, affect normal/bright      Lab Results   Component Value Date    A1C 5.8 2023                   "

## 2023-09-22 ENCOUNTER — TELEPHONE (OUTPATIENT)
Dept: FAMILY MEDICINE | Facility: OTHER | Age: 63
End: 2023-09-22
Payer: COMMERCIAL

## 2023-09-22 DIAGNOSIS — E66.811 CLASS 1 OBESITY DUE TO EXCESS CALORIES WITHOUT SERIOUS COMORBIDITY WITH BODY MASS INDEX (BMI) OF 33.0 TO 33.9 IN ADULT: Primary | ICD-10-CM

## 2023-09-22 DIAGNOSIS — R73.03 PREDIABETES: ICD-10-CM

## 2023-09-22 DIAGNOSIS — E66.09 CLASS 1 OBESITY DUE TO EXCESS CALORIES WITHOUT SERIOUS COMORBIDITY WITH BODY MASS INDEX (BMI) OF 33.0 TO 33.9 IN ADULT: Primary | ICD-10-CM

## 2023-09-22 LAB — VIT B12 SERPL-MCNC: 2407 PG/ML (ref 232–1245)

## 2023-09-26 NOTE — TELEPHONE ENCOUNTER
PA Initiation    Medication: SEMAGLUTIDE-WEIGHT MANAGEMENT 0.25 MG/0.5ML SC SOAJ  Insurance Company: [employer] - Phone [phone] Fax [phone]  Pharmacy Filling the Rx: [facility] [place] - [address] [place] [address]  Filling Pharmacy Phone: [phone]  Filling Pharmacy Fax: [phone]  Start Date: 9/26/2023

## 2023-09-28 NOTE — TELEPHONE ENCOUNTER
Please call and notify patient that the Wegovy medication for weight loss was denied. She needs at least 2 visits to discuss weight loss in the past 6 months so will refer for a nutritionist visit as this will count for the 2nd visit and then we can try sending the medication again.    Damaso Anderson PA-C

## 2023-09-28 NOTE — TELEPHONE ENCOUNTER
PRIOR AUTHORIZATION DENIED    Medication: SEMAGLUTIDE-WEIGHT MANAGEMENT 0.25 MG/0.5ML SC SOAJ  Insurance Company: [employer] - Phone [phone] Fax [phone]  Denial Date: 9/27/2023  Denial Rational: Patient needs to have additional appointment discussing weight loss    Appeal Information:     Patient Notified: No, clinic must notify

## 2023-09-28 NOTE — TELEPHONE ENCOUNTER
Patient called back and relayed providers note.  Patient wanted mychart message sent with nutrition referral #.  Mychart message sent.

## 2023-09-29 ENCOUNTER — HOSPITAL ENCOUNTER (OUTPATIENT)
Dept: MRI IMAGING | Facility: CLINIC | Age: 63
Discharge: HOME | End: 2023-09-29
Attending: PHYSICIAN ASSISTANT
Payer: COMMERCIAL

## 2023-09-29 DIAGNOSIS — M54.12 CERVICAL RADICULOPATHY: ICD-10-CM

## 2023-09-29 DIAGNOSIS — R20.2 PARESTHESIA OF FOOT, BILATERAL: ICD-10-CM

## 2023-09-29 DIAGNOSIS — M54.42 ACUTE MIDLINE LOW BACK PAIN WITH LEFT-SIDED SCIATICA: ICD-10-CM

## 2023-09-29 PROCEDURE — 72148 MRI LUMBAR SPINE W/O DYE: CPT

## 2023-09-29 PROCEDURE — 72141 MRI NECK SPINE W/O DYE: CPT

## 2023-10-02 ENCOUNTER — TELEPHONE (OUTPATIENT)
Dept: FAMILY MEDICINE | Facility: OTHER | Age: 63
End: 2023-10-02
Payer: COMMERCIAL

## 2023-10-02 DIAGNOSIS — M54.12 CERVICAL RADICULOPATHY: Primary | ICD-10-CM

## 2023-10-02 DIAGNOSIS — M54.42 ACUTE MIDLINE LOW BACK PAIN WITH LEFT-SIDED SCIATICA: ICD-10-CM

## 2023-10-02 NOTE — TELEPHONE ENCOUNTER
----- Message from Damaso Anderson PA-C sent at 10/2/2023  4:25 PM CDT -----  Does she want [facility] or [facility] in [place]? The other message says [place] so this is where I wrote it for.    Leon  ----- Message -----  From: Nika Velasco  Sent: 10/2/2023   9:47 AM CDT  To: Damaso Anderson PA-C    Contacted patient with test results and relayed any advice listed below from the provider.    She would like to proceed with PT. Please place referral for [facility] PT.

## 2023-10-02 NOTE — TELEPHONE ENCOUNTER
Attempted to call patient to see which she prefers, no answer. [facility].

## 2023-10-02 NOTE — TELEPHONE ENCOUNTER
PT referral placed. Please fax to [facility].    Damaso Anderson PA-C

## 2023-10-02 NOTE — TELEPHONE ENCOUNTER
Patient returning call to have referral for PT sent to the following place. Please fax referral.     [facility]  BRI Irwin, MSN, RN, PHN  [place]  October 2, 2023

## 2023-10-03 ENCOUNTER — MYC MEDICAL ADVICE (OUTPATIENT)
Dept: FAMILY MEDICINE | Facility: OTHER | Age: 63
End: 2023-10-03
Payer: COMMERCIAL

## 2023-10-05 DIAGNOSIS — M54.12 CERVICAL RADICULOPATHY: Primary | ICD-10-CM

## 2023-10-05 NOTE — TELEPHONE ENCOUNTER
Patient requesting referral to have injections for pain.     Mara OBREGONN, RN  [facility]

## 2023-10-09 ENCOUNTER — HOSPITAL ENCOUNTER (OUTPATIENT)
Dept: MAMMOGRAPHY | Facility: CLINIC | Age: 63
Discharge: HOME | End: 2023-10-09
Attending: PHYSICIAN ASSISTANT | Admitting: PHYSICIAN ASSISTANT
Payer: COMMERCIAL

## 2023-10-09 DIAGNOSIS — Z12.31 VISIT FOR SCREENING MAMMOGRAM: ICD-10-CM

## 2023-10-09 PROCEDURE — 77067 SCR MAMMO BI INCL CAD: CPT

## 2023-10-17 ENCOUNTER — TRANSFERRED RECORDS (OUTPATIENT)
Dept: HEALTH INFORMATION MANAGEMENT | Facility: CLINIC | Age: 63
End: 2023-10-17
Payer: COMMERCIAL

## 2023-10-23 ENCOUNTER — TELEPHONE (OUTPATIENT)
Dept: FAMILY MEDICINE | Facility: OTHER | Age: 63
End: 2023-10-23

## 2023-10-23 NOTE — TELEPHONE ENCOUNTER
INCOMING FORMS    Sender: Gui LEWIS    Type of Form, letter or note (What is requested?): order    How was the form received?: Fax    How should forms be returned?:  Fax : [phone]    Form placed in JM bin for review/signature if appropriate.

## 2023-11-07 ENCOUNTER — ALLIED HEALTH/NURSE VISIT (OUTPATIENT)
Dept: EDUCATION SERVICES | Facility: CLINIC | Age: 63
End: 2023-11-07
Payer: COMMERCIAL

## 2023-11-07 DIAGNOSIS — E66.811 CLASS 1 OBESITY DUE TO EXCESS CALORIES WITHOUT SERIOUS COMORBIDITY WITH BODY MASS INDEX (BMI) OF 33.0 TO 33.9 IN ADULT: ICD-10-CM

## 2023-11-07 DIAGNOSIS — R73.03 PREDIABETES: ICD-10-CM

## 2023-11-07 DIAGNOSIS — E66.09 CLASS 1 OBESITY DUE TO EXCESS CALORIES WITHOUT SERIOUS COMORBIDITY WITH BODY MASS INDEX (BMI) OF 33.0 TO 33.9 IN ADULT: ICD-10-CM

## 2023-11-07 PROCEDURE — 97802 MEDICAL NUTRITION INDIV IN: CPT

## 2023-11-07 NOTE — PATIENT INSTRUCTIONS
1) Use the My plate to help with meal balance and portion control.  Aim for 45 grams or less per meal of carbohydrates  -Try adding a snack to your afternoon on your way home from work- snacks should include a protein + carbohydrate  Sample 3 Carb Breakfast Choices- Carbohydrate choices found in (  )    cup cooked cereal (1.5)  1 cup blueberries (1)  4 oz skim milk (0.5)  2 tablespoons of nuts (0)   1 whole grain English muffin (2)    cup mixed fruit (1)  1 boiled egg (0) 6 oz light yogurt (1)  1 small orange (1)  1 slice whole grain toast (1)  1 slice low fat cheese (0)    1 small whole grain tortilla (1)  1 cup skim milk (1)     banana (1)  1 Tablespoon peanut butter (0)   8 oz skim milk (1) + 1 packet of instant breakfast mix (2)   2 slices whole grain toast (2)   1 cup skim milk (1)  1 scrambled egg (0) 1 cup cubed sweet potatoes (2)  1 scrambled egg  (0)  Vegetables (0)  1 cup skim milk (1)   1 cup of skim milk (1)  1 medium banana (2)  1-2 tablespoons of peanut butter  (0)   3 Carbohydrate Choice Sample Meal Plans for Lunch or Supper   Carbohydrate choices found in (   )  2/3 cup whole grain spaghetti noodles (2) +    cup low sodium pasta sauce (1)+ 4 oz lean ground beef or turkey (0) +  2 cups lettuce + veggies (0) + 1-2 tsbp salad dressing (0) 2 slices of whole grain bread (2)  3 oz lean turkey (0) + lettuce/tomato (0)  2 tsp carbajal (0)  1 small apple (1) 1 cup of low sodium broth based soup (1) +  2 slices of whole grain bread (2)   2 oz of low fat cheese (0) + 1 tsp butter (0)  Carrots/celery (0) 2 cups lettuce salad (0) +   cup garbanzo beans (1) + +   cup tuna (0) + 2 Tablespoons low-fat vinaigrette salad dressing (0) +   cup grapes (1) + 6 oz light yogurt (1)   1 cup low sodium broth based soup (1) + 6 saltine crackers (1) + 1 small apple (1) + broccoli/cauliflower and low fat dip (0)   3 oz lean steak (0) + 1 small 3 oz baked potato (1) + 1 tsp low fat sour cream (0) + 1 cup green beans (0) + 1 small  dinner roll (1)   1 cup berries (1) + 1 tbsp light whipped cream (0)  2 cups lettuce salad (0)   3 oz grilled chicken (0)  1-2 Tsbp light Caesar dressing (0)  + 1 Tbps grated cheese (0)    cup grapes (1)  5 Triscuit crackers (1)  8 oz skim milk (1)   3 oz chicken (0)  1 cup sweet potato (2)  1 cup asparagus (0)  1 small apple (1)  16 oz sparkling water (unsweetened)   1 hamburger faye (0) on hamburger bun (2) + 1/2 order of small szymanski (1) + + 1 garden salad (0) with 1 package of vinaigrette (0) + 1 cup baby carrots + 1/2 cup green beans-cooked (0)  2 slices whole grain bread (2)+ 2 oz lean ham (0) + leaf lettuce/tomato/onion (0)  2 teaspoons carbajal (0)  1 small pear (1)  1 cup raw veggie sticks (0) 2 slices thin crust pizza (2) + 2 cups lettuce salad (0) + 10 cherry tomatoes (0) + 2 Tbsp light salad dressing (0) + 1 small peach (1) 2 small whole grain tortillas (2) +   cup fat free refried beans (1) + 3 oz shredded lean beef (0) + 2 Tsbp salsa (0)+ lettuce/tomato (0) + 1 Tbsp light sour cream (0)   1 cup low sodium chicken noodle soup (1) + 2 slices whole grain bread (2) + 2 oz lean turkey (0) + 1 oz low fat cheese (0) + 1-2 teaspoons of butter or margarine  3 oz turkey (0) + 1 cup mashed potatoes (2) + 1 tsp butter (0) + 1 cup green beans (0) +   cup unsweetened apple sauce (1) 3 oz chicken + 2 medium pierogis (2) + 1/3 cup cabbage (0) +   cup grapes (1) +   cup green beans (0) I cup of beef stroganoff (0) + 2/3 cup egg noodles (2) + 1 cup broccoli (0) + 1 cup carrots (0) + 1 cup of watermelon (1)     Thank you,   Tracie Pineda, KANDIN, LD, [facility]  Outpatient Diabetes Education  Adult Diabetes Education Triage [phone]  Diabetes Scheduling [phone]

## 2023-11-07 NOTE — LETTER
"    11/7/2023         RE: Sun De La Garza  [address] [address] [address]        Dear Colleague,    Thank you for referring your patient, Sun De La Garza, to the [facility] DIABETES EDUCATION [place]. Please see a copy of my visit note below.    Medical Nutrition Therapy  Visit Type:Initial assessment and intervention    Sun De La Garza presents today for MNT and education related to prediabetes and weight management.   She is accompanied by self.     ASSESSMENT:   Patient comments/concerns relating to nutrition: weight gain, limited mobility due to pinched nerve and bone spurs in neck since June.  Also has arthritis in her hips and is doing PT for that.  Used to exercise more frequently including- yoga, walking videos and dancing to the oldies, videos on You tube mostly.  Plans to try acupuncture and would like PT for her neck.   Has followed programs like Weight Watchers in the past without much success.  Did lose weight on keto diet, but found the \"high fat\" diet difficult to follow long-term.   In the past six months has been trying to decrease sugar intake.  Uses Stevia, organic honey and  real maple syrup for sweetness as needed.  Stopped drinking soda, with exception of Coke zero occasionally and  will also drink crystal light.  Says she struggles with meal prep- likes to bake at home and will make zucchini or squash muffins.  Says late afternoon/evening is when she struggles with hunger and diet choices.  Unable to drink water at her work station.  Day is mainly sitting or standing at work.    NUTRITION HISTORY:  Breakfast: (4:30a) Coffee with Stevia (1 tsp) and real maple syrup (1T) OR Zero sugar creamer and a Intrapace Fig Bar OR Pure Protein Bars  Lunch: (12:30p)[employer] greek yogurt + fruit and 3T walnuts  Dinner: (5-6pm) Burger on bun with cheese with olives and 10 tater tots dipped in honey mustard OR  Snacks: morning snack (9:30a)- 2 HB eggs + 3/4 C fruit- berries or " applesauce evening snack: (8pm)sweets- chocolate or was having 2 dove dark chocolate OR popcorn  Beverages: Coffee, crystal light or Coke zero    Misses meals? No  Eats out:  1-2 meals/per week - mostly on weekends- [employer], [employer] or [employer]    Previous diet education:  Yes -diverticulitis     Food allergies/intolerances: NKFA        EXERCISE: minimal exercise    SOCIO/ECONOMIC:   Lives with: self and spouse    MEDICATIONS:  Current Outpatient Medications   Medication     aspirin 81 MG tablet     buPROPion (WELLBUTRIN XL) 300 MG 24 hr tablet     Calcium Carbonate-Vitamin D (CALCIUM 500 + D PO)     FLUoxetine (PROZAC) 10 MG capsule     insulin pen needle (32G X 4 MM) 32G X 4 MM miscellaneous     omeprazole (PRILOSEC) 40 MG DR capsule     Semaglutide-Weight Management (WEGOVY) 0.25 MG/0.5ML pen     Semaglutide-Weight Management (WEGOVY) 0.5 MG/0.5ML pen     Semaglutide-Weight Management (WEGOVY) 1 MG/0.5ML pen     Semaglutide-Weight Management (WEGOVY) 1 MG/0.5ML pen     Semaglutide-Weight Management (WEGOVY) 1.7 MG/0.75ML pen     Semaglutide-Weight Management (WEGOVY) 2.4 MG/0.75ML pen     No current facility-administered medications for this visit.       LABS:  Lab Results   Component Value Date     09/21/2023     06/12/2021      Lab Results   Component Value Date    POTASSIUM 3.8 09/21/2023    POTASSIUM 3.6 04/29/2022    POTASSIUM 3.9 06/12/2021     Lab Results   Component Value Date    CHLORIDE 107 09/21/2023    CHLORIDE 110 04/29/2022    CHLORIDE 109 06/12/2021     Lab Results   Component Value Date    DANELLE 9.1 09/21/2023    DANELLE 8.6 06/12/2021     Lab Results   Component Value Date    CO2 26 09/21/2023    CO2 30 04/29/2022    CO2 27 06/12/2021     Lab Results   Component Value Date    BUN 16.7 09/21/2023    BUN 13 04/29/2022    BUN 15 06/12/2021     Lab Results   Component Value Date    CR 0.84 09/21/2023    CR 0.82 06/12/2021     Lab Results   Component Value Date     09/21/2023     " 04/29/2022    GLC 93 06/12/2021     Lab Results   Component Value Date     01/28/2022    LDL 96 03/30/2018     HDL Cholesterol   Date Value Ref Range Status   03/30/2018 51 >49 mg/dL Final     Direct Measure HDL   Date Value Ref Range Status   01/28/2022 57 >=50 mg/dL Final   ]  GFR Estimate   Date Value Ref Range Status   09/21/2023 79 >60 mL/min/1.73m2 Final   06/12/2021 79 >60 mL/min/[1.73_m2] Final     Comment:     Non  GFR Calc  Starting 12/18/2018, serum creatinine based estimated GFR (eGFR) will be   calculated using the Chronic Kidney Disease Epidemiology Collaboration   (CKD-EPI) equation.       Lab Results   Component Value Date    CR 0.84 09/21/2023    CR 0.82 06/12/2021     No results found for: \"MICROALBUMIN\"    ANTHROPOMETRICS:  Vitals: There were no vitals taken for this visit.  There is no height or weight on file to calculate BMI.      Wt Readings from Last 5 Encounters:   09/21/23 86 kg (189 lb 8 oz)   09/12/23 85.3 kg (188 lb)   01/20/23 85 kg (187 lb 6.4 oz)   07/06/22 78.9 kg (174 lb)   05/06/22 76.2 kg (168 lb)       Weight Change: stable     ESTIMATED KCAL REQUIREMENTS:  1800 kcal per day    NUTRITION DIAGNOSIS: Food- and nutrition-related knowledge deficit related to protein-calorie intake ratio as evidenced by wt gain    NUTRITION INTERVENTION:  Nutrition Prescription: Carbohydrate Intake: 45 grams/meal and 15-20 grams/snacks  Education given to support: general nutrition guidelines, weight reduction, consistent meals, carb counting, artificial sweeteners, exercise, fiber, behavior modification, and portion control  Education Materials Provided: My Plate Planner/Choose My Plate, Label Reading, 100 Calorie Snacks and carbohydrate counting  Motivational Interviewing    PATIENT'S BEHAVIOR CHANGE GOALS:   See Patient Instructions for patient stated behavior change goals. AVS was printed and given to patient at today's appointment.    MONITOR / EVALUATE:  RD will " monitor/evaluate:  Food and nutrition knowledge / skills  Pertinent Labs  Progress toward meeting stated nutrition-related goals  Readiness to change nutrition-related behaviors  Weight change    FOLLOW-UP:  Follow-up appointment scheduled on January 17th, 3pm- in person     Tracie Pineda RDN, INOCENCIO, [facility]  Outpatient Diabetes Education  Adult Diabetes Education Triage [phone]    Time spent in minutes: 60 min  Encounter: Individual

## 2023-11-07 NOTE — PROGRESS NOTES
"Medical Nutrition Therapy  Visit Type:Initial assessment and intervention    Sun De La Garza presents today for MNT and education related to prediabetes and weight management.   She is accompanied by self.     ASSESSMENT:   Patient comments/concerns relating to nutrition: weight gain, limited mobility due to pinched nerve and bone spurs in neck since June.  Also has arthritis in her hips and is doing PT for that.  Used to exercise more frequently including- yoga, walking videos and dancing to the oldies, videos on You tube mostly.  Plans to try acupuncture and would like PT for her neck.   Has followed programs like Weight Watchers in the past without much success.  Did lose weight on keto diet, but found the \"high fat\" diet difficult to follow long-term.   In the past six months has been trying to decrease sugar intake.  Uses Stevia, organic honey and  real maple syrup for sweetness as needed.  Stopped drinking soda, with exception of Coke zero occasionally and  will also drink crystal light.  Says she struggles with meal prep- likes to bake at home and will make zucchini or squash muffins.  Says late afternoon/evening is when she struggles with hunger and diet choices.  Unable to drink water at her work station.  Day is mainly sitting or standing at work.    NUTRITION HISTORY:  Breakfast: (4:30a) Coffee with Stevia (1 tsp) and real maple syrup (1T) OR Zero sugar creamer and a amiando Fig Bar OR Pure Protein Bars  Lunch: (12:30p)[employer] greek yogurt + fruit and 3T walnuts  Dinner: (5-6pm) Burger on bun with cheese with olives and 10 tater tots dipped in honey mustard OR  Snacks: morning snack (9:30a)- 2 HB eggs + 3/4 C fruit- berries or applesauce evening snack: (8pm)sweets- chocolate or was having 2 dove dark chocolate OR popcorn  Beverages: Coffee, crystal light or Coke zero    Misses meals? No  Eats out:  1-2 meals/per week - mostly on weekends- [employer], [employer] or [employer]    Previous " diet education:  Yes -diverticulitis     Food allergies/intolerances: NKFA        EXERCISE: minimal exercise    SOCIO/ECONOMIC:   Lives with: self and spouse    MEDICATIONS:  Current Outpatient Medications   Medication    aspirin 81 MG tablet    buPROPion (WELLBUTRIN XL) 300 MG 24 hr tablet    Calcium Carbonate-Vitamin D (CALCIUM 500 + D PO)    FLUoxetine (PROZAC) 10 MG capsule    insulin pen needle (32G X 4 MM) 32G X 4 MM miscellaneous    omeprazole (PRILOSEC) 40 MG DR capsule    Semaglutide-Weight Management (WEGOVY) 0.25 MG/0.5ML pen    Semaglutide-Weight Management (WEGOVY) 0.5 MG/0.5ML pen    Semaglutide-Weight Management (WEGOVY) 1 MG/0.5ML pen    Semaglutide-Weight Management (WEGOVY) 1 MG/0.5ML pen    Semaglutide-Weight Management (WEGOVY) 1.7 MG/0.75ML pen    Semaglutide-Weight Management (WEGOVY) 2.4 MG/0.75ML pen     No current facility-administered medications for this visit.       LABS:  Lab Results   Component Value Date     09/21/2023     06/12/2021      Lab Results   Component Value Date    POTASSIUM 3.8 09/21/2023    POTASSIUM 3.6 04/29/2022    POTASSIUM 3.9 06/12/2021     Lab Results   Component Value Date    CHLORIDE 107 09/21/2023    CHLORIDE 110 04/29/2022    CHLORIDE 109 06/12/2021     Lab Results   Component Value Date    DANELLE 9.1 09/21/2023    DANELLE 8.6 06/12/2021     Lab Results   Component Value Date    CO2 26 09/21/2023    CO2 30 04/29/2022    CO2 27 06/12/2021     Lab Results   Component Value Date    BUN 16.7 09/21/2023    BUN 13 04/29/2022    BUN 15 06/12/2021     Lab Results   Component Value Date    CR 0.84 09/21/2023    CR 0.82 06/12/2021     Lab Results   Component Value Date     09/21/2023     04/29/2022    GLC 93 06/12/2021     Lab Results   Component Value Date     01/28/2022    LDL 96 03/30/2018     HDL Cholesterol   Date Value Ref Range Status   03/30/2018 51 >49 mg/dL Final     Direct Measure HDL   Date Value Ref Range Status   01/28/2022 57 >=50  "mg/dL Final   ]  GFR Estimate   Date Value Ref Range Status   09/21/2023 79 >60 mL/min/1.73m2 Final   06/12/2021 79 >60 mL/min/[1.73_m2] Final     Comment:     Non  GFR Calc  Starting 12/18/2018, serum creatinine based estimated GFR (eGFR) will be   calculated using the Chronic Kidney Disease Epidemiology Collaboration   (CKD-EPI) equation.       Lab Results   Component Value Date    CR 0.84 09/21/2023    CR 0.82 06/12/2021     No results found for: \"MICROALBUMIN\"    ANTHROPOMETRICS:  Vitals: There were no vitals taken for this visit.  There is no height or weight on file to calculate BMI.      Wt Readings from Last 5 Encounters:   09/21/23 86 kg (189 lb 8 oz)   09/12/23 85.3 kg (188 lb)   01/20/23 85 kg (187 lb 6.4 oz)   07/06/22 78.9 kg (174 lb)   05/06/22 76.2 kg (168 lb)       Weight Change: stable     ESTIMATED KCAL REQUIREMENTS:  1800 kcal per day    NUTRITION DIAGNOSIS: Food- and nutrition-related knowledge deficit related to protein-calorie intake ratio as evidenced by wt gain    NUTRITION INTERVENTION:  Nutrition Prescription: Carbohydrate Intake: 45 grams/meal and 15-20 grams/snacks  Education given to support: general nutrition guidelines, weight reduction, consistent meals, carb counting, artificial sweeteners, exercise, fiber, behavior modification, and portion control  Education Materials Provided: My Plate Planner/Choose My Plate, Label Reading, 100 Calorie Snacks and carbohydrate counting  Motivational Interviewing    PATIENT'S BEHAVIOR CHANGE GOALS:   See Patient Instructions for patient stated behavior change goals. AVS was printed and given to patient at today's appointment.    MONITOR / EVALUATE:  RD will monitor/evaluate:  Food and nutrition knowledge / skills  Pertinent Labs  Progress toward meeting stated nutrition-related goals  Readiness to change nutrition-related behaviors  Weight change    FOLLOW-UP:  Follow-up appointment scheduled on January 17th, 3pm- in person "     Tracie Pineda RDN, LD, [facility]  Outpatient Diabetes Education  Adult Diabetes Education Triage [phone]    Time spent in minutes: 60 min  Encounter: Individual

## 2023-11-09 ENCOUNTER — TELEPHONE (OUTPATIENT)
Dept: FAMILY MEDICINE | Facility: OTHER | Age: 63
End: 2023-11-09
Payer: COMMERCIAL

## 2023-11-09 NOTE — TELEPHONE ENCOUNTER
PA needed for:Wegovy 0.25  Insurance: [employer]  Insur phone: [phone]  Patient ID: [id number]  Please let us know when PA is granted/denied. Thank you!  Deanna Dykes, Pharmacy [facility], [facility] [place] [phone]

## 2023-11-09 NOTE — LETTER
November 13, 2023      Sun De La Garza  [address] [address] [address]        To Whom It May Concern,     I am the primary care provider for Sun De La Garza. She carries a diagnosis of obesity and prediabetes and has been recommended to start weekly Wegovy (semaglutide) injections starting at 0.25 mg and increasing monthly until at her goal dose 2.4 mg. She has had three separate appointments for education regarding obesity, lifestyle, diet and exercise. The first was with myself on 9/21/23 and the second two were with a diabetic educator/dietician on 10/16/23 and 11/7/23. It is my professional medical opinion that she would strongly benefit from Wegovy treatment to assist with weight loss. Thank you.           Sincerely,        Damaso Anderson PA-C

## 2023-11-13 NOTE — TELEPHONE ENCOUNTER
PA for this medication was submitted and denied in encounter dated 09/22/2023. If you would like to appeal please provide a letter of medical necessity with clinical reason and route the original encounter back to the PA team. Thank you.

## 2023-11-17 NOTE — TELEPHONE ENCOUNTER
Medication Appeal Initiation    Medication: SEMAGLUTIDE-WEIGHT MANAGEMENT 0.25 MG/0.5ML SC SOAJ  Appeal Start Date:  11/17/2023  Insurance Company: [employer] -   Insurance Phone: Phone [phone]    Insurance Fax: Fax [phone]  Comments:       Sent to plan via fax

## 2023-11-17 NOTE — TELEPHONE ENCOUNTER
Plan requires appeal form to be signed for us to move forward with appeal. Reaching out to care team to assist.

## 2023-11-21 NOTE — TELEPHONE ENCOUNTER
Forms received. Left message for pt to return our call. Pt needing to sign x2 on appeal paperwork. Forms in TC callback folder.

## 2023-11-28 NOTE — TELEPHONE ENCOUNTER
Pt has been left 2 messages. She needs to sign 2 different papers. Forms are in TC area awaiting callback.

## 2023-11-28 NOTE — TELEPHONE ENCOUNTER
Attempted to call patient with the following message below. Left a voicemail for the patient to call the clinic back.    Wendy Wiggins MA

## 2023-11-30 ENCOUNTER — TRANSFERRED RECORDS (OUTPATIENT)
Dept: HEALTH INFORMATION MANAGEMENT | Facility: CLINIC | Age: 63
End: 2023-11-30
Payer: COMMERCIAL

## 2023-12-05 NOTE — TELEPHONE ENCOUNTER
Pt calling stating insurance would like a call from provider [phone] or to send another letter of appeal    RN called number provided - [employer] will be faxing us forms for provider to fill out     Shannon Mendoza RN

## 2023-12-12 ENCOUNTER — TELEPHONE (OUTPATIENT)
Dept: FAMILY MEDICINE | Facility: OTHER | Age: 63
End: 2023-12-12
Payer: COMMERCIAL

## 2023-12-12 DIAGNOSIS — E66.811 CLASS 1 OBESITY DUE TO EXCESS CALORIES WITHOUT SERIOUS COMORBIDITY WITH BODY MASS INDEX (BMI) OF 33.0 TO 33.9 IN ADULT: Primary | ICD-10-CM

## 2023-12-12 DIAGNOSIS — E66.09 CLASS 1 OBESITY DUE TO EXCESS CALORIES WITHOUT SERIOUS COMORBIDITY WITH BODY MASS INDEX (BMI) OF 33.0 TO 33.9 IN ADULT: Primary | ICD-10-CM

## 2023-12-12 RX ORDER — PHENTERMINE HYDROCHLORIDE 15 MG/1
15 CAPSULE ORAL EVERY MORNING
Qty: 30 CAPSULE | Refills: 0 | Status: SHIPPED | OUTPATIENT
Start: 2023-12-12 | End: 2024-01-02

## 2023-12-12 NOTE — TELEPHONE ENCOUNTER
Let's try low dose phentermine 15 mg daily. Can occasionally cause a racing heart but typically well tolerated. If not covered, should be cheap even without insurance. Should follow-up in clinic in 1 month.     Damaso Anderson PA-C

## 2023-12-12 NOTE — TELEPHONE ENCOUNTER
We have attempted to reach patient since 11/21 via phone and [employer] message. Left another message today. Will keep forms until Friday 12/15 and then discard if pt does not reach out.

## 2023-12-12 NOTE — TELEPHONE ENCOUNTER
Patient is calling to see if there is an alternative medication that can be prescribed as the Wegovy will not be covered for her per her insurance.    eNreyda Cheema XRO/

## 2023-12-13 NOTE — TELEPHONE ENCOUNTER
MEDICATION APPEAL DENIED    Medication: SEMAGLUTIDE-WEIGHT MANAGEMENT 0.25 MG/0.5ML SC SOAJ  Insurance Company: [employer] - Phone [phone] Fax [phone]   Denial Date: 12/13/2023  Denial Reason(s): Appeal was not denied, just marked denied for PA admin reasons. Patient never filled out forms so appeal was never completed and provider decided to switch therapy.   Second Level Appeal Information: N/A  Patient Notified: N/A  Central Prior Authorization Team ONLY: Second level appeals will be managed by the clinic staff and provider. Please contact the [employer] Prior Authorization Team if additional information about the denial is needed.

## 2023-12-21 ENCOUNTER — MYC MEDICAL ADVICE (OUTPATIENT)
Dept: FAMILY MEDICINE | Facility: OTHER | Age: 63
End: 2023-12-21
Payer: COMMERCIAL

## 2024-01-02 DIAGNOSIS — E66.811 CLASS 1 OBESITY DUE TO EXCESS CALORIES WITHOUT SERIOUS COMORBIDITY WITH BODY MASS INDEX (BMI) OF 33.0 TO 33.9 IN ADULT: ICD-10-CM

## 2024-01-02 DIAGNOSIS — E66.09 CLASS 1 OBESITY DUE TO EXCESS CALORIES WITHOUT SERIOUS COMORBIDITY WITH BODY MASS INDEX (BMI) OF 33.0 TO 33.9 IN ADULT: ICD-10-CM

## 2024-01-02 NOTE — TELEPHONE ENCOUNTER
Reason for Call:  Appointment Request    Patient requesting this type of appt: Chronic Diease Management/Medication/Follow-Up    Requested provider: Damaso Anderson    Reason patient unable to be scheduled: Not within requested timeframe    When does patient want to be seen/preferred time: 3-7 days    Comments: Pt is need of a refill of her weight loss pill.    Could we send this information to you in [employer] or would you prefer to receive a phone call?:   Patient would prefer a phone call   Okay to leave a detailed message?: Yes at Cell number on file:    Telephone Information:   Mobile [phone]       Call taken on 1/2/2024 at 3:20 PM by Ce Duong

## 2024-01-03 RX ORDER — PHENTERMINE HYDROCHLORIDE 15 MG/1
15 CAPSULE ORAL EVERY MORNING
Qty: 30 CAPSULE | Refills: 0 | Status: SHIPPED | OUTPATIENT
Start: 2024-01-03 | End: 2024-02-01

## 2024-01-09 ENCOUNTER — TELEPHONE (OUTPATIENT)
Dept: FAMILY MEDICINE | Facility: OTHER | Age: 64
End: 2024-01-09
Payer: COMMERCIAL

## 2024-01-09 NOTE — TELEPHONE ENCOUNTER
Patient called regarding refill on phentermine prescription. Per patients records it looked like the refill had been sent to the pharmacy on 1/3/24 but patient said she did not receive a call or anything for the pharmacy.     Called [facility] for patient and spoke with staff that said they did have the prescription but due to insurance they would not be able to fill it until tomorrow. Informed patient of that information.    Nydia Cheema RN on 1/9/2024 at 3:25 PM

## 2024-02-01 ENCOUNTER — OFFICE VISIT (OUTPATIENT)
Dept: FAMILY MEDICINE | Facility: OTHER | Age: 64
End: 2024-02-01
Payer: COMMERCIAL

## 2024-02-01 VITALS
DIASTOLIC BLOOD PRESSURE: 76 MMHG | SYSTOLIC BLOOD PRESSURE: 116 MMHG | HEIGHT: 63 IN | BODY MASS INDEX: 33.58 KG/M2 | RESPIRATION RATE: 16 BRPM | TEMPERATURE: 98 F | OXYGEN SATURATION: 91 % | WEIGHT: 189.5 LBS | HEART RATE: 85 BPM

## 2024-02-01 DIAGNOSIS — E66.811 CLASS 1 OBESITY DUE TO EXCESS CALORIES WITHOUT SERIOUS COMORBIDITY WITH BODY MASS INDEX (BMI) OF 33.0 TO 33.9 IN ADULT: Primary | ICD-10-CM

## 2024-02-01 DIAGNOSIS — M54.42 ACUTE MIDLINE LOW BACK PAIN WITH LEFT-SIDED SCIATICA: ICD-10-CM

## 2024-02-01 DIAGNOSIS — E66.09 CLASS 1 OBESITY DUE TO EXCESS CALORIES WITHOUT SERIOUS COMORBIDITY WITH BODY MASS INDEX (BMI) OF 33.0 TO 33.9 IN ADULT: Primary | ICD-10-CM

## 2024-02-01 DIAGNOSIS — M54.12 CERVICAL RADICULOPATHY: ICD-10-CM

## 2024-02-01 DIAGNOSIS — K21.00 GASTROESOPHAGEAL REFLUX DISEASE WITH ESOPHAGITIS WITHOUT HEMORRHAGE: ICD-10-CM

## 2024-02-01 PROCEDURE — 99214 OFFICE O/P EST MOD 30 MIN: CPT | Performed by: PHYSICIAN ASSISTANT

## 2024-02-01 RX ORDER — PHENTERMINE HYDROCHLORIDE 30 MG/1
30 CAPSULE ORAL EVERY MORNING
Qty: 30 CAPSULE | Refills: 1 | Status: SHIPPED | OUTPATIENT
Start: 2024-02-01 | End: 2024-08-07

## 2024-02-01 RX ORDER — OMEPRAZOLE 40 MG/1
CAPSULE, DELAYED RELEASE ORAL
Qty: 90 CAPSULE | Refills: 3 | Status: SHIPPED | OUTPATIENT
Start: 2024-02-01

## 2024-02-01 RX ORDER — PHENTERMINE HYDROCHLORIDE 15 MG/1
15 CAPSULE ORAL EVERY MORNING
Qty: 30 CAPSULE | Refills: 0 | Status: CANCELLED | OUTPATIENT
Start: 2024-02-01

## 2024-02-01 NOTE — PATIENT INSTRUCTIONS
Will increase phentermine to 30 mg.    Continue omeprazole.    Follow-up in 2 months for a recheck.

## 2024-02-01 NOTE — PROGRESS NOTES
"  Assessment & Plan       ICD-10-CM    1. Class 1 obesity due to excess calories without serious comorbidity with body mass index (BMI) of 33.0 to 33.9 in adult  E66.09 phentermine (ADIPEX-P) 30 MG capsule    Z68.33       2. Gastroesophageal reflux disease with esophagitis without hemorrhage  K21.00 omeprazole (PRILOSEC) 40 MG DR capsule      3. Cervical radiculopathy  M54.12       4. Acute midline low back pain with left-sided sciatica  M54.42           1. Will increase phentermine to 30 mg daily for better results. She has lost 3 pounds so far and denies side effects. She will combine this with a healthier diet and routine exercise. Will recheck in 2 months in the clinic.    2. Continue omeprazole at current dose.    3-4. Neck and low back pain have overall been improved with physical therapy and home exercises. Continue with current conservative treatments.    Follow-up in 2 months.    She declines vaccines today.         Shalom Garsia is a 63 year old, presenting for the following health issues:  Recheck Medication        2/1/2024     3:43 PM   Additional Questions   Roomed by Obdulia PIZANO   Accompanied by self     HPI       Medication Followup of Phentermine  Taking Medication as prescribed: yes  Side Effects:  None  Medication Helping Symptoms:  not yet - \"that is the problem\" when she first took it, it was suppressing her appetite but now still feels hungry.    She has lost 3 pounds since starting the medication but feels she needs a higher dose as her appetite has come back since being on the current 15 mg dose. She denies any unwanted side effects. She is trying to eat a healthier diet and stay active.    Her neck and low back pain have been improving. She did physical therapy in [place] for awhile and now dose home exercises. Her work is also very accommodating which has been helpful.       Review of Systems  Constitutional, HEENT, cardiovascular, pulmonary, musculoskeletal, neuro, gi and gu systems are " "negative, except as otherwise noted.      Objective    /76   Pulse 85   Temp 98  F (36.7  C) (Temporal)   Resp 16   Ht 1.6 m (5' 3\")   Wt 86 kg (189 lb 8 oz)   SpO2 91%   BMI 33.57 kg/m    Body mass index is 33.57 kg/m .  Physical Exam   GENERAL: alert and no distress  RESP: lungs clear to auscultation - no rales, rhonchi or wheezes  CV: regular rate and rhythm, normal S1 S2, no S3 or S4, no murmur, click or rub, no peripheral edema  NEURO: Normal strength and tone, mentation intact and speech normal. Gait is stable.   PSYCH: mentation appears normal, affect normal/bright          Signed Electronically by: Damaso Andesron PA-C    "

## 2024-03-26 DIAGNOSIS — F41.8 DEPRESSION WITH ANXIETY: ICD-10-CM

## 2024-03-26 RX ORDER — BUPROPION HYDROCHLORIDE 300 MG/1
TABLET ORAL
Qty: 90 TABLET | Refills: 1 | Status: SHIPPED | OUTPATIENT
Start: 2024-03-26 | End: 2024-08-05

## 2024-06-16 ENCOUNTER — HEALTH MAINTENANCE LETTER (OUTPATIENT)
Age: 64
End: 2024-06-16

## 2024-08-04 DIAGNOSIS — F41.8 DEPRESSION WITH ANXIETY: ICD-10-CM

## 2024-08-05 RX ORDER — BUPROPION HYDROCHLORIDE 300 MG/1
TABLET ORAL
Qty: 90 TABLET | Refills: 0 | Status: SHIPPED | OUTPATIENT
Start: 2024-08-05 | End: 2024-08-07

## 2024-08-06 ASSESSMENT — ANXIETY QUESTIONNAIRES
8. IF YOU CHECKED OFF ANY PROBLEMS, HOW DIFFICULT HAVE THESE MADE IT FOR YOU TO DO YOUR WORK, TAKE CARE OF THINGS AT HOME, OR GET ALONG WITH OTHER PEOPLE?: NOT DIFFICULT AT ALL
1. FEELING NERVOUS, ANXIOUS, OR ON EDGE: NOT AT ALL
3. WORRYING TOO MUCH ABOUT DIFFERENT THINGS: NOT AT ALL
IF YOU CHECKED OFF ANY PROBLEMS ON THIS QUESTIONNAIRE, HOW DIFFICULT HAVE THESE PROBLEMS MADE IT FOR YOU TO DO YOUR WORK, TAKE CARE OF THINGS AT HOME, OR GET ALONG WITH OTHER PEOPLE: NOT DIFFICULT AT ALL
4. TROUBLE RELAXING: NOT AT ALL
GAD7 TOTAL SCORE: 0
GAD7 TOTAL SCORE: 0
7. FEELING AFRAID AS IF SOMETHING AWFUL MIGHT HAPPEN: NOT AT ALL
2. NOT BEING ABLE TO STOP OR CONTROL WORRYING: NOT AT ALL
7. FEELING AFRAID AS IF SOMETHING AWFUL MIGHT HAPPEN: NOT AT ALL
6. BECOMING EASILY ANNOYED OR IRRITABLE: NOT AT ALL
5. BEING SO RESTLESS THAT IT IS HARD TO SIT STILL: NOT AT ALL

## 2024-08-06 ASSESSMENT — PATIENT HEALTH QUESTIONNAIRE - PHQ9: SUM OF ALL RESPONSES TO PHQ QUESTIONS 1-9: 4

## 2024-08-07 ENCOUNTER — VIRTUAL VISIT (OUTPATIENT)
Dept: FAMILY MEDICINE | Facility: OTHER | Age: 64
End: 2024-08-07
Payer: COMMERCIAL

## 2024-08-07 DIAGNOSIS — K21.00 GASTROESOPHAGEAL REFLUX DISEASE WITH ESOPHAGITIS WITHOUT HEMORRHAGE: ICD-10-CM

## 2024-08-07 DIAGNOSIS — F41.8 DEPRESSION WITH ANXIETY: Primary | ICD-10-CM

## 2024-08-07 DIAGNOSIS — E66.811 CLASS 1 OBESITY DUE TO EXCESS CALORIES WITHOUT SERIOUS COMORBIDITY WITH BODY MASS INDEX (BMI) OF 33.0 TO 33.9 IN ADULT: ICD-10-CM

## 2024-08-07 DIAGNOSIS — E66.09 CLASS 1 OBESITY DUE TO EXCESS CALORIES WITHOUT SERIOUS COMORBIDITY WITH BODY MASS INDEX (BMI) OF 33.0 TO 33.9 IN ADULT: ICD-10-CM

## 2024-08-07 PROCEDURE — 99441 PR PHYSICIAN TELEPHONE EVALUATION 5-10 MIN: CPT | Mod: 93 | Performed by: PHYSICIAN ASSISTANT

## 2024-08-07 RX ORDER — BUPROPION HYDROCHLORIDE 300 MG/1
TABLET ORAL
Qty: 90 TABLET | Refills: 3 | Status: SHIPPED | OUTPATIENT
Start: 2024-08-07

## 2024-08-07 RX ORDER — FAMOTIDINE 40 MG/1
40 TABLET, FILM COATED ORAL
Qty: 90 TABLET | Refills: 3 | Status: SHIPPED | OUTPATIENT
Start: 2024-08-07

## 2024-08-07 RX ORDER — FAMOTIDINE 40 MG/1
40 TABLET, FILM COATED ORAL DAILY
Qty: 90 TABLET | Refills: 3 | Status: SHIPPED | OUTPATIENT
Start: 2024-08-07 | End: 2024-08-07

## 2024-08-07 ASSESSMENT — ANXIETY QUESTIONNAIRES: GAD7 TOTAL SCORE: 0

## 2024-08-07 ASSESSMENT — PATIENT HEALTH QUESTIONNAIRE - PHQ9
10. IF YOU CHECKED OFF ANY PROBLEMS, HOW DIFFICULT HAVE THESE PROBLEMS MADE IT FOR YOU TO DO YOUR WORK, TAKE CARE OF THINGS AT HOME, OR GET ALONG WITH OTHER PEOPLE: NOT DIFFICULT AT ALL
SUM OF ALL RESPONSES TO PHQ QUESTIONS 1-9: 4

## 2024-08-07 NOTE — PROGRESS NOTES
Sun is a 63 year old who is being evaluated via a billable telephone visit.    What phone number would you like to be contacted at? [phone]   How would you like to obtain your AVS? Shahab  Originating Location (pt. Location): Home    Distant Location (provider location):  On-site    Assessment & Plan       ICD-10-CM    1. Depression with anxiety  F41.8 buPROPion (WELLBUTRIN XL) 300 MG 24 hr tablet      2. Class 1 obesity due to excess calories without serious comorbidity with body mass index (BMI) of 33.0 to 33.9 in adult  E66.09     Z68.33       3. Gastroesophageal reflux disease with esophagitis without hemorrhage  K21.00 famotidine (PEPCID) 40 MG tablet          1. Continue Wellbutrin as this continues to work well for her mood. Will refill for 1 year.    2. She has lost weight with a healthier diet and some over the count supplements like fish oil and glucosamine, which have also helped her overall joint pain and neck pain. She has been off the phentermine for quite a few months.     3. Continue omeprazole in the mornings and will add Pepcid 40 mg with dinner to help with her evening symptoms. Avoid exacerbating foods. If not improving, will order an updated upper endoscopy.    She has an upcoming annual physical and PAP with Dr. Stahl.    Follow-up annually.      Subjective   Sun is a 63 year old, presenting for the following health issues:   Follow Up and Recheck Medication        8/7/2024     3:22 PM   Additional Questions   Roomed by Daija HAMILTON   Accompanied by Significant Other Paul         8/7/2024     3:22 PM   Patient Reported Additional Medications   Patient reports taking the following new medications glucosamine, fish oil     HPI     She has overall been feeling great. She has lost 10 pounds and is on fish oil and glucosamine. This has helped with her weight and her overall pain/inflammation. She is eating a healthier diet and is staying active. Her mental health has been great and  she remains on Wellbutrin.    She is experiencing more heartburn in the evenings after supper so she is wondering if she can increase her omeprazole. She remains on 40 mg daily in the mornings.       Review of Systems  Constitutional, HEENT, cardiovascular, pulmonary, psych, gi and gu systems are negative, except as otherwise noted.      Objective    Vitals - Patient Reported  Pain Score: No Pain (0)        Physical Exam   General: Alert and no distress //Respiratory: No audible wheeze, cough, or shortness of breath // Psychiatric:  Appropriate affect, tone, and pace of words          Phone call duration: 10 minutes  Signed Electronically by: Damaso Anderson PA-C

## 2024-08-07 NOTE — PATIENT INSTRUCTIONS
Will add Pepcid nightly with dinner.    If reflux is not improving, please contact the clinic.     Continue omeprazole and Wellbutrin.    Follow-up annually.

## 2024-08-07 NOTE — PROGRESS NOTES
Sun is a 63 year old who is being evaluated via a billable video visit.    How would you like to obtain your AVS? MyChart  If the video visit is dropped, the invitation should be resent by: Text to cell phone: [phone]  Will anyone else be joining your video visit? No  {If patient encounters technical issues they should call [phone] :072235}    {PROVIDER CHARTING PREFERENCE:190804}    Subjective   Sun is a 63 year old, presenting for the following health issues:   Follow Up and Recheck Medication        8/7/2024     3:22 PM   Additional Questions   Roomed by Daija HAMILTON   Accompanied by Significant Other Paul         8/7/2024     3:22 PM   Patient Reported Additional Medications   Patient reports taking the following new medications glucosamine, fish oil     History of Present Illness       Mental Health Follow-up:  Patient presents to follow-up on Depression.Patient's depression since last visit has been:  Good  The patient is not having other symptoms associated with depression.      Any significant life events: No  Patient is not feeling anxious or having panic attacks.  Patient has no concerns about alcohol or drug use.    She eats 2-3 servings of fruits and vegetables daily.She consumes 1 sweetened beverage(s) daily.She exercises with enough effort to increase her heart rate 20 to 29 minutes per day.  She exercises with enough effort to increase her heart rate 5 days per week.   She is taking medications regularly.       Can patient take 2 omeprazole instead of 1?    {SUPERLIST (Optional):020624}  {additonal problems for provider to add (Optional):255707}    {ROS Picklists (Optional):984781}      Objective    Vitals - Patient Reported  Pain Score: No Pain (0)      Vitals:  No vitals were obtained today due to virtual visit.    Physical Exam   {video visit exam brief selected:117625}    {Diagnostic Test Results (Optional):287123}      Video-Visit Details    Type of service:  Video Visit  "  Originating Location (pt. Location): {video visit patient location:397990::\"Home\"}  {PROVIDER LOCATION On-site should be selected for visits conducted from your clinic location or adjoining [facility] hospital, academic office, or other nearby [facility] building. Off-site should be selected for all other provider locations, including home:930579}  Distant Location (provider location):  {virtual location provider:179280}  Platform used for Video Visit: {Virtual Visit Platforms:606839::\"[employer]\"}  Signed Electronically by: Damaso Anderson PA-C  {Email feedback regarding this note to [email or web address]   :490968}  "

## 2024-08-08 SDOH — HEALTH STABILITY: PHYSICAL HEALTH: ON AVERAGE, HOW MANY MINUTES DO YOU ENGAGE IN EXERCISE AT THIS LEVEL?: 20 MIN

## 2024-08-08 SDOH — HEALTH STABILITY: PHYSICAL HEALTH: ON AVERAGE, HOW MANY DAYS PER WEEK DO YOU ENGAGE IN MODERATE TO STRENUOUS EXERCISE (LIKE A BRISK WALK)?: 5 DAYS

## 2024-08-08 ASSESSMENT — SOCIAL DETERMINANTS OF HEALTH (SDOH): HOW OFTEN DO YOU GET TOGETHER WITH FRIENDS OR RELATIVES?: ONCE A WEEK

## 2024-08-13 ENCOUNTER — OFFICE VISIT (OUTPATIENT)
Dept: FAMILY MEDICINE | Facility: OTHER | Age: 64
End: 2024-08-13
Payer: COMMERCIAL

## 2024-08-13 VITALS
TEMPERATURE: 97.4 F | DIASTOLIC BLOOD PRESSURE: 78 MMHG | RESPIRATION RATE: 16 BRPM | WEIGHT: 175 LBS | SYSTOLIC BLOOD PRESSURE: 118 MMHG | OXYGEN SATURATION: 97 % | HEIGHT: 63 IN | BODY MASS INDEX: 31.01 KG/M2 | HEART RATE: 78 BPM

## 2024-08-13 DIAGNOSIS — Z00.00 ROUTINE GENERAL MEDICAL EXAMINATION AT A HEALTH CARE FACILITY: Primary | ICD-10-CM

## 2024-08-13 DIAGNOSIS — R79.89 HIGH SERUM VITAMIN B12: ICD-10-CM

## 2024-08-13 DIAGNOSIS — Z12.4 CERVICAL CANCER SCREENING: ICD-10-CM

## 2024-08-13 DIAGNOSIS — R73.03 PREDIABETES: ICD-10-CM

## 2024-08-13 DIAGNOSIS — E55.9 VITAMIN D DEFICIENCY: ICD-10-CM

## 2024-08-13 LAB
ANION GAP SERPL CALCULATED.3IONS-SCNC: 8 MMOL/L (ref 7–15)
BUN SERPL-MCNC: 16.1 MG/DL (ref 8–23)
CALCIUM SERPL-MCNC: 9.4 MG/DL (ref 8.8–10.4)
CHLORIDE SERPL-SCNC: 106 MMOL/L (ref 98–107)
CHOLEST SERPL-MCNC: 192 MG/DL
CREAT SERPL-MCNC: 0.95 MG/DL (ref 0.51–0.95)
EGFRCR SERPLBLD CKD-EPI 2021: 67 ML/MIN/1.73M2
FASTING STATUS PATIENT QL REPORTED: YES
FASTING STATUS PATIENT QL REPORTED: YES
GLUCOSE SERPL-MCNC: 95 MG/DL (ref 70–99)
HBA1C MFR BLD: 5.8 % (ref 0–5.6)
HCO3 SERPL-SCNC: 29 MMOL/L (ref 22–29)
HDLC SERPL-MCNC: 61 MG/DL
LDLC SERPL CALC-MCNC: 109 MG/DL
NONHDLC SERPL-MCNC: 131 MG/DL
POTASSIUM SERPL-SCNC: 4.3 MMOL/L (ref 3.4–5.3)
SODIUM SERPL-SCNC: 143 MMOL/L (ref 135–145)
TRIGL SERPL-MCNC: 112 MG/DL
TSH SERPL DL<=0.005 MIU/L-ACNC: 1.63 UIU/ML (ref 0.3–4.2)
VIT B12 SERPL-MCNC: 401 PG/ML (ref 232–1245)
VIT D+METAB SERPL-MCNC: 71 NG/ML (ref 20–50)

## 2024-08-13 PROCEDURE — 80061 LIPID PANEL: CPT | Performed by: FAMILY MEDICINE

## 2024-08-13 PROCEDURE — 99396 PREV VISIT EST AGE 40-64: CPT | Performed by: FAMILY MEDICINE

## 2024-08-13 PROCEDURE — 82306 VITAMIN D 25 HYDROXY: CPT | Performed by: FAMILY MEDICINE

## 2024-08-13 PROCEDURE — 36415 COLL VENOUS BLD VENIPUNCTURE: CPT | Performed by: FAMILY MEDICINE

## 2024-08-13 PROCEDURE — G0145 SCR C/V CYTO,THINLAYER,RESCR: HCPCS | Performed by: FAMILY MEDICINE

## 2024-08-13 PROCEDURE — 87624 HPV HI-RISK TYP POOLED RSLT: CPT | Performed by: FAMILY MEDICINE

## 2024-08-13 PROCEDURE — 80048 BASIC METABOLIC PNL TOTAL CA: CPT | Performed by: FAMILY MEDICINE

## 2024-08-13 PROCEDURE — 83036 HEMOGLOBIN GLYCOSYLATED A1C: CPT | Performed by: FAMILY MEDICINE

## 2024-08-13 PROCEDURE — 84443 ASSAY THYROID STIM HORMONE: CPT | Performed by: FAMILY MEDICINE

## 2024-08-13 PROCEDURE — 82607 VITAMIN B-12: CPT | Performed by: FAMILY MEDICINE

## 2024-08-13 ASSESSMENT — PATIENT HEALTH QUESTIONNAIRE - PHQ9
SUM OF ALL RESPONSES TO PHQ QUESTIONS 1-9: 0
10. IF YOU CHECKED OFF ANY PROBLEMS, HOW DIFFICULT HAVE THESE PROBLEMS MADE IT FOR YOU TO DO YOUR WORK, TAKE CARE OF THINGS AT HOME, OR GET ALONG WITH OTHER PEOPLE: NOT DIFFICULT AT ALL
SUM OF ALL RESPONSES TO PHQ QUESTIONS 1-9: 0

## 2024-08-13 ASSESSMENT — PAIN SCALES - GENERAL: PAINLEVEL: NO PAIN (0)

## 2024-08-13 NOTE — PROGRESS NOTES
"Preventive Care Visit  [facility]  Destiny Stahl MD, Family Medicine  Aug 13, 2024      Assessment & Plan     Routine general medical examination at a health care facility    Cervical cancer screening  - Pap Screen with HPV - Recommended Age 30 - 65 Years    Vitamin D deficiency  Has a history of vitamin D deficiency.  Will recheck    - Vitamin D Deficiency; Future    High serum vitamin B12  Her B12 levels were quite elevated last year.  She stopped her supplement.  Will recheck.     - Vitamin B12; Future    Prediabetes   She had mildly elevated A1c last year.  Will recheck    - Lipid panel reflex to direct LDL Fasting; Future  - Hemoglobin A1c; Future  - Basic metabolic panel  (Ca, Cl, CO2, Creat, Gluc, K, Na, BUN); Future  - TSH with free T4 reflex; Future    BMI  Estimated body mass index is 31 kg/m  as calculated from the following:    Height as of this encounter: 1.6 m (5' 3\").    Weight as of this encounter: 79.4 kg (175 lb).   Weight management plan: Discussed healthy diet and exercise guidelines    Counseling  Appropriate preventive services were addressed with this patient via screening, questionnaire, or discussion as appropriate for fall prevention, nutrition, physical activity, Tobacco-use cessation, weight loss and cognition.  Checklist reviewing preventive services available has been given to the patient.  Reviewed patient's diet, addressing concerns and/or questions.   She is at risk for psychosocial distress and has been provided with information to reduce risk.       Shalom Garsia is a 64 year old, presenting for the following:  Physical        8/13/2024     9:12 AM   Additional Questions   Roomed by michelle delgado        Health Care Directive  Patient does not have a Health Care Directive or Living Will: Discussed advance care planning with patient; information given to patient to review.    HPI        8/8/2024   General Health   How would you rate your overall " physical health? Good   Feel stress (tense, anxious, or unable to sleep) Only a little      (!) STRESS CONCERN      2024   Nutrition   Three or more servings of calcium each day? (!) I DON'T KNOW   Diet: Regular (no restrictions)   How many servings of fruit and vegetables per day? (!) 2-3   How many sweetened beverages each day? 0-1            2024   Exercise   Days per week of moderate/strenous exercise 5 days   Average minutes spent exercising at this level 20 min            2024   Social Factors   Frequency of gathering with friends or relatives Once a week   Worry food won't last until get money to buy more No   Food not last or not have enough money for food? No   Do you have housing? (Housing is defined as stable permanent housing and does not include staying ouside in a car, in a tent, in an abandoned building, in an overnight shelter, or couch-surfing.) Yes   Are you worried about losing your housing? No   Lack of transportation? No   Unable to get utilities (heat,electricity)? No            2024   Fall Risk   Fallen 2 or more times in the past year? No   Trouble with walking or balance? No             2024   Dental   Dentist two times every year? (!) DECLINE            2024   TB Screening   Were you born outside of the [place]? No          Today's PHQ-9 Score:       2024     9:05 AM   PHQ-9 SCORE   PHQ-9 Total Score MyChart 0   PHQ-9 Total Score 0         2024   Substance Use   Alcohol more than 3/day or more than 7/wk No   Do you use any other substances recreationally? No        Social History     Tobacco Use    Smoking status: Former     Current packs/day: 0.00     Average packs/day: 0.3 packs/day for 32.0 years (8.0 ttl pk-yrs)     Types: Cigarettes     Start date: 1980     Quit date: 3/4/2012     Years since quittin.4     Passive exposure: Never    Smokeless tobacco: Never   Vaping Use    Vaping status: Some Days   Substance Use Topics    Alcohol use: No      "Comment: socailly    Drug use: No           10/9/2023   LAST FHS-7 RESULTS   1st degree relative breast or ovarian cancer No   Any relative bilateral breast cancer No   Any male have breast cancer No   Any ONE woman have BOTH breast AND ovarian cancer No   Any woman with breast cancer before 50yrs No   2 or more relatives with breast AND/OR ovarian cancer No   2 or more relatives with breast AND/OR bowel cancer No           Mammogram Screening - Mammogram every 1-2 years updated in Health Maintenance based on mutual decision making        8/8/2024   STI Screening   New sexual partner(s) since last STI/HIV test? No        History of abnormal Pap smear: No - age 30- 64 PAP with HPV every 5 years recommended        Latest Ref Rng & Units 5/1/2019     4:15 PM 5/1/2019     4:10 PM 3/4/2016     3:22 PM   PAP / HPV   PAP (Historical)   NIL     HPV 16 DNA NEG^Negative Negative   Negative    HPV 18 DNA NEG^Negative Negative   Negative    Other HR HPV NEG^Negative Negative   Negative      ASCVD Risk   The 10-year ASCVD risk score (Jacky DIXON, et al., 2019) is: 3.3%    Values used to calculate the score:      Age: 64 years      Sex: Female      Is Non- : No      Diabetic: No      Tobacco smoker: No      Systolic Blood Pressure: 118 mmHg      Is BP treated: No      HDL Cholesterol: 57 mg/dL      Total Cholesterol: 193 mg/dL           Reviewed and updated as needed this visit by Provider   Tobacco  Allergies  Meds  Problems  Med Hx  Surg Hx  Fam Hx                 Objective    Exam  /78 (BP Location: Left arm, Patient Position: Sitting, Cuff Size: Adult Regular)   Pulse 78   Temp 97.4  F (36.3  C) (Temporal)   Resp 16   Ht 1.6 m (5' 3\")   Wt 79.4 kg (175 lb)   SpO2 97%   BMI 31.00 kg/m     Estimated body mass index is 31 kg/m  as calculated from the following:    Height as of this encounter: 1.6 m (5' 3\").    Weight as of this encounter: 79.4 kg (175 lb).    Physical " Exam  Constitutional:       General: She is not in acute distress.     Appearance: She is well-developed.   HENT:      Right Ear: Tympanic membrane and external ear normal.      Left Ear: Tympanic membrane and external ear normal.      Nose: Nose normal.      Mouth/Throat:      Pharynx: No oropharyngeal exudate.   Eyes:      General:         Right eye: No discharge.         Left eye: No discharge.      Conjunctiva/sclera: Conjunctivae normal.   Neck:      Thyroid: No thyromegaly.   Cardiovascular:      Rate and Rhythm: Normal rate and regular rhythm.      Heart sounds: Normal heart sounds, S1 normal and S2 normal. No murmur heard.  Pulmonary:      Effort: Pulmonary effort is normal. No respiratory distress.      Breath sounds: Normal breath sounds. No wheezing or rales.   Chest:   Breasts:     Right: No mass, nipple discharge or tenderness.      Left: No mass, nipple discharge or tenderness.   Abdominal:      General: Bowel sounds are normal.      Palpations: Abdomen is soft. There is no mass.      Tenderness: There is no abdominal tenderness.   Genitourinary:     Cervix: No cervical motion tenderness or discharge.   Musculoskeletal:         General: Normal range of motion.      Cervical back: Neck supple.   Lymphadenopathy:      Cervical: No cervical adenopathy.   Skin:     General: Skin is warm and dry.      Findings: No rash.   Neurological:      Mental Status: She is alert and oriented to person, place, and time.      Motor: No abnormal muscle tone.      Deep Tendon Reflexes: Reflexes are normal and symmetric.   Psychiatric:         Mood and Affect: Mood normal.         Behavior: Behavior normal.         Thought Content: Thought content normal.         Judgment: Judgment normal.       Signed Electronically by: Destiny Stahl MD    Answers submitted by the patient for this visit:  Patient Health Questionnaire (Submitted on 8/13/2024)  If you checked off any problems, how difficult have these problems made  it for you to do your work, take care of things at home, or get along with other people?: Not difficult at all  PHQ9 TOTAL SCORE: 0

## 2024-08-13 NOTE — PATIENT INSTRUCTIONS
Patient Education   Preventive Care Advice   This is general advice given by our system to help you stay healthy. However, your care team may have specific advice just for you. Please talk to your care team about your preventive care needs.  Nutrition  Eat 5 or more servings of fruits and vegetables each day.  Try wheat bread, brown rice and whole grain pasta (instead of white bread, rice, and pasta).  Get enough calcium and vitamin D. Check the label on foods and aim for 100% of the RDA (recommended daily allowance).  Lifestyle  Exercise at least 150 minutes each week  (30 minutes a day, 5 days a week).  Do muscle strengthening activities 2 days a week. These help control your weight and prevent disease.  No smoking.  Wear sunscreen to prevent skin cancer.  Have a dental exam and cleaning every 6 months.  Yearly exams  See your health care team every year to talk about:  Any changes in your health.  Any medicines your care team has prescribed.  Preventive care, family planning, and ways to prevent chronic diseases.  Shots (vaccines)   HPV shots (up to age 26), if you've never had them before.  Hepatitis B shots (up to age 59), if you've never had them before.  COVID-19 shot: Get this shot when it's due.  Flu shot: Get a flu shot every year.  Tetanus shot: Get a tetanus shot every 10 years.  Pneumococcal, hepatitis A, and RSV shots: Ask your care team if you need these based on your risk.  Shingles shot (for age 50 and up)  General health tests  Diabetes screening:  Starting at age 35, Get screened for diabetes at least every 3 years.  If you are younger than age 35, ask your care team if you should be screened for diabetes.  Cholesterol test: At age 39, start having a cholesterol test every 5 years, or more often if advised.  Bone density scan (DEXA): At age 50, ask your care team if you should have this scan for osteoporosis (brittle bones).  Hepatitis C: Get tested at least once in your life.  STIs (sexually  transmitted infections)  Before age 24: Ask your care team if you should be screened for STIs.  After age 24: Get screened for STIs if you're at risk. You are at risk for STIs (including HIV) if:  You are sexually active with more than one person.  You don't use condoms every time.  You or a partner was diagnosed with a sexually transmitted infection.  If you are at risk for HIV, ask about PrEP medicine to prevent HIV.  Get tested for HIV at least once in your life, whether you are at risk for HIV or not.  Cancer screening tests  Cervical cancer screening: If you have a cervix, begin getting regular cervical cancer screening tests starting at age 21.  Breast cancer scan (mammogram): If you've ever had breasts, begin having regular mammograms starting at age 40. This is a scan to check for breast cancer.  Colon cancer screening: It is important to start screening for colon cancer at age 45.  Have a colonoscopy test every 10 years (or more often if you're at risk) Or, ask your provider about stool tests like a FIT test every year or Cologuard test every 3 years.  To learn more about your testing options, visit:   .  For help making a decision, visit:   https://bit.ly/mq61626.  Prostate cancer screening test: If you have a prostate, ask your care team if a prostate cancer screening test (PSA) at age 55 is right for you.  Lung cancer screening: If you are a current or former smoker ages 50 to 80, ask your care team if ongoing lung cancer screenings are right for you.  For informational purposes only. Not to replace the advice of your health care provider. Copyright   2023 [employer]. All rights reserved. Clinically reviewed by the [facility]. [employer] [id number] - REV 01/24.

## 2024-08-14 LAB
HPV HR 12 DNA CVX QL NAA+PROBE: NEGATIVE
HPV16 DNA CVX QL NAA+PROBE: NEGATIVE
HPV18 DNA CVX QL NAA+PROBE: NEGATIVE
HUMAN PAPILLOMA VIRUS FINAL DIAGNOSIS: NORMAL

## 2024-08-16 ENCOUNTER — TRANSFERRED RECORDS (OUTPATIENT)
Dept: HEALTH INFORMATION MANAGEMENT | Facility: CLINIC | Age: 64
End: 2024-08-16
Payer: COMMERCIAL

## 2024-08-16 LAB
BKR LAB AP GYN ADEQUACY: NORMAL
BKR LAB AP GYN INTERPRETATION: NORMAL
BKR LAB AP PREVIOUS ABNORMAL: NORMAL
PATH REPORT.COMMENTS IMP SPEC: NORMAL
PATH REPORT.COMMENTS IMP SPEC: NORMAL
PATH REPORT.RELEVANT HX SPEC: NORMAL

## 2024-09-09 ENCOUNTER — PATIENT OUTREACH (OUTPATIENT)
Dept: CARE COORDINATION | Facility: CLINIC | Age: 64
End: 2024-09-09
Payer: COMMERCIAL

## 2024-10-07 ENCOUNTER — PATIENT OUTREACH (OUTPATIENT)
Dept: CARE COORDINATION | Facility: CLINIC | Age: 64
End: 2024-10-07
Payer: COMMERCIAL

## 2024-12-29 ENCOUNTER — HEALTH MAINTENANCE LETTER (OUTPATIENT)
Age: 64
End: 2024-12-29

## 2025-03-11 ENCOUNTER — OFFICE VISIT (OUTPATIENT)
Dept: FAMILY MEDICINE | Facility: OTHER | Age: 65
End: 2025-03-11
Payer: COMMERCIAL

## 2025-03-11 VITALS
DIASTOLIC BLOOD PRESSURE: 80 MMHG | RESPIRATION RATE: 16 BRPM | OXYGEN SATURATION: 95 % | WEIGHT: 177 LBS | HEART RATE: 61 BPM | SYSTOLIC BLOOD PRESSURE: 120 MMHG | HEIGHT: 63 IN | TEMPERATURE: 97.2 F | BODY MASS INDEX: 31.36 KG/M2

## 2025-03-11 DIAGNOSIS — F41.8 DEPRESSION WITH ANXIETY: Primary | ICD-10-CM

## 2025-03-11 DIAGNOSIS — R13.10 DYSPHAGIA, UNSPECIFIED TYPE: ICD-10-CM

## 2025-03-11 DIAGNOSIS — K21.00 GASTROESOPHAGEAL REFLUX DISEASE WITH ESOPHAGITIS WITHOUT HEMORRHAGE: ICD-10-CM

## 2025-03-11 DIAGNOSIS — G47.30 SLEEP APNEA, UNSPECIFIED TYPE: ICD-10-CM

## 2025-03-11 DIAGNOSIS — Z12.31 VISIT FOR SCREENING MAMMOGRAM: ICD-10-CM

## 2025-03-11 PROCEDURE — 99214 OFFICE O/P EST MOD 30 MIN: CPT | Performed by: FAMILY MEDICINE

## 2025-03-11 RX ORDER — OMEPRAZOLE 40 MG/1
CAPSULE, DELAYED RELEASE ORAL
Qty: 90 CAPSULE | Refills: 3 | Status: SHIPPED | OUTPATIENT
Start: 2025-03-11

## 2025-03-11 RX ORDER — BUPROPION HYDROCHLORIDE 300 MG/1
TABLET ORAL
Qty: 90 TABLET | Refills: 3 | Status: SHIPPED | OUTPATIENT
Start: 2025-03-11

## 2025-03-11 RX ORDER — FAMOTIDINE 40 MG/1
40 TABLET, FILM COATED ORAL
Qty: 90 TABLET | Refills: 3 | Status: SHIPPED | OUTPATIENT
Start: 2025-03-11

## 2025-03-11 ASSESSMENT — PATIENT HEALTH QUESTIONNAIRE - PHQ9: SUM OF ALL RESPONSES TO PHQ QUESTIONS 1-9: 1

## 2025-03-11 NOTE — PROGRESS NOTES
Assessment & Plan     Depression with anxiety  Doing well on bupropion.  Refills given.    - buPROPion (WELLBUTRIN XL) 300 MG 24 hr tablet; TAKE 1 TABLET IN THE MORNING    Sleep apnea, unspecified type  She does not feel her CPAP is as effective.  Will refer back to sleep medicine    - Adult Sleep Eval & Management  Referral; Future    Dysphagia, unspecified type  Patient has known GERD and currently on omeprazole and famotidine but has recently been noticing some dysphagia.  Recommend upper endoscopy.    - Adult GI  Referral - Procedure Only; Future    Gastroesophageal reflux disease with esophagitis without hemorrhage  Omeprazole and famotidine refilled    - omeprazole (PRILOSEC) 40 MG DR capsule; TAKE 1 CAPSULE BY MOUTH  DAILY 30-60 MINUTES BEFORE  A MEAL.  - famotidine (PEPCID) 40 MG tablet; Take 1 tablet (40 mg) by mouth daily (with dinner).    Visit for screening mammogram  - MA Screening Bilateral w/ [place]; Future    Subjective   Sun is a 64 year old, presenting for the following health issues:  Depression, Sleep Problem, and Gastrointestinal Problem (Feel like something is stuck in the middle of chest, not daily)      3/11/2025     9:51 AM   Additional Questions   Roomed by prema     Via the Health Maintenance questionnaire, the patient has reported the following services have been completed -Mammogram: JOY [facility] 2024-01-01, this information has not been sent to the abstraction team.  History of Present Illness       Reason for visit:  Depression Med refill and a sleep study referral, also random chest pains   She is taking medications regularly.          Depression   How are you doing with your depression since your last visit? Doing really good, did go to [place], now our sun is out!  Are you having other symptoms that might be associated with depression? No  Have you had a significant life event?  No   Are you feeling anxious or having panic attacks?   No  Do you have  any concerns with your use of alcohol or other drugs? No    Social History     Tobacco Use    Smoking status: Former     Current packs/day: 0.00     Average packs/day: 0.3 packs/day for 32.0 years (8.0 ttl pk-yrs)     Types: Cigarettes     Start date: 1980     Quit date: 3/4/2012     Years since quittin.0     Passive exposure: Never    Smokeless tobacco: Never   Vaping Use    Vaping status: Some Days   Substance Use Topics    Alcohol use: No     Comment: socailly    Drug use: No         2023     4:41 PM 2024     5:01 PM 2024     9:05 AM   PHQ   PHQ-9 Total Score 18 4 0   Q9: Thoughts of better off dead/self-harm past 2 weeks Not at all Not at all Not at all         2020     3:57 PM 2023     5:05 PM 2024     5:02 PM   MARIAN-7 SCORE   Total Score   0 (minimal anxiety)   Total Score 2 11 0         2024     5:02 PM   MARIAN-7    1. Feeling nervous, anxious, or on edge 0   2. Not being able to stop or control worrying 0   3. Worrying too much about different things 0   4. Trouble relaxing 0   5. Being so restless that it is hard to sit still 0   6. Becoming easily annoyed or irritable 0   7. Feeling afraid, as if something awful might happen 0   MARIAN-7 Total Score 0   If you checked any problems, how difficult have they made it for you to do your work, take care of things at home, or get along with other people? Not difficult at all       Suicide Assessment Five-step Evaluation and Treatment (SAFE-T)    She feels that her mood is doing really well.  She definitely notices an improvement with the son.  She does not want to consider a dose decrease of her bupropion.  She does have    Gastroesophageal reflux disease and is using omeprazole and famotidine.  When she was down in [place] she was having some spicy sausage with jalapenos and felt that it got caught in her low chest.  She was very uncomfortable and left the restaurant.  She states since then she has had other couple episodes  "where she has not been able to related to food but states it feels like vitamins gets stuck into her throat.    She has obstructive sleep apnea and uses CPAP.  However over the last several months she feels that she is waking up gasping for air and her snoring is getting worse.  She also is not feeling as refreshed in the morning.    Doing a breast exam and noticed a lump on her left outer breast.  However when she went back to reexamine it she could not find it anymore.  She denies any nipple discharge or skin changes.        Objective    /80 (BP Location: Right arm, Patient Position: Sitting, Cuff Size: Adult Regular)   Pulse 61   Temp 97.2  F (36.2  C) (Temporal)   Resp 16   Ht 1.6 m (5' 3\")   Wt 80.3 kg (177 lb)   SpO2 95%   BMI 31.35 kg/m    Body mass index is 31.35 kg/m .  Physical Exam   Gen: no apparent distress  NECK: no adenopathy, no asymmetry, no masses  Breasts are symmetric.  No dominant, discrete, fixed  or suspicious masses are noted.  Mild symmetric fibrocystic densities are noted in both upper outer quadrants. No skin or nipple changes or axillary nodes.    Chest: clear to auscultation without wheeze, rale or rhonchi  Cor: regular rate and rhythm without murmur  ABDOMEN: soft, mild epigastric tenderness without rebound or guarding, no masses and bowel sounds normal  Ext: warm and dry without edema  Psych: Alert and oriented times 3; coherent speech, normal   rate and volume, able to articulate logical thoughts, able   to abstract reason, no tangential thoughts, no hallucinations   or delusions  Her affect is neutral        Signed Electronically by: Destiny Stahl MD    "

## 2025-03-12 ENCOUNTER — PATIENT OUTREACH (OUTPATIENT)
Dept: CARE COORDINATION | Facility: CLINIC | Age: 65
End: 2025-03-12
Payer: COMMERCIAL

## 2025-03-18 ENCOUNTER — HOSPITAL ENCOUNTER (OUTPATIENT)
Dept: MAMMOGRAPHY | Facility: CLINIC | Age: 65
Discharge: HOME | End: 2025-03-18
Attending: FAMILY MEDICINE | Admitting: FAMILY MEDICINE
Payer: COMMERCIAL

## 2025-03-18 DIAGNOSIS — Z12.31 VISIT FOR SCREENING MAMMOGRAM: ICD-10-CM

## 2025-03-18 PROCEDURE — 77067 SCR MAMMO BI INCL CAD: CPT

## 2025-03-18 PROCEDURE — 77063 BREAST TOMOSYNTHESIS BI: CPT

## 2025-04-10 ENCOUNTER — OFFICE VISIT (OUTPATIENT)
Dept: FAMILY MEDICINE | Facility: OTHER | Age: 65
End: 2025-04-10
Payer: COMMERCIAL

## 2025-04-10 ENCOUNTER — ANCILLARY PROCEDURE (OUTPATIENT)
Dept: GENERAL RADIOLOGY | Facility: OTHER | Age: 65
End: 2025-04-10
Attending: PHYSICIAN ASSISTANT
Payer: COMMERCIAL

## 2025-04-10 VITALS
SYSTOLIC BLOOD PRESSURE: 118 MMHG | HEIGHT: 63 IN | BODY MASS INDEX: 30.12 KG/M2 | OXYGEN SATURATION: 98 % | TEMPERATURE: 98.1 F | WEIGHT: 170 LBS | HEART RATE: 77 BPM | DIASTOLIC BLOOD PRESSURE: 80 MMHG | RESPIRATION RATE: 24 BRPM

## 2025-04-10 DIAGNOSIS — R05.8 OTHER COUGH: ICD-10-CM

## 2025-04-10 DIAGNOSIS — C44.90 SKIN CANCER: ICD-10-CM

## 2025-04-10 DIAGNOSIS — J02.9 SORE THROAT: ICD-10-CM

## 2025-04-10 DIAGNOSIS — R09.89 CHEST CONGESTION: ICD-10-CM

## 2025-04-10 DIAGNOSIS — J02.9 SORE THROAT: Primary | ICD-10-CM

## 2025-04-10 LAB
BASOPHILS # BLD AUTO: 0.1 10E3/UL (ref 0–0.2)
BASOPHILS NFR BLD AUTO: 1 %
DEPRECATED S PYO AG THROAT QL EIA: NEGATIVE
EOSINOPHIL # BLD AUTO: 0.3 10E3/UL (ref 0–0.7)
EOSINOPHIL NFR BLD AUTO: 3 %
ERYTHROCYTE [DISTWIDTH] IN BLOOD BY AUTOMATED COUNT: 12.4 % (ref 10–15)
FLUAV RNA SPEC QL NAA+PROBE: NEGATIVE
FLUBV RNA RESP QL NAA+PROBE: NEGATIVE
HCT VFR BLD AUTO: 42.2 % (ref 35–47)
HGB BLD-MCNC: 13.9 G/DL (ref 11.7–15.7)
IMM GRANULOCYTES # BLD: 0 10E3/UL
IMM GRANULOCYTES NFR BLD: 0 %
LYMPHOCYTES # BLD AUTO: 3 10E3/UL (ref 0.8–5.3)
LYMPHOCYTES NFR BLD AUTO: 36 %
MCH RBC QN AUTO: 29.1 PG (ref 26.5–33)
MCHC RBC AUTO-ENTMCNC: 32.9 G/DL (ref 31.5–36.5)
MCV RBC AUTO: 89 FL (ref 78–100)
MONOCYTES # BLD AUTO: 0.7 10E3/UL (ref 0–1.3)
MONOCYTES NFR BLD AUTO: 8 %
NEUTROPHILS # BLD AUTO: 4.3 10E3/UL (ref 1.6–8.3)
NEUTROPHILS NFR BLD AUTO: 52 %
PLATELET # BLD AUTO: 272 10E3/UL (ref 150–450)
RBC # BLD AUTO: 4.77 10E6/UL (ref 3.8–5.2)
RSV RNA SPEC NAA+PROBE: NEGATIVE
S PYO DNA THROAT QL NAA+PROBE: NOT DETECTED
SARS-COV-2 RNA RESP QL NAA+PROBE: NEGATIVE
WBC # BLD AUTO: 8.4 10E3/UL (ref 4–11)

## 2025-04-10 RX ORDER — METHYLPREDNISOLONE 4 MG/1
TABLET ORAL
Qty: 21 TABLET | Refills: 0 | Status: SHIPPED | OUTPATIENT
Start: 2025-04-10

## 2025-04-10 RX ORDER — BENZONATATE 200 MG/1
200 CAPSULE ORAL 3 TIMES DAILY PRN
Qty: 30 CAPSULE | Refills: 0 | Status: SHIPPED | OUTPATIENT
Start: 2025-04-10

## 2025-04-10 ASSESSMENT — ENCOUNTER SYMPTOMS
SORE THROAT: 1
COUGH: 1

## 2025-04-10 NOTE — PROGRESS NOTES
"  Assessment & Plan     Sore throat  Other cough  Chest congestion  From the related lab work so far this appears to be viral in nature.  WIll follow-up based on results.  I do have a question of possible pulmonary nodule on the right.  From review of chest x-ray from 2019 this appears to be fairly stable.  It may be nothing more than the projection of related bronchus.  - CBC with platelets and differential; Future  - XR Chest 2 Views; Future  - CBC with platelets and differential  - methylPREDNISolone (MEDROL DOSEPAK) 4 MG tablet therapy pack; Follow Package Directions  - benzonatate (TESSALON) 200 MG capsule; Take 1 capsule (200 mg) by mouth 3 times daily as needed.    Skin cancer - reported right forearm / chest lesion?  Question diagnosis.  We do not have full records from her April 1 visit with [facility] dermatology.  Records requested.    BMI  Estimated body mass index is 30.11 kg/m  as calculated from the following:    Height as of this encounter: 1.6 m (5' 3\").    Weight as of this encounter: 77.1 kg (170 lb).   Weight management plan: Patient was referred to their PCP to discuss a diet and exercise plan.    Shalom Garsia is a 64 year old, presenting for the following health issues:  Pharyngitis and Cough      4/10/2025     9:52 AM   Additional Questions   Roomed by Obdulia PIZANO   Accompanied by self     Pharyngitis   Associated symptoms include cough.   Cough  Associated symptoms include sore throat.   History of Present Illness       Reason for visit:  Sore throat cough fever sweats  Symptom onset:  3-7 days ago  Symptoms include:  Cough phelm  Symptom intensity:  Severe  Symptom progression:  Staying the same  Had these symptoms before:  Yes  Has tried/received treatment for these symptoms:  Yes  Previous treatment was successful:  Yes  Prior treatment description:  Amoxicillin  What makes it worse:  Night time laying down  What makes it better:  No   She is taking medications regularly.      Every spring " "she gets trouble with sinus/bronchitis.        Review of Systems  Constitutional, HEENT, cardiovascular, pulmonary, GI, , musculoskeletal, neuro, skin, endocrine and psych systems are negative, except as otherwise noted.      Objective    /80   Pulse 77   Temp 98.1  F (36.7  C) (Temporal)   Resp 24   Ht 1.6 m (5' 3\")   Wt 77.1 kg (170 lb)   SpO2 98%   BMI 30.11 kg/m    Body mass index is 30.11 kg/m .  Physical Exam   GENERAL: alert and no distress  NECK: no adenopathy, no asymmetry, masses, or scars  RESP: lungs clear to auscultation - no rales, rhonchi or wheezes, slightly bronchospastic cough noted.  CV: regular rate and rhythm, normal S1 S2, no S3 or S4, no murmur, click or rub, no peripheral edema  ABDOMEN: soft, nontender, no hepatosplenomegaly, no masses and bowel sounds normal  MS: no gross musculoskeletal defects noted, no edema    X-ray: negative for concerning pathology to my independent review today.  Question of pulmonary nodule on the right but this could also be the endon projection of bronchi in the region in the perihilar area.  It will be overread by radiology.    Results for orders placed or performed in visit on 04/10/25 (from the past 24 hours)   Streptococcus A Rapid Screen w/Reflex to PCR - Clinic Collect    Specimen: Throat; Swab   Result Value Ref Range    Group A Strep antigen Negative Negative   CBC with platelets and differential    Narrative    The following orders were created for panel order CBC with platelets and differential.  Procedure                               Abnormality         Status                     ---------                               -----------         ------                     CBC with platelets and ...[[id number]]                      Final result                 Please view results for these tests on the individual orders.   CBC with platelets and differential   Result Value Ref Range    WBC Count 8.4 4.0 - 11.0 10e3/uL    RBC Count 4.77 3.80 - " 5.20 10e6/uL    Hemoglobin 13.9 11.7 - 15.7 g/dL    Hematocrit 42.2 35.0 - 47.0 %    MCV 89 78 - 100 fL    MCH 29.1 26.5 - 33.0 pg    MCHC 32.9 31.5 - 36.5 g/dL    RDW 12.4 10.0 - 15.0 %    Platelet Count 272 150 - 450 10e3/uL    % Neutrophils 52 %    % Lymphocytes 36 %    % Monocytes 8 %    % Eosinophils 3 %    % Basophils 1 %    % Immature Granulocytes 0 %    Absolute Neutrophils 4.3 1.6 - 8.3 10e3/uL    Absolute Lymphocytes 3.0 0.8 - 5.3 10e3/uL    Absolute Monocytes 0.7 0.0 - 1.3 10e3/uL    Absolute Eosinophils 0.3 0.0 - 0.7 10e3/uL    Absolute Basophils 0.1 0.0 - 0.2 10e3/uL    Absolute Immature Granulocytes 0.0 <=0.4 10e3/uL           Signed Electronically by: Preston Oliva PA-C

## 2025-07-07 ASSESSMENT — SLEEP AND FATIGUE QUESTIONNAIRES
HOW LIKELY ARE YOU TO NOD OFF OR FALL ASLEEP WHILE SITTING AND READING: SLIGHT CHANCE OF DOZING
HOW LIKELY ARE YOU TO NOD OFF OR FALL ASLEEP WHILE SITTING QUIETLY AFTER LUNCH WITHOUT ALCOHOL: SLIGHT CHANCE OF DOZING
HOW LIKELY ARE YOU TO NOD OFF OR FALL ASLEEP WHILE SITTING INACTIVE IN A PUBLIC PLACE: SLIGHT CHANCE OF DOZING
HOW LIKELY ARE YOU TO NOD OFF OR FALL ASLEEP WHEN YOU ARE A PASSENGER IN A CAR FOR AN HOUR WITHOUT A BREAK: SLIGHT CHANCE OF DOZING
HOW LIKELY ARE YOU TO NOD OFF OR FALL ASLEEP IN A CAR, WHILE STOPPED FOR A FEW MINUTES IN TRAFFIC: WOULD NEVER DOZE
HOW LIKELY ARE YOU TO NOD OFF OR FALL ASLEEP WHILE LYING DOWN TO REST IN THE AFTERNOON WHEN CIRCUMSTANCES PERMIT: HIGH CHANCE OF DOZING
HOW LIKELY ARE YOU TO NOD OFF OR FALL ASLEEP WHILE SITTING AND TALKING TO SOMEONE: WOULD NEVER DOZE
HOW LIKELY ARE YOU TO NOD OFF OR FALL ASLEEP WHILE WATCHING TV: SLIGHT CHANCE OF DOZING

## 2025-07-08 ENCOUNTER — OFFICE VISIT (OUTPATIENT)
Dept: SLEEP MEDICINE | Facility: CLINIC | Age: 65
End: 2025-07-08
Attending: FAMILY MEDICINE
Payer: COMMERCIAL

## 2025-07-08 VITALS
RESPIRATION RATE: 14 BRPM | BODY MASS INDEX: 29.88 KG/M2 | HEART RATE: 70 BPM | WEIGHT: 168.6 LBS | DIASTOLIC BLOOD PRESSURE: 87 MMHG | HEIGHT: 63 IN | SYSTOLIC BLOOD PRESSURE: 129 MMHG | OXYGEN SATURATION: 97 %

## 2025-07-08 DIAGNOSIS — G47.52 DREAM ENACTMENT BEHAVIOR: ICD-10-CM

## 2025-07-08 DIAGNOSIS — G47.33 OSA (OBSTRUCTIVE SLEEP APNEA): Primary | ICD-10-CM

## 2025-07-08 PROCEDURE — 99204 OFFICE O/P NEW MOD 45 MIN: CPT

## 2025-07-08 RX ORDER — MULTIVIT WITH MINERALS/LUTEIN
400 TABLET ORAL DAILY
COMMUNITY

## 2025-07-08 RX ORDER — CHLORAL HYDRATE 500 MG
1 CAPSULE ORAL DAILY
COMMUNITY

## 2025-07-08 NOTE — PATIENT INSTRUCTIONS
You wear the Respironics Wisp nasal mask.     I placed the order for a CPAP machine. That routes electronically to [facility]. They will contact you in the next 1-2 weeks to schedule an appointment to get the device.     A few things to know about starting CPAP:  CPAP machines are often rent-to-own over 3-12 months depending on your insurance. During the first 3 months, insurances will often want to see at least 4 hours of use on 70% of nights over a 30 day period. Ideally, you would wear it whenever sleeping, but 4 hours is where health benefits really start.   If you don't like the first mask you get, you can exchange it once in the first month for free. Otherwise, insurance will cover new supplies about every 3 months. They will give you paperwork explaining how often to clean and replace parts when you get the machine.  We have people called our sleep therapy management team who will be checking in on you in the first month. They can access data from your machine and help troubleshoot any problems you may have.    Insurances sometimes require a follow up in the 2-3 month range. Please call [phone] to schedule.    LEN Iverson CNP

## 2025-07-08 NOTE — NURSING NOTE
"Chief Complaint   Patient presents with    CPAP Follow Up    Sleep Problem     Patient states she hasn't been seen in years. Also have some Cpap equipment questions as well as some nightmares lately.       Initial /87   Pulse 70   Resp 14   Ht 1.6 m (5' 3\")   Wt 76.5 kg (168 lb 9.6 oz)   SpO2 97%   BMI 29.87 kg/m   Estimated body mass index is 29.87 kg/m  as calculated from the following:    Height as of this encounter: 1.6 m (5' 3\").    Weight as of this encounter: 76.5 kg (168 lb 9.6 oz).    Medication Reconciliation: complete    Neck circumference: 14 inches / 35 centimeters.    DME: [employer]    ESS: 8  CLARK: 8    Aime Orantes MA on 7/8/2025 at 11:04 AM      "
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
## 2025-07-16 ENCOUNTER — DOCUMENTATION ONLY (OUTPATIENT)
Dept: SLEEP MEDICINE | Facility: CLINIC | Age: 65
End: 2025-07-16
Payer: COMMERCIAL

## 2025-07-16 DIAGNOSIS — G47.33 OSA (OBSTRUCTIVE SLEEP APNEA): Primary | ICD-10-CM

## 2025-07-16 NOTE — PROGRESS NOTES
Patient was offered choice of vendor and chose [facility].  Patient Sun De La Garza was set up at [place] on July 16, 2025. Patient received a Resmed Airsense 10 Pressures were set at 7-10 cm H2O.   Patient s ramp is 5 cm H2O for Auto and FLEX/EPR is EPR.  Patient received a [employer] Respironics Mask name: WISP  Nasal mask size Small, heated tubing and heated humidifier.  Patient has the following compliance requirements: usage only  Patient has a follow up on TBD with DIDI Brannon

## 2025-08-14 SDOH — HEALTH STABILITY: PHYSICAL HEALTH: ON AVERAGE, HOW MANY MINUTES DO YOU ENGAGE IN EXERCISE AT THIS LEVEL?: 50 MIN

## 2025-08-14 SDOH — HEALTH STABILITY: PHYSICAL HEALTH: ON AVERAGE, HOW MANY DAYS PER WEEK DO YOU ENGAGE IN MODERATE TO STRENUOUS EXERCISE (LIKE A BRISK WALK)?: 5 DAYS

## 2025-08-14 ASSESSMENT — SOCIAL DETERMINANTS OF HEALTH (SDOH): HOW OFTEN DO YOU GET TOGETHER WITH FRIENDS OR RELATIVES?: PATIENT DECLINED

## 2025-08-19 ENCOUNTER — OFFICE VISIT (OUTPATIENT)
Dept: FAMILY MEDICINE | Facility: OTHER | Age: 65
End: 2025-08-19
Attending: FAMILY MEDICINE
Payer: COMMERCIAL

## 2025-08-19 VITALS
DIASTOLIC BLOOD PRESSURE: 80 MMHG | TEMPERATURE: 97.4 F | SYSTOLIC BLOOD PRESSURE: 120 MMHG | BODY MASS INDEX: 30.12 KG/M2 | WEIGHT: 170 LBS | HEIGHT: 63 IN | OXYGEN SATURATION: 95 % | HEART RATE: 71 BPM

## 2025-08-19 DIAGNOSIS — Z00.00 ROUTINE GENERAL MEDICAL EXAMINATION AT A HEALTH CARE FACILITY: Primary | ICD-10-CM

## 2025-08-19 DIAGNOSIS — E66.09 CLASS 1 OBESITY DUE TO EXCESS CALORIES WITH SERIOUS COMORBIDITY AND BODY MASS INDEX (BMI) OF 30.0 TO 30.9 IN ADULT: ICD-10-CM

## 2025-08-19 DIAGNOSIS — F41.8 DEPRESSION WITH ANXIETY: ICD-10-CM

## 2025-08-19 DIAGNOSIS — M79.602 PAIN OF LEFT UPPER EXTREMITY: ICD-10-CM

## 2025-08-19 DIAGNOSIS — E66.811 CLASS 1 OBESITY DUE TO EXCESS CALORIES WITH SERIOUS COMORBIDITY AND BODY MASS INDEX (BMI) OF 30.0 TO 30.9 IN ADULT: ICD-10-CM

## 2025-08-19 LAB
ALBUMIN SERPL BCG-MCNC: 4.6 G/DL (ref 3.5–5.2)
ALP SERPL-CCNC: 91 U/L (ref 40–150)
ALT SERPL W P-5'-P-CCNC: 15 U/L (ref 0–50)
ANION GAP SERPL CALCULATED.3IONS-SCNC: 13 MMOL/L (ref 7–15)
AST SERPL W P-5'-P-CCNC: 19 U/L (ref 0–45)
BILIRUB SERPL-MCNC: 0.3 MG/DL
BUN SERPL-MCNC: 16.7 MG/DL (ref 8–23)
CALCIUM SERPL-MCNC: 9.9 MG/DL (ref 8.8–10.4)
CHLORIDE SERPL-SCNC: 105 MMOL/L (ref 98–107)
CREAT SERPL-MCNC: 0.86 MG/DL (ref 0.51–0.95)
EGFRCR SERPLBLD CKD-EPI 2021: 75 ML/MIN/1.73M2
EST. AVERAGE GLUCOSE BLD GHB EST-MCNC: 114 MG/DL
GLUCOSE SERPL-MCNC: 104 MG/DL (ref 70–99)
HBA1C MFR BLD: 5.6 % (ref 0–5.6)
HCO3 SERPL-SCNC: 25 MMOL/L (ref 22–29)
POTASSIUM SERPL-SCNC: 4.3 MMOL/L (ref 3.4–5.3)
PROT SERPL-MCNC: 7.9 G/DL (ref 6.4–8.3)
SODIUM SERPL-SCNC: 143 MMOL/L (ref 135–145)
TSH SERPL DL<=0.005 MIU/L-ACNC: 1.76 UIU/ML (ref 0.3–4.2)

## 2025-08-19 PROCEDURE — 84443 ASSAY THYROID STIM HORMONE: CPT | Performed by: FAMILY MEDICINE

## 2025-08-19 PROCEDURE — 99214 OFFICE O/P EST MOD 30 MIN: CPT | Mod: 25 | Performed by: FAMILY MEDICINE

## 2025-08-19 PROCEDURE — 83036 HEMOGLOBIN GLYCOSYLATED A1C: CPT | Performed by: FAMILY MEDICINE

## 2025-08-19 PROCEDURE — 80053 COMPREHEN METABOLIC PANEL: CPT | Performed by: FAMILY MEDICINE

## 2025-08-19 PROCEDURE — 36415 COLL VENOUS BLD VENIPUNCTURE: CPT | Performed by: FAMILY MEDICINE

## 2025-08-19 PROCEDURE — 99396 PREV VISIT EST AGE 40-64: CPT | Performed by: FAMILY MEDICINE

## 2025-08-19 RX ORDER — NALTREXONE HYDROCHLORIDE 50 MG/1
TABLET, FILM COATED ORAL
Qty: 26 TABLET | Refills: 0 | Status: SHIPPED | OUTPATIENT
Start: 2025-08-19 | End: 2025-09-18

## 2025-08-19 RX ORDER — NALTREXONE HYDROCHLORIDE 50 MG/1
50 TABLET, FILM COATED ORAL DAILY
Qty: 30 TABLET | Refills: 2 | Status: SHIPPED | OUTPATIENT
Start: 2025-08-19

## 2025-08-20 ENCOUNTER — MYC REFILL (OUTPATIENT)
Dept: FAMILY MEDICINE | Facility: OTHER | Age: 65
End: 2025-08-20
Payer: COMMERCIAL

## 2025-08-20 DIAGNOSIS — F41.8 DEPRESSION WITH ANXIETY: ICD-10-CM

## 2025-08-20 DIAGNOSIS — K21.00 GASTROESOPHAGEAL REFLUX DISEASE WITH ESOPHAGITIS WITHOUT HEMORRHAGE: ICD-10-CM

## 2025-08-21 RX ORDER — OMEPRAZOLE 40 MG/1
CAPSULE, DELAYED RELEASE ORAL
Qty: 90 CAPSULE | Refills: 3 | OUTPATIENT
Start: 2025-08-21

## 2025-08-21 RX ORDER — BUPROPION HYDROCHLORIDE 300 MG/1
TABLET ORAL
Qty: 90 TABLET | Refills: 3 | OUTPATIENT
Start: 2025-08-21

## (undated) DEVICE — PAD CHUX UNDERPAD 23X24" 7136

## (undated) DEVICE — KIT ENDO FIRST STEP DISINFECTANT 200ML W/POUCH EP-4

## (undated) RX ORDER — ETHYL CHLORIDE 100 %
AEROSOL, SPRAY (ML) TOPICAL
Status: DISPENSED
Start: 2022-07-18

## (undated) RX ORDER — SIMETHICONE 40MG/0.6ML
SUSPENSION, DROPS(FINAL DOSAGE FORM)(ML) ORAL
Status: DISPENSED
Start: 2022-07-18